# Patient Record
Sex: FEMALE | Race: WHITE | Employment: UNEMPLOYED | URBAN - METROPOLITAN AREA
[De-identification: names, ages, dates, MRNs, and addresses within clinical notes are randomized per-mention and may not be internally consistent; named-entity substitution may affect disease eponyms.]

---

## 2017-01-29 PROBLEM — I25.10 CAD (CORONARY ARTERY DISEASE): Status: ACTIVE | Noted: 2017-01-29

## 2017-01-29 PROBLEM — I48.91 NEW ONSET A-FIB (HCC): Status: ACTIVE | Noted: 2017-01-29

## 2017-01-29 PROBLEM — I10 HTN (HYPERTENSION): Status: ACTIVE | Noted: 2017-01-29

## 2017-01-29 PROBLEM — E78.5 HYPERLIPIDEMIA: Status: ACTIVE | Noted: 2017-01-29

## 2017-01-30 PROBLEM — I25.10 CAD (CORONARY ARTERY DISEASE): Status: RESOLVED | Noted: 2017-01-29 | Resolved: 2017-01-30

## 2019-12-22 ENCOUNTER — APPOINTMENT (OUTPATIENT)
Dept: GENERAL RADIOLOGY | Age: 69
End: 2019-12-22
Payer: MEDICARE

## 2019-12-22 ENCOUNTER — HOSPITAL ENCOUNTER (EMERGENCY)
Age: 69
Discharge: HOME | End: 2019-12-22
Payer: MEDICARE

## 2019-12-22 VITALS
TEMPERATURE: 97.6 F | BODY MASS INDEX: 44.56 KG/M2 | DIASTOLIC BLOOD PRESSURE: 109 MMHG | OXYGEN SATURATION: 100 % | HEIGHT: 64 IN | RESPIRATION RATE: 18 BRPM | WEIGHT: 261.02 LBS | HEART RATE: 55 BPM | SYSTOLIC BLOOD PRESSURE: 134 MMHG

## 2019-12-22 DIAGNOSIS — J20.9 ACUTE BRONCHITIS, UNSPECIFIED ORGANISM: Primary | ICD-10-CM

## 2019-12-22 LAB
ANION GAP SERPL CALCULATED.3IONS-SCNC: 13 MMOL/L (ref 3–16)
BACTERIA: ABNORMAL /HPF
BASOPHILS ABSOLUTE: 0 K/UL (ref 0–0.2)
BASOPHILS RELATIVE PERCENT: 0.5 %
BILIRUBIN URINE: ABNORMAL
BLOOD, URINE: NEGATIVE
BUN BLDV-MCNC: 16 MG/DL (ref 7–20)
CALCIUM SERPL-MCNC: 9.5 MG/DL (ref 8.3–10.6)
CHLORIDE BLD-SCNC: 104 MMOL/L (ref 99–110)
CLARITY: ABNORMAL
CO2: 24 MMOL/L (ref 21–32)
COLOR: ABNORMAL
CREAT SERPL-MCNC: 0.5 MG/DL (ref 0.6–1.2)
CRYSTALS, UA: ABNORMAL /HPF
EOSINOPHILS ABSOLUTE: 0.2 K/UL (ref 0–0.6)
EOSINOPHILS RELATIVE PERCENT: 2.1 %
EPITHELIAL CELLS, UA: 1 /HPF (ref 0–5)
GFR AFRICAN AMERICAN: >60
GFR NON-AFRICAN AMERICAN: >60
GLUCOSE BLD-MCNC: 89 MG/DL (ref 70–99)
GLUCOSE URINE: NEGATIVE MG/DL
HCT VFR BLD CALC: 42.5 % (ref 36–48)
HEMOGLOBIN: 13.9 G/DL (ref 12–16)
HYALINE CASTS: 1 /LPF (ref 0–8)
KETONES, URINE: NEGATIVE MG/DL
LEUKOCYTE ESTERASE, URINE: NEGATIVE
LYMPHOCYTES ABSOLUTE: 2.4 K/UL (ref 1–5.1)
LYMPHOCYTES RELATIVE PERCENT: 31.5 %
MCH RBC QN AUTO: 29.1 PG (ref 26–34)
MCHC RBC AUTO-ENTMCNC: 32.7 G/DL (ref 31–36)
MCV RBC AUTO: 89 FL (ref 80–100)
MICROSCOPIC EXAMINATION: YES
MONOCYTES ABSOLUTE: 0.9 K/UL (ref 0–1.3)
MONOCYTES RELATIVE PERCENT: 11.8 %
MUCUS: ABNORMAL /LPF
NEUTROPHILS ABSOLUTE: 4.2 K/UL (ref 1.7–7.7)
NEUTROPHILS RELATIVE PERCENT: 54.1 %
NITRITE, URINE: NEGATIVE
PDW BLD-RTO: 14.7 % (ref 12.4–15.4)
PH UA: 6 (ref 5–8)
PLATELET # BLD: 277 K/UL (ref 135–450)
PMV BLD AUTO: 8.1 FL (ref 5–10.5)
POTASSIUM SERPL-SCNC: 4.3 MMOL/L (ref 3.5–5.1)
PRO-BNP: 795 PG/ML (ref 0–124)
PROTEIN UA: NEGATIVE MG/DL
RBC # BLD: 4.78 M/UL (ref 4–5.2)
REASON FOR REJECTION: NORMAL
REJECTED TEST: NORMAL
SODIUM BLD-SCNC: 141 MMOL/L (ref 136–145)
SPECIFIC GRAVITY UA: 1.03 (ref 1–1.03)
TROPONIN: 0.01 NG/ML
TROPONIN: 0.01 NG/ML
URINE REFLEX TO CULTURE: ABNORMAL
URINE TYPE: ABNORMAL
UROBILINOGEN, URINE: 1 E.U./DL
WBC # BLD: 7.7 K/UL (ref 4–11)
WBC UA: 1 /HPF (ref 0–5)

## 2019-12-22 PROCEDURE — 81001 URINALYSIS AUTO W/SCOPE: CPT

## 2019-12-22 PROCEDURE — 94761 N-INVAS EAR/PLS OXIMETRY MLT: CPT

## 2019-12-22 PROCEDURE — 80048 BASIC METABOLIC PNL TOTAL CA: CPT

## 2019-12-22 PROCEDURE — 99284 EMERGENCY DEPT VISIT MOD MDM: CPT

## 2019-12-22 PROCEDURE — 83880 ASSAY OF NATRIURETIC PEPTIDE: CPT

## 2019-12-22 PROCEDURE — 6370000000 HC RX 637 (ALT 250 FOR IP): Performed by: PHYSICIAN ASSISTANT

## 2019-12-22 PROCEDURE — 87186 SC STD MICRODIL/AGAR DIL: CPT

## 2019-12-22 PROCEDURE — 85025 COMPLETE CBC W/AUTO DIFF WBC: CPT

## 2019-12-22 PROCEDURE — 94640 AIRWAY INHALATION TREATMENT: CPT

## 2019-12-22 PROCEDURE — 87077 CULTURE AEROBIC IDENTIFY: CPT

## 2019-12-22 PROCEDURE — 71046 X-RAY EXAM CHEST 2 VIEWS: CPT

## 2019-12-22 PROCEDURE — 87086 URINE CULTURE/COLONY COUNT: CPT

## 2019-12-22 PROCEDURE — 93005 ELECTROCARDIOGRAM TRACING: CPT | Performed by: PHYSICIAN ASSISTANT

## 2019-12-22 PROCEDURE — 84484 ASSAY OF TROPONIN QUANT: CPT

## 2019-12-22 RX ORDER — ONDANSETRON 4 MG/1
4 TABLET, ORALLY DISINTEGRATING ORAL EVERY 8 HOURS PRN
Qty: 10 TABLET | Refills: 0 | Status: SHIPPED | OUTPATIENT
Start: 2019-12-22 | End: 2019-12-22 | Stop reason: CLARIF

## 2019-12-22 RX ORDER — METHYLPREDNISOLONE 4 MG/1
4 TABLET ORAL SEE ADMIN INSTRUCTIONS
Qty: 1 KIT | Refills: 0 | Status: SHIPPED | OUTPATIENT
Start: 2019-12-22 | End: 2019-12-28

## 2019-12-22 RX ORDER — IPRATROPIUM BROMIDE AND ALBUTEROL SULFATE 2.5; .5 MG/3ML; MG/3ML
1 SOLUTION RESPIRATORY (INHALATION) ONCE
Status: COMPLETED | OUTPATIENT
Start: 2019-12-22 | End: 2019-12-22

## 2019-12-22 RX ORDER — OXYCODONE HYDROCHLORIDE AND ACETAMINOPHEN 5; 325 MG/1; MG/1
1 TABLET ORAL EVERY 6 HOURS PRN
Qty: 12 TABLET | Refills: 0 | Status: SHIPPED | OUTPATIENT
Start: 2019-12-22 | End: 2019-12-22 | Stop reason: CLARIF

## 2019-12-22 RX ORDER — OXYCODONE HYDROCHLORIDE 5 MG/1
5 TABLET ORAL EVERY 6 HOURS PRN
Qty: 12 TABLET | Refills: 0 | Status: SHIPPED | OUTPATIENT
Start: 2019-12-22 | End: 2019-12-22 | Stop reason: CLARIF

## 2019-12-22 RX ORDER — ALBUTEROL SULFATE 90 UG/1
2 AEROSOL, METERED RESPIRATORY (INHALATION) 4 TIMES DAILY
Qty: 1 INHALER | Refills: 0 | Status: ON HOLD | OUTPATIENT
Start: 2019-12-22 | End: 2022-07-23

## 2019-12-22 RX ADMIN — IPRATROPIUM BROMIDE AND ALBUTEROL SULFATE 1 AMPULE: .5; 3 SOLUTION RESPIRATORY (INHALATION) at 09:41

## 2019-12-22 ASSESSMENT — ENCOUNTER SYMPTOMS
SORE THROAT: 0
ABDOMINAL PAIN: 0
BACK PAIN: 0
COUGH: 1
SHORTNESS OF BREATH: 1
NAUSEA: 0
VOMITING: 0

## 2019-12-23 LAB
EKG ATRIAL RATE: 147 BPM
EKG DIAGNOSIS: NORMAL
EKG Q-T INTERVAL: 352 MS
EKG QRS DURATION: 126 MS
EKG QTC CALCULATION (BAZETT): 469 MS
EKG R AXIS: -31 DEGREES
EKG T AXIS: 72 DEGREES
EKG VENTRICULAR RATE: 107 BPM

## 2019-12-23 PROCEDURE — 93010 ELECTROCARDIOGRAM REPORT: CPT | Performed by: INTERNAL MEDICINE

## 2019-12-24 LAB
ORGANISM: ABNORMAL
URINE CULTURE, ROUTINE: ABNORMAL

## 2022-06-15 ENCOUNTER — APPOINTMENT (OUTPATIENT)
Dept: CT IMAGING | Age: 72
End: 2022-06-15
Payer: MEDICARE

## 2022-06-15 ENCOUNTER — HOSPITAL ENCOUNTER (EMERGENCY)
Age: 72
Discharge: HOME | End: 2022-06-15
Attending: EMERGENCY MEDICINE
Payer: MEDICARE

## 2022-06-15 VITALS
OXYGEN SATURATION: 96 % | HEART RATE: 80 BPM | TEMPERATURE: 98.9 F | WEIGHT: 292.99 LBS | HEIGHT: 67 IN | RESPIRATION RATE: 20 BRPM | BODY MASS INDEX: 45.99 KG/M2 | SYSTOLIC BLOOD PRESSURE: 133 MMHG | DIASTOLIC BLOOD PRESSURE: 91 MMHG

## 2022-06-15 DIAGNOSIS — U07.1 ACUTE COVID-19: Primary | ICD-10-CM

## 2022-06-15 DIAGNOSIS — R11.2 NAUSEA VOMITING AND DIARRHEA: ICD-10-CM

## 2022-06-15 DIAGNOSIS — R19.7 NAUSEA VOMITING AND DIARRHEA: ICD-10-CM

## 2022-06-15 LAB
A/G RATIO: 1.1 (ref 1.1–2.2)
ALBUMIN SERPL-MCNC: 3.8 G/DL (ref 3.4–5)
ALP BLD-CCNC: 115 U/L (ref 40–129)
ALT SERPL-CCNC: 15 U/L (ref 10–40)
ANION GAP SERPL CALCULATED.3IONS-SCNC: 13 MMOL/L (ref 3–16)
AST SERPL-CCNC: 28 U/L (ref 15–37)
ATYPICAL LYMPHOCYTE RELATIVE PERCENT: 4 % (ref 0–6)
BACTERIA: ABNORMAL /HPF
BASOPHILS ABSOLUTE: 0 K/UL (ref 0–0.2)
BASOPHILS RELATIVE PERCENT: 0 %
BILIRUB SERPL-MCNC: 1.6 MG/DL (ref 0–1)
BILIRUBIN URINE: NEGATIVE
BLOOD, URINE: ABNORMAL
BUN BLDV-MCNC: 14 MG/DL (ref 7–20)
CALCIUM SERPL-MCNC: 9.1 MG/DL (ref 8.3–10.6)
CHLORIDE BLD-SCNC: 98 MMOL/L (ref 99–110)
CLARITY: ABNORMAL
CO2: 22 MMOL/L (ref 21–32)
COLOR: YELLOW
CREAT SERPL-MCNC: 0.7 MG/DL (ref 0.6–1.2)
EOSINOPHILS ABSOLUTE: 0 K/UL (ref 0–0.6)
EOSINOPHILS RELATIVE PERCENT: 0 %
EPITHELIAL CELLS, UA: ABNORMAL /HPF (ref 0–5)
GFR AFRICAN AMERICAN: >60
GFR NON-AFRICAN AMERICAN: >60
GLUCOSE BLD-MCNC: 105 MG/DL (ref 70–99)
GLUCOSE URINE: NEGATIVE MG/DL
HCT VFR BLD CALC: 38.2 % (ref 36–48)
HEMOGLOBIN: 13 G/DL (ref 12–16)
KETONES, URINE: NEGATIVE MG/DL
LEUKOCYTE ESTERASE, URINE: ABNORMAL
LIPASE: 8 U/L (ref 13–60)
LYMPHOCYTES ABSOLUTE: 1.3 K/UL (ref 1–5.1)
LYMPHOCYTES RELATIVE PERCENT: 15 %
MCH RBC QN AUTO: 29.2 PG (ref 26–34)
MCHC RBC AUTO-ENTMCNC: 33.9 G/DL (ref 31–36)
MCV RBC AUTO: 86.1 FL (ref 80–100)
MICROSCOPIC EXAMINATION: YES
MONOCYTES ABSOLUTE: 0.7 K/UL (ref 0–1.3)
MONOCYTES RELATIVE PERCENT: 10 %
NEUTROPHILS ABSOLUTE: 4.7 K/UL (ref 1.7–7.7)
NEUTROPHILS RELATIVE PERCENT: 71 %
NITRITE, URINE: NEGATIVE
PDW BLD-RTO: 15.4 % (ref 12.4–15.4)
PH UA: 7 (ref 5–8)
PLATELET # BLD: 174 K/UL (ref 135–450)
PLATELET SLIDE REVIEW: ADEQUATE
PMV BLD AUTO: 8.7 FL (ref 5–10.5)
POTASSIUM REFLEX MAGNESIUM: 4.4 MMOL/L (ref 3.5–5.1)
PROTEIN UA: NEGATIVE MG/DL
RAPID INFLUENZA  B AGN: NEGATIVE
RAPID INFLUENZA A AGN: NEGATIVE
RBC # BLD: 4.44 M/UL (ref 4–5.2)
RBC UA: ABNORMAL /HPF (ref 0–4)
SARS-COV-2, NAAT: DETECTED
SLIDE REVIEW: NORMAL
SODIUM BLD-SCNC: 133 MMOL/L (ref 136–145)
SPECIFIC GRAVITY UA: 1.01 (ref 1–1.03)
TOTAL PROTEIN: 7.2 G/DL (ref 6.4–8.2)
URINE REFLEX TO CULTURE: ABNORMAL
URINE TYPE: ABNORMAL
UROBILINOGEN, URINE: 0.2 E.U./DL
WBC # BLD: 6.6 K/UL (ref 4–11)
WBC UA: ABNORMAL /HPF (ref 0–5)

## 2022-06-15 PROCEDURE — 87804 INFLUENZA ASSAY W/OPTIC: CPT

## 2022-06-15 PROCEDURE — 87635 SARS-COV-2 COVID-19 AMP PRB: CPT

## 2022-06-15 PROCEDURE — 80053 COMPREHEN METABOLIC PANEL: CPT

## 2022-06-15 PROCEDURE — 81001 URINALYSIS AUTO W/SCOPE: CPT

## 2022-06-15 PROCEDURE — 85025 COMPLETE CBC W/AUTO DIFF WBC: CPT

## 2022-06-15 PROCEDURE — 36415 COLL VENOUS BLD VENIPUNCTURE: CPT

## 2022-06-15 PROCEDURE — 2580000003 HC RX 258: Performed by: EMERGENCY MEDICINE

## 2022-06-15 PROCEDURE — 6360000002 HC RX W HCPCS: Performed by: EMERGENCY MEDICINE

## 2022-06-15 PROCEDURE — 83690 ASSAY OF LIPASE: CPT

## 2022-06-15 PROCEDURE — 74177 CT ABD & PELVIS W/CONTRAST: CPT

## 2022-06-15 PROCEDURE — 6360000004 HC RX CONTRAST MEDICATION: Performed by: EMERGENCY MEDICINE

## 2022-06-15 PROCEDURE — 99285 EMERGENCY DEPT VISIT HI MDM: CPT

## 2022-06-15 PROCEDURE — 96374 THER/PROPH/DIAG INJ IV PUSH: CPT

## 2022-06-15 RX ORDER — ONDANSETRON 2 MG/ML
4 INJECTION INTRAMUSCULAR; INTRAVENOUS ONCE
Status: COMPLETED | OUTPATIENT
Start: 2022-06-15 | End: 2022-06-15

## 2022-06-15 RX ORDER — 0.9 % SODIUM CHLORIDE 0.9 %
1000 INTRAVENOUS SOLUTION INTRAVENOUS ONCE
Status: COMPLETED | OUTPATIENT
Start: 2022-06-15 | End: 2022-06-15

## 2022-06-15 RX ORDER — ONDANSETRON 4 MG/1
4 TABLET, ORALLY DISINTEGRATING ORAL EVERY 8 HOURS PRN
Qty: 20 TABLET | Refills: 0 | Status: SHIPPED | OUTPATIENT
Start: 2022-06-15

## 2022-06-15 RX ADMIN — ONDANSETRON 4 MG: 2 INJECTION INTRAMUSCULAR; INTRAVENOUS at 13:58

## 2022-06-15 RX ADMIN — IOPAMIDOL 100 ML: 755 INJECTION, SOLUTION INTRAVENOUS at 14:52

## 2022-06-15 RX ADMIN — SODIUM CHLORIDE 1000 ML: 9 INJECTION, SOLUTION INTRAVENOUS at 13:58

## 2022-06-15 ASSESSMENT — PAIN DESCRIPTION - DESCRIPTORS: DESCRIPTORS: SHARP

## 2022-06-15 ASSESSMENT — PAIN - FUNCTIONAL ASSESSMENT
PAIN_FUNCTIONAL_ASSESSMENT: 0-10
PAIN_FUNCTIONAL_ASSESSMENT: NONE - DENIES PAIN

## 2022-06-15 ASSESSMENT — PAIN DESCRIPTION - LOCATION: LOCATION: ABDOMEN

## 2022-06-15 ASSESSMENT — PAIN SCALES - GENERAL: PAINLEVEL_OUTOF10: 10

## 2022-06-15 NOTE — ED NOTES
D/C: Order noted for d/c. Pt confirmed d/c paperwork and one prescription has correct name. Discharge and education instructions reviewed with patient. Teach-back successful. Pt verbalized understanding and signed d/c papers. Pt denied questions at this time. No acute distress noted. Patient instructed to follow-up as noted - return to emergency department if symptoms worsen. Patient verbalized understanding. Discharged per EDMD with discharge instructions. Pt discharged to private vehicle. Patient stable upon departure. Thanked patient for choosing [facility] for care. Provider aware of patient pain at time of discharge.        Everitt Angelucci, RN  06/15/22 5377

## 2022-06-15 NOTE — ED PROVIDER NOTES
[id number] [facility]    CHIEF COMPLAINT  Emesis (since friday, states that water has been staying down), Diarrhea, Other (states that she had blood on her pad the other day, but unsure if it was from her urethra or vagina), and Hypertension (did not take her BP meds for the last three days)       HISTORY OF PRESENT ILLNESS  Carole Heck is a 73 y.o. female who presents to the ED with complaint of abdominal pain, nausea, vomiting, diarrhea. Symptoms started late last week. Subjective fever but hasn't measured a temperature. Denies chest pain but complains of SOB. Saw some blood on the toilet paper, isn't sure if it's from her urine or otherwise. Denies dysuria, vaginal discharge. Has felt so unwell that she hasn't been able to take her medications, including her medications for several days. These include antihypertensives, as well as Xarelto for history of a-fib. Denies new visual disturbance or headache. Diffuse mild abdominal pain, poorly characterized. No other complaints, modifying factors or associated symptoms.      I have reviewed the following from the nursing documentation:    Past Medical History:   Diagnosis Date    Arthritis     Atrial fibrillation ([facility] [address]     Depression     Hyperlipidemia     Hypertension      Past Surgical History:   Procedure Laterality Date    CARDIAC CATHETERIZATION       SECTION      HERNIA REPAIR       Family History   Problem Relation Age of Onset    Diabetes Mother     High Cholesterol Father     High Blood Pressure Father     Diabetes Maternal Uncle     Diabetes Maternal Grandmother      Social History     Socioeconomic History    Marital status: Single     Spouse name: Not on file    Number of children: Not on file    Years of education: Not on file    Highest education level: Not on file   Occupational History    Not on file   Tobacco Use    Smoking status: Never Smoker    Smokeless tobacco: Never Used   Substance and Sexual Activity    Alcohol use: No    Drug use: No    Sexual activity: Not on file   Other Topics Concern    Not on file   Social History Narrative    Not on file     Social Determinants of Health     Financial Resource Strain:     Difficulty of Paying Living Expenses: Not on file   Food Insecurity:     Worried About Running Out of Food in the Last Year: Not on file    Stephen of Food in the Last Year: Not on file   Transportation Needs:     Lack of Transportation (Medical): Not on file    Lack of Transportation (Non-Medical): Not on file   Physical Activity:     Days of Exercise per Week: Not on file    Minutes of Exercise per Session: Not on file   Stress:     Feeling of Stress : Not on file   Social Connections:     Frequency of Communication with Friends and Family: Not on file    Frequency of Social Gatherings with Friends and Family: Not on file    Attends Jainism Services: Not on file    Active Member of [address] [place] [address] or Organizations: Not on file    Attends Club or Organization Meetings: Not on file    Marital Status: Not on file   Intimate Partner Violence:     Fear of Current or Ex-Partner: Not on file    Emotionally Abused: Not on file    Physically Abused: Not on file    Sexually Abused: Not on file   Housing Stability:     Unable to Pay for Housing in the Last Year: Not on file    Number of [place] in the Last Year: Not on file    Unstable Housing in the Last Year: Not on file     No current facility-administered medications for this encounter.      Current Outpatient Medications   Medication Sig Dispense Refill    ondansetron (ZOFRAN ODT) 4 MG disintegrating tablet Take 1 tablet by mouth every 8 hours as needed for Nausea 20 tablet 0    albuterol sulfate HFA (VENTOLIN HFA) 108 (90 Base) MCG/ACT inhaler Inhale 2 puffs into the lungs 4 times daily for 5 days 1 Inhaler 0    rivaroxaban (XARELTO) 20 MG TABS tablet Take 1 tablet by mouth daily 30 tablet 3    metoprolol succinate (TOPROL XL) 50 MG extended release tablet Take 1 tablet by mouth daily 30 tablet 3    furosemide (LASIX) 20 MG tablet Take 1 tablet by mouth daily 60 tablet 3    HYDROcodone-acetaminophen (NORCO) 7.5-325 MG per tablet One pill every six hours as needed      simvastatin (ZOCOR) 20 MG tablet ONE PILL AT BEDTIME      lisinopril (PRINIVIL;ZESTRIL) 5 MG tablet One pill every day      tiZANidine (ZANAFLEX) 4 MG tablet One pill every eight hours as needed       Allergies   Allergen Reactions    Pcn [Penicillins]        REVIEW OF SYSTEMS  10 systems reviewed, pertinent positives and negatives per HPI, otherwise noted to be negative. PHYSICAL EXAM  ED Triage Vitals [06/15/22 1301]   BP Temp Temp Source Heart Rate Resp SpO2 Height Weight   (!) 219/198 98.9 °F (37.2 °C) Oral (!) 120 20 95 % 5' 7\" (1.702 m) 292 lb 15.9 oz (132.9 kg)     General appearance: Awake and alert. Cooperative. No acute distress. HENT: Normocephalic. Atraumatic. Mucous membranes are moist.  Neck: Supple. Eyes: PERRL. EOMI. Heart/Chest: RRR. No murmurs. Lungs: Respirations unlabored. CTAB. Good air exchange. Speaking comfortably in full sentences. Abdomen: Soft. Diffuse mild abdominal pain. Non-distended. No rebound or guarding. Musculoskeletal: No extremity edema. No deformity. No tenderness in the extremities. All extremities neurovascularly intact. Skin: Warm and dry. No acute rashes. Neurological: Alert and oriented. CN II-XII intact. Strength 5/5 bilateral upper and lower extremities. Sensation intact to light touch. Gait normal.  Psychiatric: Mood/affect: normal      LABS  I have reviewed all labs for this visit.    Results for orders placed or performed during the hospital encounter of 06/15/22   COVID-19, Rapid    Specimen: Nasopharyngeal Swab   Result Value Ref Range    SARS-CoV-2, NAAT DETECTED (AA) Not Detected   Rapid influenza A/B antigens    Specimen: Nasopharyngeal   Result Value Ref Range    Rapid Influenza A Ag Negative Negative    Rapid Influenza B Ag Negative Negative   CBC with Auto Differential   Result Value Ref Range    WBC 6.6 4.0 - 11.0 K/uL    RBC 4.44 4.00 - 5.20 M/uL    Hemoglobin 13.0 12.0 - 16.0 g/dL    Hematocrit 38.2 36.0 - 48.0 %    MCV 86.1 80.0 - 100.0 fL    MCH 29.2 26.0 - 34.0 pg    MCHC 33.9 31.0 - 36.0 g/dL    RDW 15.4 12.4 - 15.4 %    Platelets 265 150 - 918 K/uL    MPV 8.7 5.0 - 10.5 fL    PLATELET SLIDE REVIEW Adequate     SLIDE REVIEW see below     Neutrophils % 71.0 %    Lymphocytes % 15.0 %    Monocytes % 10.0 %    Eosinophils % 0.0 %    Basophils % 0.0 %    Neutrophils Absolute 4.7 1.7 - 7.7 K/uL    Lymphocytes Absolute 1.3 1.0 - 5.1 K/uL    Monocytes Absolute 0.7 0.0 - 1.3 K/uL    Eosinophils Absolute 0.0 0.0 - 0.6 K/uL    Basophils Absolute 0.0 0.0 - 0.2 K/uL    Atypical Lymphocytes Relative 4 0 - 6 %   Comprehensive Metabolic Panel w/ Reflex to MG   Result Value Ref Range    Sodium 133 (L) 136 - 145 mmol/L    Potassium reflex Magnesium 4.4 3.5 - 5.1 mmol/L    Chloride 98 (L) 99 - 110 mmol/L    CO2 22 21 - 32 mmol/L    Anion Gap 13 3 - 16    Glucose 105 (H) 70 - 99 mg/dL    BUN 14 7 - 20 mg/dL    CREATININE 0.7 0.6 - 1.2 mg/dL    GFR Non-African American >60 >60    GFR African American >60 >60    Calcium 9.1 8.3 - 10.6 mg/dL    Total Protein 7.2 6.4 - 8.2 g/dL    Albumin 3.8 3.4 - 5.0 g/dL    Albumin/Globulin Ratio 1.1 1.1 - 2.2    Total Bilirubin 1.6 (H) 0.0 - 1.0 mg/dL    Alkaline Phosphatase 115 40 - 129 U/L    ALT 15 10 - 40 U/L    AST 28 15 - 37 U/L   Urinalysis with Reflex to Culture    Specimen: Urine, clean catch   Result Value Ref Range    Color, UA Yellow Straw/Yellow    Clarity, UA CLOUDY (A) Clear    Glucose, Ur Negative Negative mg/dL    Bilirubin Urine Negative Negative    Ketones, Urine Negative Negative mg/dL    Specific Gravity, UA 1.015 1.005 - 1.030    Blood, Urine LARGE (A) Negative    pH, UA 7.0 5.0 - 8.0    Protein, UA Negative Negative mg/dL    Urobilinogen, Urine 0.2 <2.0 E.U./dL    Nitrite, Urine Negative Negative    Leukocyte Esterase, Urine TRACE (A) Negative    Microscopic Examination YES     Urine Type Voided     Urine Reflex to Culture Not Indicated    Lipase   Result Value Ref Range    Lipase 8.0 (L) 13.0 - 60.0 U/L   Microscopic Urinalysis   Result Value Ref Range    WBC, UA 3-5 0 - 5 /HPF    RBC, UA None seen 0 - 4 /HPF    Epithelial Cells, UA 11-20 (A) 0 - 5 /HPF    Bacteria, UA 1+ (A) None Seen /HPF       RADIOLOGY  I have reviewed all radiographic studies for this visit. CT ABDOMEN PELVIS W IV CONTRAST Additional Contrast? None   Preliminary Result   1. No acute findings within the abdomen or pelvis. No acute bowel pathology. 2. Cholelithiasis. 3. Mild hepatic steatosis. 4. Stable 1.6 cm right adrenal nodule, likely an adenoma. ED COURSE/MDM  Patient seen and evaluated. Old records reviewed. Labs and imaging reviewed and results discussed with patient/family to extent possible. This is a 72-year-old female who presents with complaint of nausea, vomiting, diarrhea and abdominal pain. On arrival the patient is hypertensive in the setting of not being able to tolerate her antihypertensives and given her discomfort. Abdomen is with diffuse mild tenderness without rebound or guarding. CBC no leukocytosis or anemia. Renal panel mild hyponatremia and hypochloremia, preserved renal function. LFTs are generally unremarkable. Lipase not elevated. CT abdomen and pelvis nothing acute. Cholelithiasis but no cholecystitis. Stable 1.6 cm right adrenal nodule favored adenoma. Urinalysis generally unremarkable. Rapid COVID positive. This is likely the etiology of her symptoms. Patient was administered 1 L IV normal saline bolus and 4 mg of IV Zofran with improvement in her symptoms. Serial abdominal exams are reassuring. She reports feeling much better. Her blood pressure has improved without the administration of antihypertensives.   She is tolerating p.o. We will discharged home with instructions to quarantine per CDC guidelines. Will prescribe Zofran. Usual strict return precautions for new or worsening symptoms communicated    Is this patient to be included in the SEP-1 Core Measure? No   Exclusion criteria - the patient is NOT to be included for SEP-1 Core Measure due to:  Viral etiology found or highly suspected (including COVID-19) without concomitant bacterial infection    IVivek MD, am the primary clinician of record. During the patient's ED course, the patient was given:  Medications   0.9 % sodium chloride bolus (0 mLs IntraVENous Stopped 6/15/22 1547)   ondansetron (ZOFRAN) injection 4 mg (4 mg IntraVENous Given 6/15/22 1358)   iopamidol (ISOVUE-370) 76 % injection 100 mL (100 mLs IntraVENous Given 6/15/22 1452)        All questions were answered and the patient/family expressed understanding and agreement with the plan. PROCEDURES  None    CRITICAL CARE  N/A    CLINICAL IMPRESSION  1. Acute COVID-19    2. Nausea vomiting and diarrhea        DISPOSITION   discharge     Vivek Howell MD    Note: This chart was created using voice recognition dictation software. Efforts were made by me to ensure accuracy, however some errors may be present due to limitations of this technology and occasionally words are not transcribed correctly.         Vivek Howell MD  06/15/22 3173

## 2022-06-15 NOTE — ED TRIAGE NOTES
Patient presents to ED via private car with complaints of abdominal pain, vomiting, and diarrhea. Patient states that this has been going on since Friday. She has been able to drink some water. She also states that she noticed some blood on her pad in her underwear the other day, but she is unsure if it came from her vagina or urethra. Denies blood in her vomit or stool. History of afib, HLD, and HTN. BP is elevated today. Patient states that she has not taken her medication in the last three days. Her other VS are WNL. Patient is alert and oriented x4.

## 2022-06-15 NOTE — ED NOTES
Home health nurse at bedside, states she has been working with pt 3 years now. MELVINA Chacko notified of Pt's blood pressure as there is no Primary RN Assigned to pt , 219/198 on left upper arm, switched to left lower arm to double check, still read 219/183.  Pt states she did NOT take any of her daily medications one of them being blood pressure medicine     [facility]  06/15/22 1304

## 2022-07-22 ENCOUNTER — APPOINTMENT (OUTPATIENT)
Dept: GENERAL RADIOLOGY | Age: 72
DRG: 067 | End: 2022-07-22
Payer: MEDICARE

## 2022-07-22 ENCOUNTER — APPOINTMENT (OUTPATIENT)
Dept: CT IMAGING | Age: 72
DRG: 067 | End: 2022-07-22
Payer: MEDICARE

## 2022-07-22 ENCOUNTER — HOSPITAL ENCOUNTER (INPATIENT)
Age: 72
LOS: 7 days | Discharge: HOME HEALTH | DRG: 067 | End: 2022-07-29
Attending: EMERGENCY MEDICINE | Admitting: INTERNAL MEDICINE
Payer: MEDICARE

## 2022-07-22 DIAGNOSIS — R42 DIZZINESS: Primary | ICD-10-CM

## 2022-07-22 DIAGNOSIS — R26.2 UNABLE TO AMBULATE: ICD-10-CM

## 2022-07-22 PROBLEM — G45.9 TIA (TRANSIENT ISCHEMIC ATTACK): Status: ACTIVE | Noted: 2022-07-22

## 2022-07-22 LAB
A/G RATIO: 1.2 (ref 1.1–2.2)
ALBUMIN SERPL-MCNC: 3.7 G/DL (ref 3.4–5)
ALP BLD-CCNC: 123 U/L (ref 40–129)
ALT SERPL-CCNC: <5 U/L (ref 10–40)
ANION GAP SERPL CALCULATED.3IONS-SCNC: 14 MMOL/L (ref 3–16)
AST SERPL-CCNC: 14 U/L (ref 15–37)
BACTERIA: ABNORMAL /HPF
BASOPHILS ABSOLUTE: 0 K/UL (ref 0–0.2)
BASOPHILS RELATIVE PERCENT: 0.6 %
BILIRUB SERPL-MCNC: 1.6 MG/DL (ref 0–1)
BILIRUBIN URINE: NEGATIVE
BLOOD, URINE: ABNORMAL
BUN BLDV-MCNC: 16 MG/DL (ref 7–20)
CALCIUM SERPL-MCNC: 8.8 MG/DL (ref 8.3–10.6)
CHLORIDE BLD-SCNC: 102 MMOL/L (ref 99–110)
CLARITY: ABNORMAL
CO2: 20 MMOL/L (ref 21–32)
COLOR: YELLOW
CREAT SERPL-MCNC: 0.6 MG/DL (ref 0.6–1.2)
EOSINOPHILS ABSOLUTE: 0.2 K/UL (ref 0–0.6)
EOSINOPHILS RELATIVE PERCENT: 2.9 %
EPITHELIAL CELLS, UA: ABNORMAL /HPF (ref 0–5)
GFR AFRICAN AMERICAN: >60
GFR NON-AFRICAN AMERICAN: >60
GLUCOSE BLD-MCNC: 90 MG/DL (ref 70–99)
GLUCOSE BLD-MCNC: 99 MG/DL (ref 70–99)
GLUCOSE URINE: NEGATIVE MG/DL
HCT VFR BLD CALC: 40.4 % (ref 36–48)
HEMOGLOBIN: 13.3 G/DL (ref 12–16)
KETONES, URINE: NEGATIVE MG/DL
LEUKOCYTE ESTERASE, URINE: ABNORMAL
LYMPHOCYTES ABSOLUTE: 1.8 K/UL (ref 1–5.1)
LYMPHOCYTES RELATIVE PERCENT: 24.3 %
MCH RBC QN AUTO: 29.3 PG (ref 26–34)
MCHC RBC AUTO-ENTMCNC: 33 G/DL (ref 31–36)
MCV RBC AUTO: 88.9 FL (ref 80–100)
MICROSCOPIC EXAMINATION: YES
MONOCYTES ABSOLUTE: 0.6 K/UL (ref 0–1.3)
MONOCYTES RELATIVE PERCENT: 7.6 %
NEUTROPHILS ABSOLUTE: 4.7 K/UL (ref 1.7–7.7)
NEUTROPHILS RELATIVE PERCENT: 64.6 %
NITRITE, URINE: NEGATIVE
PDW BLD-RTO: 16.4 % (ref 12.4–15.4)
PERFORMED ON: NORMAL
PH UA: 7.5 (ref 5–8)
PLATELET # BLD: 207 K/UL (ref 135–450)
PMV BLD AUTO: 7.7 FL (ref 5–10.5)
POTASSIUM SERPL-SCNC: 4.5 MMOL/L (ref 3.5–5.1)
PROTEIN UA: NEGATIVE MG/DL
RBC # BLD: 4.55 M/UL (ref 4–5.2)
RBC UA: ABNORMAL /HPF (ref 0–4)
SARS-COV-2, NAAT: NOT DETECTED
SODIUM BLD-SCNC: 136 MMOL/L (ref 136–145)
SPECIFIC GRAVITY UA: 1.01 (ref 1–1.03)
TOTAL PROTEIN: 6.9 G/DL (ref 6.4–8.2)
TRICHOMONAS: ABNORMAL /HPF
TROPONIN: <0.01 NG/ML
URINE REFLEX TO CULTURE: ABNORMAL
URINE TYPE: ABNORMAL
UROBILINOGEN, URINE: 0.2 E.U./DL
WBC # BLD: 7.3 K/UL (ref 4–11)
WBC UA: ABNORMAL /HPF (ref 0–5)
YEAST: PRESENT /HPF

## 2022-07-22 PROCEDURE — 70450 CT HEAD/BRAIN W/O DYE: CPT

## 2022-07-22 PROCEDURE — 1200000000 HC SEMI PRIVATE

## 2022-07-22 PROCEDURE — 6370000000 HC RX 637 (ALT 250 FOR IP): Performed by: EMERGENCY MEDICINE

## 2022-07-22 PROCEDURE — 36415 COLL VENOUS BLD VENIPUNCTURE: CPT

## 2022-07-22 PROCEDURE — 71045 X-RAY EXAM CHEST 1 VIEW: CPT

## 2022-07-22 PROCEDURE — 85025 COMPLETE CBC W/AUTO DIFF WBC: CPT

## 2022-07-22 PROCEDURE — 93005 ELECTROCARDIOGRAM TRACING: CPT | Performed by: EMERGENCY MEDICINE

## 2022-07-22 PROCEDURE — 99285 EMERGENCY DEPT VISIT HI MDM: CPT

## 2022-07-22 PROCEDURE — 6370000000 HC RX 637 (ALT 250 FOR IP): Performed by: INTERNAL MEDICINE

## 2022-07-22 PROCEDURE — 81001 URINALYSIS AUTO W/SCOPE: CPT

## 2022-07-22 PROCEDURE — 70496 CT ANGIOGRAPHY HEAD: CPT

## 2022-07-22 PROCEDURE — 87635 SARS-COV-2 COVID-19 AMP PRB: CPT

## 2022-07-22 PROCEDURE — 80053 COMPREHEN METABOLIC PANEL: CPT

## 2022-07-22 PROCEDURE — 6360000004 HC RX CONTRAST MEDICATION: Performed by: EMERGENCY MEDICINE

## 2022-07-22 PROCEDURE — G0378 HOSPITAL OBSERVATION PER HR: HCPCS

## 2022-07-22 PROCEDURE — 84484 ASSAY OF TROPONIN QUANT: CPT

## 2022-07-22 RX ORDER — ONDANSETRON 2 MG/ML
4 INJECTION INTRAMUSCULAR; INTRAVENOUS EVERY 6 HOURS PRN
Status: DISCONTINUED | OUTPATIENT
Start: 2022-07-22 | End: 2022-07-29 | Stop reason: HOSPADM

## 2022-07-22 RX ORDER — FLUCONAZOLE 100 MG/1
150 TABLET ORAL ONCE
Status: COMPLETED | OUTPATIENT
Start: 2022-07-22 | End: 2022-07-22

## 2022-07-22 RX ORDER — ATORVASTATIN CALCIUM 80 MG/1
80 TABLET, FILM COATED ORAL NIGHTLY
Status: DISCONTINUED | OUTPATIENT
Start: 2022-07-22 | End: 2022-07-29 | Stop reason: HOSPADM

## 2022-07-22 RX ORDER — ASPIRIN 300 MG/1
300 SUPPOSITORY RECTAL DAILY
Status: DISCONTINUED | OUTPATIENT
Start: 2022-07-23 | End: 2022-07-29 | Stop reason: HOSPADM

## 2022-07-22 RX ORDER — ASPIRIN 81 MG/1
324 TABLET, CHEWABLE ORAL ONCE
Status: COMPLETED | OUTPATIENT
Start: 2022-07-22 | End: 2022-07-22

## 2022-07-22 RX ORDER — ALBUTEROL SULFATE 90 UG/1
2 AEROSOL, METERED RESPIRATORY (INHALATION) 4 TIMES DAILY
Status: DISCONTINUED | OUTPATIENT
Start: 2022-07-22 | End: 2022-07-23

## 2022-07-22 RX ORDER — MECLIZINE HCL 12.5 MG/1
25 TABLET ORAL ONCE
Status: COMPLETED | OUTPATIENT
Start: 2022-07-22 | End: 2022-07-22

## 2022-07-22 RX ORDER — ASPIRIN 81 MG/1
81 TABLET ORAL DAILY
Status: DISCONTINUED | OUTPATIENT
Start: 2022-07-23 | End: 2022-07-29 | Stop reason: HOSPADM

## 2022-07-22 RX ORDER — ONDANSETRON 4 MG/1
4 TABLET, ORALLY DISINTEGRATING ORAL EVERY 8 HOURS PRN
Status: DISCONTINUED | OUTPATIENT
Start: 2022-07-22 | End: 2022-07-29 | Stop reason: HOSPADM

## 2022-07-22 RX ORDER — POLYETHYLENE GLYCOL 3350 17 G/17G
17 POWDER, FOR SOLUTION ORAL DAILY PRN
Status: DISCONTINUED | OUTPATIENT
Start: 2022-07-22 | End: 2022-07-29 | Stop reason: HOSPADM

## 2022-07-22 RX ORDER — LABETALOL HYDROCHLORIDE 5 MG/ML
10 INJECTION, SOLUTION INTRAVENOUS EVERY 10 MIN PRN
Status: DISCONTINUED | OUTPATIENT
Start: 2022-07-22 | End: 2022-07-29 | Stop reason: HOSPADM

## 2022-07-22 RX ORDER — METOPROLOL SUCCINATE 50 MG/1
50 TABLET, EXTENDED RELEASE ORAL DAILY
Status: DISCONTINUED | OUTPATIENT
Start: 2022-07-23 | End: 2022-07-27

## 2022-07-22 RX ADMIN — IOPAMIDOL 100 ML: 755 INJECTION, SOLUTION INTRAVENOUS at 15:39

## 2022-07-22 RX ADMIN — ASPIRIN 324 MG: 81 TABLET, CHEWABLE ORAL at 16:45

## 2022-07-22 RX ADMIN — ATORVASTATIN CALCIUM 80 MG: 80 TABLET, FILM COATED ORAL at 23:29

## 2022-07-22 RX ADMIN — RIVAROXABAN 20 MG: 20 TABLET, FILM COATED ORAL at 23:29

## 2022-07-22 RX ADMIN — FLUCONAZOLE 150 MG: 100 TABLET ORAL at 18:02

## 2022-07-22 RX ADMIN — MECLIZINE 25 MG: 12.5 TABLET ORAL at 16:19

## 2022-07-22 ASSESSMENT — ENCOUNTER SYMPTOMS
FACIAL SWELLING: 0
NAUSEA: 0
TROUBLE SWALLOWING: 0
VOMITING: 0
STRIDOR: 0
SHORTNESS OF BREATH: 0
WHEEZING: 0
VOICE CHANGE: 0
COLOR CHANGE: 0
ABDOMINAL PAIN: 0

## 2022-07-22 ASSESSMENT — PAIN - FUNCTIONAL ASSESSMENT
PAIN_FUNCTIONAL_ASSESSMENT: NONE - DENIES PAIN
PAIN_FUNCTIONAL_ASSESSMENT: NONE - DENIES PAIN

## 2022-07-22 NOTE — ED NOTES
EMD to bedside. Pt up walking in room using her walker. Pt states she is feeling very dizzy while walking . EMD told pt that she needs to be admitted to hospital-pt agreeable and calling her family.      Kevin Hutson RN  07/22/22 9870

## 2022-07-22 NOTE — ED NOTES
Report given to Jeffery Darling RN @ [address] [place] [address],[address].        Governor ALYSSA Brothers  07/22/22 9284

## 2022-07-22 NOTE — ED PROVIDER NOTES
[address] [facility]  eMERGENCY dEPARTMENT eNCOUnter      Pt Name: Joel Kearney  MRN: [id number]  Michellegfcriselda 1950  Date of evaluation: 7/22/2022  Provider: Minnie Ledesma MD    CHIEF COMPLAINT     Dizziness    HISTORY OF PRESENT ILLNESS   (Location/Symptom, Timing/Onset, Context/Setting, Quality, Duration, Modifying Factors, Severity)  Note limiting factors. Joel Kearney is a 73 y.o. female with hx of atrial fibrillation anticoagulated with Xarelto who presents due to dizziness described as head spinning sensation and inability to ambulate without her walker for the past 2 days. Patient reports that normally she only needs to use her walker when she goes to the grocery store and other large outdoor spaces however normally she is able to walk around her house without the use of a walker without any problems. She reports for the past 2 days she has been unable to ambulate even endorsing small spaces without the use of her walker and frequently feels the need to grab onto things to stop from falling. She reports she feels like her head is spinning especially when she moves. She denies any ear pain. She denies any facial droop, numbness or weakness of her arms or legs, or focal neurologic deficit other than vertigo. Reports that her symptoms are moderate constant and wax and wane. Of note the patient did have COVID 1 month ago but has recovered. HPI    Nursing Notes were reviewed. REVIEW OFSYSTEMS    (2-9 systems for level 4, 10 or more for level 5)     Review of Systems   Constitutional:  Positive for fatigue. Negative for appetite change, fever and unexpected weight change. HENT:  Negative for facial swelling, trouble swallowing and voice change. Eyes:  Negative for visual disturbance. Respiratory:  Negative for shortness of breath, wheezing and stridor. Cardiovascular:  Negative for chest pain and palpitations.    Gastrointestinal:  Negative for abdominal pain, nausea and vomiting. Genitourinary:  Negative for dysuria and vaginal bleeding. Musculoskeletal:  Positive for gait problem. Negative for neck pain and neck stiffness. Skin:  Negative for color change and wound. Neurological:  Positive for dizziness. Negative for seizures and syncope. Psychiatric/Behavioral:  Negative for self-injury and suicidal ideas. Except as noted above the remainder of the review of systems was reviewed and negative.        PAST MEDICAL HISTORY     Past Medical History:   Diagnosis Date    Arthritis     Atrial fibrillation ([address]     Depression     Hyperlipidemia     Hypertension          SURGICAL HISTORY       Past Surgical History:   Procedure Laterality Date    CARDIAC CATHETERIZATION       SECTION      HERNIA REPAIR         CURRENT MEDICATIONS       Previous Medications    ALBUTEROL SULFATE HFA (VENTOLIN HFA) 108 (90 BASE) MCG/ACT INHALER    Inhale 2 puffs into the lungs 4 times daily for 5 days    DICLOFENAC SODIUM (VOLTAREN) 1 % GEL    Apply topically 4 times daily as needed for Pain    FUROSEMIDE (LASIX) 20 MG TABLET    Take 1 tablet by mouth daily    HYDROCODONE-ACETAMINOPHEN (NORCO) 7.5-325 MG PER TABLET    One pill every six hours as needed    LISINOPRIL (PRINIVIL;ZESTRIL) 5 MG TABLET    One pill every day    METOPROLOL SUCCINATE (TOPROL XL) 50 MG EXTENDED RELEASE TABLET    Take 1 tablet by mouth daily    ONDANSETRON (ZOFRAN ODT) 4 MG DISINTEGRATING TABLET    Take 1 tablet by mouth every 8 hours as needed for Nausea    RIVAROXABAN (XARELTO) 20 MG TABS TABLET    Take 1 tablet by mouth daily    SIMVASTATIN (ZOCOR) 20 MG TABLET    ONE PILL AT BEDTIME    TIZANIDINE (ZANAFLEX) 4 MG TABLET    One pill every eight hours as needed       ALLERGIES     Pcn [penicillins]    FAMILY HISTORY       Family History   Problem Relation Age of Onset    Diabetes Mother     High Cholesterol Father     High Blood Pressure Father     Diabetes Maternal Uncle     Diabetes Maternal Grandmother           SOCIAL HISTORY       Social History     Socioeconomic History    Marital status: Single     Spouse name: None    Number of children: None    Years of education: None    Highest education level: None   Tobacco Use    Smoking status: Never    Smokeless tobacco: Never   Substance and Sexual Activity    Alcohol use: No    Drug use: No         PHYSICAL EXAM    (up to 7 for level 4, 8 or more for level 5)     Vitals:    07/22/22 1405 07/22/22 1604   BP: (!) 153/109 (!) 156/79   Pulse: 82 89   Resp: 20 24   Temp: 98.5 °F (36.9 °C)    TempSrc: Oral    SpO2: 97% 96%       Physical Exam  Vitals and nursing note reviewed. Constitutional:       Appearance: She is well-developed. She is not diaphoretic. HENT:      Head: Normocephalic and atraumatic. Right Ear: External ear normal.      Left Ear: External ear normal.   Eyes:      Conjunctiva/sclera: Conjunctivae normal.   Neck:      Vascular: No JVD. Trachea: No tracheal deviation. Cardiovascular:      Rate and Rhythm: Normal rate. Pulmonary:      Effort: Pulmonary effort is normal. No respiratory distress. Breath sounds: Normal breath sounds. No wheezing. Abdominal:      General: There is no distension. Palpations: Abdomen is soft. Tenderness: There is no abdominal tenderness. There is no guarding or rebound. Musculoskeletal:         General: No tenderness or deformity. Normal range of motion. Cervical back: Neck supple. Skin:     General: Skin is warm and dry. Neurological:      Mental Status: She is alert. Comments: Patient awake and alert. No facial droop. Sensation intact in all CNV distributions of the face bilaterally. Sensation intact in all 4 extremities equal bilaterally. Patient with 4+ out of 5 strength equal in all 4 extremities.   Normal finger-to-nose and no truncal ataxia when sitting up however when patient stands up and begins to ambulate she immediately needs extra support and is unable to ambulate on her own power due to vertigo. DIAGNOSTIC RESULTS     EKG:All EKG's are interpreted by the Emergency Department Physician who either signs or Co-signs this chart in the absence of a cardiologist.    The Ekg interpreted by me shows  atrial fibrillation with a rate of 83  Axis is   Left axis deviation  QTc is   481ms  Intervals and Durations are unremarkable. ST Segments: no acute change  No significant change from prior EKG dated 12/22/19      RADIOLOGY:     Interpretation per the Radiologist below, if available at the time of this note:    CTA HEAD NECK W CONTRAST   Final Result   No CT evidence of an acute infarct. No flow limiting stenosis or large vessel occlusion detected within the head   or neck. CT HEAD WO CONTRAST   Final Result   No CT evidence of an acute infarct. No flow limiting stenosis or large vessel occlusion detected within the head   or neck. XR CHEST PORTABLE   Final Result   No acute process.       Stable cardiomegaly             LABS:  Labs Reviewed   CBC WITH AUTO DIFFERENTIAL - Abnormal; Notable for the following components:       Result Value    RDW 16.4 (*)     All other components within normal limits   COMPREHENSIVE METABOLIC PANEL - Abnormal; Notable for the following components:    CO2 20 (*)     Total Bilirubin 1.6 (*)     ALT <5 (*)     AST 14 (*)     All other components within normal limits   URINALYSIS WITH REFLEX TO CULTURE - Abnormal; Notable for the following components:    Clarity, UA SL CLOUDY (*)     Blood, Urine SMALL (*)     Leukocyte Esterase, Urine SMALL (*)     All other components within normal limits   MICROSCOPIC URINALYSIS - Abnormal; Notable for the following components:    Epithelial Cells, UA 6-10 (*)     Bacteria, UA 2+ (*)     Yeast, UA Present (*)     All other components within normal limits   COVID-19, RAPID   TROPONIN   POCT GLUCOSE       All otherlabs were within normal range or not returned as of this dictation. EMERGENCY DEPARTMENT COURSE and DIFFERENTIAL DIAGNOSIS/MDM:   Vitals:    Vitals:    07/22/22 1405 07/22/22 1604   BP: (!) 153/109 (!) 156/79   Pulse: 82 89   Resp: 20 24   Temp: 98.5 °F (36.9 °C)    TempSrc: Oral    SpO2: 97% 96%         Is this patient to be included in the SEP-1 Core Measure due to severe sepsis or septic shock? No   Exclusion criteria - the patient is NOT to be included for SEP-1 Core Measure due to:  2+ SIRS criteria are not met      MDM  Broad differential diagnosis at this time including cerebellar CVA, peripheral vertigo, dysrhythmia, electrolyte imbalance, pathology. Laboratory evaluation does not show any emergent pathology. Patient is not a code stroke as symptoms have been ongoing for 2 days however stroke imaging is obtained but does not show any acute emergent pathology. Patient is given Antivert and upon reevaluation she is still unable to ambulate due to dizziness. Feel patient is appropriate for aspirin and admission for neurology evaluation and possible MRI. Patient expresses understanding and agreement with this plan and the patient was admitted for further care. Given concern for possible CVA and central nervous system pathology and need for advanced imaging and frequent reevaluation the patient does require critical care time.      Critical Care    Date/Time: 7/22/2022 4:46 PM  Performed by: Laron Chen MD  Authorized by: Laron Chen MD     Critical care provider statement:     Critical care time (minutes):  31    Critical care time was exclusive of:  Separately billable procedures and treating other patients and teaching time    Critical care was necessary to treat or prevent imminent or life-threatening deterioration of the following conditions:  CNS failure or compromise    Critical care was time spent personally by me on the following activities:  Ordering and performing treatments and interventions, development of treatment plan with patient or surrogate, ordering and review of laboratory studies, discussions with consultants, ordering and review of radiographic studies, evaluation of patient's response to treatment, re-evaluation of patient's condition, examination of patient and obtaining history from patient or surrogate    FINAL IMPRESSION      1. Dizziness    2. Unable to ambulate          DISPOSITION/PLAN   DISPOSITION Decision To Admit 07/22/2022 04:42:21 PM    (Please note that portions of this note were completed with a voice recognition program.  Efforts were made to edit the dictations but occasionally words aremis-transcribed. )    Devante Morocho MD (electronically signed)  Attending Emergency Physician           Devante Morocho MD  07/22/22 8573

## 2022-07-23 ENCOUNTER — APPOINTMENT (OUTPATIENT)
Dept: MRI IMAGING | Age: 72
DRG: 067 | End: 2022-07-23
Payer: MEDICARE

## 2022-07-23 LAB
CHOLESTEROL, TOTAL: 141 MG/DL (ref 0–199)
EKG ATRIAL RATE: 55 BPM
EKG DIAGNOSIS: NORMAL
EKG Q-T INTERVAL: 410 MS
EKG QRS DURATION: 140 MS
EKG QTC CALCULATION (BAZETT): 481 MS
EKG R AXIS: -24 DEGREES
EKG T AXIS: 10 DEGREES
EKG VENTRICULAR RATE: 83 BPM
ESTIMATED AVERAGE GLUCOSE: 116.9 MG/DL
HBA1C MFR BLD: 5.7 %
HCT VFR BLD CALC: 38.3 % (ref 36–48)
HDLC SERPL-MCNC: 55 MG/DL (ref 40–60)
HEMOGLOBIN: 12.7 G/DL (ref 12–16)
LDL CHOLESTEROL CALCULATED: 67 MG/DL
LV EF: 38 %
LVEF MODALITY: NORMAL
MCH RBC QN AUTO: 29.2 PG (ref 26–34)
MCHC RBC AUTO-ENTMCNC: 33.2 G/DL (ref 31–36)
MCV RBC AUTO: 88 FL (ref 80–100)
PDW BLD-RTO: 15.7 % (ref 12.4–15.4)
PLATELET # BLD: 206 K/UL (ref 135–450)
PMV BLD AUTO: 7.5 FL (ref 5–10.5)
RBC # BLD: 4.35 M/UL (ref 4–5.2)
TRIGL SERPL-MCNC: 96 MG/DL (ref 0–150)
VLDLC SERPL CALC-MCNC: 19 MG/DL
WBC # BLD: 6.1 K/UL (ref 4–11)

## 2022-07-23 PROCEDURE — 97530 THERAPEUTIC ACTIVITIES: CPT

## 2022-07-23 PROCEDURE — 97116 GAIT TRAINING THERAPY: CPT

## 2022-07-23 PROCEDURE — 6370000000 HC RX 637 (ALT 250 FOR IP): Performed by: INTERNAL MEDICINE

## 2022-07-23 PROCEDURE — 94760 N-INVAS EAR/PLS OXIMETRY 1: CPT

## 2022-07-23 PROCEDURE — 97161 PT EVAL LOW COMPLEX 20 MIN: CPT

## 2022-07-23 PROCEDURE — 93010 ELECTROCARDIOGRAM REPORT: CPT | Performed by: INTERNAL MEDICINE

## 2022-07-23 PROCEDURE — 97165 OT EVAL LOW COMPLEX 30 MIN: CPT

## 2022-07-23 PROCEDURE — 6360000004 HC RX CONTRAST MEDICATION: Performed by: INTERNAL MEDICINE

## 2022-07-23 PROCEDURE — 83036 HEMOGLOBIN GLYCOSYLATED A1C: CPT

## 2022-07-23 PROCEDURE — 70551 MRI BRAIN STEM W/O DYE: CPT

## 2022-07-23 PROCEDURE — 2060000000 HC ICU INTERMEDIATE R&B

## 2022-07-23 PROCEDURE — 94640 AIRWAY INHALATION TREATMENT: CPT

## 2022-07-23 PROCEDURE — 85027 COMPLETE CBC AUTOMATED: CPT

## 2022-07-23 PROCEDURE — 97535 SELF CARE MNGMENT TRAINING: CPT

## 2022-07-23 PROCEDURE — 92610 EVALUATE SWALLOWING FUNCTION: CPT

## 2022-07-23 PROCEDURE — 80061 LIPID PANEL: CPT

## 2022-07-23 PROCEDURE — C8929 TTE W OR WO FOL WCON,DOPPLER: HCPCS

## 2022-07-23 PROCEDURE — 36415 COLL VENOUS BLD VENIPUNCTURE: CPT

## 2022-07-23 RX ORDER — MECLIZINE HCL 12.5 MG/1
12.5 TABLET ORAL 3 TIMES DAILY PRN
Status: DISCONTINUED | OUTPATIENT
Start: 2022-07-23 | End: 2022-07-29 | Stop reason: HOSPADM

## 2022-07-23 RX ORDER — OXYBUTYNIN CHLORIDE 5 MG/1
5 TABLET ORAL 2 TIMES DAILY
Status: DISCONTINUED | OUTPATIENT
Start: 2022-07-23 | End: 2022-07-25

## 2022-07-23 RX ORDER — PHENAZOPYRIDINE HYDROCHLORIDE 200 MG/1
200 TABLET, FILM COATED ORAL
Status: DISCONTINUED | OUTPATIENT
Start: 2022-07-23 | End: 2022-07-24

## 2022-07-23 RX ORDER — ALBUTEROL SULFATE 90 UG/1
2 AEROSOL, METERED RESPIRATORY (INHALATION) 2 TIMES DAILY
Status: DISCONTINUED | OUTPATIENT
Start: 2022-07-23 | End: 2022-07-29 | Stop reason: HOSPADM

## 2022-07-23 RX ADMIN — OXYBUTYNIN CHLORIDE 5 MG: 5 TABLET ORAL at 12:55

## 2022-07-23 RX ADMIN — ALBUTEROL SULFATE 2 PUFF: 90 AEROSOL, METERED RESPIRATORY (INHALATION) at 15:36

## 2022-07-23 RX ADMIN — ASPIRIN 81 MG: 81 TABLET, COATED ORAL at 08:54

## 2022-07-23 RX ADMIN — PERFLUTREN 1.65 MG: 6.52 INJECTION, SUSPENSION INTRAVENOUS at 11:11

## 2022-07-23 RX ADMIN — RIVAROXABAN 20 MG: 20 TABLET, FILM COATED ORAL at 16:40

## 2022-07-23 RX ADMIN — ATORVASTATIN CALCIUM 80 MG: 80 TABLET, FILM COATED ORAL at 22:54

## 2022-07-23 RX ADMIN — OXYBUTYNIN CHLORIDE 5 MG: 5 TABLET ORAL at 22:54

## 2022-07-23 RX ADMIN — ALBUTEROL SULFATE 2 PUFF: 90 AEROSOL, METERED RESPIRATORY (INHALATION) at 19:46

## 2022-07-23 RX ADMIN — PHENAZOPYRIDINE HYDROCHLORIDE 200 MG: 200 TABLET ORAL at 16:40

## 2022-07-23 RX ADMIN — ALBUTEROL SULFATE 2 PUFF: 90 AEROSOL, METERED RESPIRATORY (INHALATION) at 07:45

## 2022-07-23 RX ADMIN — PHENAZOPYRIDINE HYDROCHLORIDE 200 MG: 200 TABLET ORAL at 12:55

## 2022-07-23 RX ADMIN — METOPROLOL SUCCINATE 50 MG: 50 TABLET, EXTENDED RELEASE ORAL at 08:54

## 2022-07-23 NOTE — PROGRESS NOTES
Physical Therapy  Facility/Department: [address] PROGRESSIVE CARE  Physical Therapy Initial Assessment  If patient discharges prior to next session this note will serve as a discharge summary. Please see below for the latest assessment towards goals. Name: Janelle Le  : 1950  MRN: [id number]  Date of Service: 2022    Discharge Recommendations:  Home with assist PRN, Home with Home health PT, Patient would benefit from continued therapy after discharge   Janelle Le scored a 18/24 on the AM-PAC short mobility form. Current research shows that an AM-PAC score of 18 or greater is typically associated with a discharge to the patient's home setting. Based on the patient's AM-PAC score and their current functional mobility deficits, it is recommended that the patient have 2-3 sessions per week of Physical Therapy at d/c to increase the patient's independence. At this time, this patient demonstrates the endurance and safety to discharge home with prn assist and home PT (home vs OP services) and a follow up treatment frequency of 2-3x/wk. Please see assessment section for further patient specific details. PT Equipment Recommendations  Equipment Needed: No      Patient Diagnosis(es): The primary encounter diagnosis was Dizziness. A diagnosis of Unable to ambulate was also pertinent to this visit. Past Medical History:  has a past medical history of Arthritis, Atrial fibrillation ([address] Depression, Hyperlipidemia, and Hypertension. Past Surgical History:  has a past surgical history that includes  section; Cardiac catheterization; and hernia repair. Assessment   Assessment: The pt is a 72 yo female who presented to the ED with 2 day h/o of dizziness and is being worked up for a TIA. Imaging negative for changes. The pt reports she lives alone in a 1st floor apaprtment. She reports she did not need to use s device for ambulation prior to this week but has 3 rollators at home.  The pt has a HHA for IADL's but reports being indep in self-care. PMHx: arth, a-fib, depression, HTN    Today, the pt demonstrated that she is most likely functioning close to her baseline but is needing the rollator for safe ambulation at the present time. Anticipate that the pt will be safe for home with prn assist and home PT at d/c. Will con't to follow while on the acute care floor. Therapy Prognosis: Good  Decision Making: Low Complexity  Requires PT Follow-Up: Yes  Activity Tolerance  Activity Tolerance: Patient limited by fatigue;Patient limited by endurance  Activity Tolerance Comments: HR increased to 120's with grooming activity and the pt became SOB     Plan   Plan  Plan: 3-5 times per week  Current Treatment Recommendations: Strengthening, ROM, Balance training, Functional mobility training, Transfer training, Gait training, Stair training, Safety education & training, Patient/Caregiver education & training, Equipment evaluation, education, & procurement, Therapeutic activities  Safety Devices  Type of Devices: Call light within reach, Chair alarm in place, Gait belt, Patient at risk for falls, Left in chair     Restrictions  Restrictions/Precautions  Restrictions/Precautions: Fall Risk     Subjective   General  Chart Reviewed: Yes  Patient assessed for rehabilitation services?: Yes  Additional Pertinent Hx: KEELEY Woods CNP    H&P on 7-: The pt is a 72 yo female who presented to the ED with 2 day h/o of dizziness and is being worked up for a TIA. Imaging negative for changes.      PMHx: arth, a-fib, depression, HTN  Response To Previous Treatment: Not applicable  Family / Caregiver Present: No  Referring Practitioner: Tee Alcantar MD  Referral Date : 07/22/22  Diagnosis: Possible TIA/CVA; dizziness/positional vertigo  Follows Commands: Within Functional Limits  Subjective  Subjective: the pt was found to be up in the chair already; she is a poor historian but denied pain; only reported \"dizziness\" at the end of session after walking         Social/Functional History  Social/Functional History  Lives With: Alone  Type of Home: Apartment  Home Layout: One level, Laundry in basement  Home Access: Stairs to enter with rails  Entrance Stairs - Number of Steps: 3 CHANDNI  Bathroom Shower/Tub: Tub/Shower unit  Bathroom Toilet:  (BSC)  Bathroom Equipment: Grab bars in shower, Shower chair  Home Equipment: [address], 4 wheeled  Has the patient had two or more falls in the past year or any fall with injury in the past year?:  (a lot of \"close calls\" recently d/t dizziness; 2 falls in the past year)  Receives Help From: Home health (HHA 2d/wk for 2-3hrs each day- assist with household tasks)  ADL Assistance: Independent  Homemaking Assistance:  (BF does laundry and does grocery shopping; HHA cleans; Gets MOW and heats up in microwave)  Ambulation Assistance: Independent (no AD at home, 4WW the past few days d/t dizziness)  Transfer Assistance: Independent  Active : No  Vision/Hearing  Vision  Vision: Impaired (\"supposed to wear glasses but I don't\")  Hearing  Hearing: Exceptions to [facility]  Hearing Exceptions: Hard of hearing/hearing concerns; No hearing aid    Cognition   Orientation  Overall Orientation Status: Within Functional Limits  Cognition  Overall Cognitive Status: Exceptions  Safety Judgement: Decreased awareness of need for safety;Decreased awareness of need for assistance  Cognition Comment: poor historian     Objective     Gross Assessment  AROM: Within functional limits  Strength: Generally decreased, functional  Tone: Normal  Sensation: Intact     Transfers  Sit to Stand: Stand by assistance  Stand to sit: Stand by assistance  Ambulation  Surface: level tile  Device: Rollator  Assistance: Stand by assistance  Quality of Gait: slightly flexed posturel moderate pace, able to manage the rollator well without LOB  Distance: 25 feet x 1, 30 feet x 1  Comments: the pt completed grooming at the sink in standing during session  More Ambulation?: No  Stairs/Curb  Stairs?: No     Balance  Sitting - Static: Good  Sitting - Dynamic: Good  Standing - Static: Good (at the sink)  Standing - Dynamic: Good (with rollator)  Comments: the pt statically stood at the sink x 2-3 minutes with SBA for brushing teeth             AM-PAC Score  AM-PAC Inpatient Mobility Raw Score : 18 (07/23/22 0749)  AM-PAC Inpatient T-Scale Score : 43.63 (07/23/22 0749)  Mobility Inpatient CMS 0-100% Score: 46.58 (07/23/22 0749)  Mobility Inpatient CMS G-Code Modifier : CK (07/23/22 0749)            Goals  Short Term Goals  Time Frame for Short term goals: upon d/c  Short term goal 1: Bed mobility with mod I. Short term goal 2: Transfers sit <> stand with mod I. Short term goal 3: Ambulate with rollator 100 feet with Sup/mod I. Short term goal 4: Progress to ambulation w/o device in the room with SBA. Short term goal 5: Negotiate steps with rails with CGA.   Patient Goals   Patient goals : to go home       Education  Patient Education  Education Given To: Patient  Education Provided: Role of Therapy;Plan of Care  Education Method: Demonstration;Verbal  Barriers to Learning: Cognition  Education Outcome: Verbalized understanding;Continued education needed      Therapy Time   Individual Concurrent Group Co-treatment   Time In 0708         Time Out 0746         Minutes 38         Timed Code Treatment Minutes: 23 Minutes     Electronically signed by Tracy Katz, PT [id number] on 7/23/2022 at 7:49 AM

## 2022-07-23 NOTE — ED NOTES
[address] [facility] [address] transport here picking up pt     Torie Disla, ALYSSA  07/22/22 1192

## 2022-07-23 NOTE — PLAN OF CARE
Problem: Discharge Planning  Goal: Discharge to home or other facility with appropriate resources  7/23/2022 0323 by Rae Noonan, RN  Outcome: Progressing  7/23/2022 0323 by Rae Noonan, RN  Outcome: Progressing

## 2022-07-23 NOTE — PROGRESS NOTES
Hospitalist Progress Note      PCP: Ronalee Curling, MD    Date of Admission: 7/22/2022    Subjective: still with some dizziness    Medications:  Reviewed    Infusion Medications   Scheduled Medications    phenazopyridine  200 mg Oral TID WC    oxybutynin  5 mg Oral BID    albuterol sulfate HFA  2 puff Inhalation 4x daily    metoprolol succinate  50 mg Oral Daily    rivaroxaban  20 mg Oral Dinner    aspirin  81 mg Oral Daily    Or    aspirin  300 mg Rectal Daily    atorvastatin  80 mg Oral Nightly     PRN Meds: ondansetron **OR** ondansetron, polyethylene glycol, labetalol      Intake/Output Summary (Last 24 hours) at 7/23/2022 1534  Last data filed at 7/23/2022 1318  Gross per 24 hour   Intake 680 ml   Output --   Net 680 ml       Physical Exam Performed:    BP (!) 140/75   Pulse 79   Temp 97.6 °F (36.4 °C) (Oral)   Resp 19   Wt 276 lb 0.3 oz (125.2 kg)   SpO2 96%   BMI 43.23 kg/m²        General appearance:  Well developed, well nourished, older  female lying on hospital bed in no apparent distress, appears stated age and cooperative. HEENT:  Normal cephalic, atraumatic without obvious deformity. Pupils equal, round, and reactive to light. Conjunctivae/corneas clear. Neck: Supple, with full range of motion. No jugular venous distention. Trachea midline. Respiratory:  Normal respiratory effort. Clear to auscultation, bilaterally without accessory muscle use. Cardiovascular:  Regular rate and rhythm without murmurs no lower extremity edema. Abdomen: Soft, obese abdomen, non-tender, non-distended, without rebound or guarding. Normal bowel sounds. Musculoskeletal:  Moves all extremities equally. Full range of motion without deformity. Skin: Skin warm, dry and intact. No rashes or lesions. Neurologic:  Neurovascularly intact without any focal sensory/motor deficits.  Cranial nerves: II-XII intact, grossly non-focal.  Psychiatric:  Alert and oriented, thought content appropriate, normal insight  Capillary Refill: Brisk,< 3 seconds  Peripheral Pulses: +2 palpable, equal bilaterally       Labs:   Recent Labs     07/22/22  1454 07/23/22  0336   WBC 7.3 6.1   HGB 13.3 12.7   HCT 40.4 38.3    206     Recent Labs     07/22/22  1454      K 4.5      CO2 20*   BUN 16   CREATININE 0.6   CALCIUM 8.8     Recent Labs     07/22/22  1454   AST 14*   ALT <5*   BILITOT 1.6*   ALKPHOS 123     No results for input(s): INR in the last 72 hours. Recent Labs     07/22/22  1454   TROPONINI <0.01       Urinalysis:      Lab Results   Component Value Date/Time    NITRU Negative 07/22/2022 04:15 PM    WBCUA 3-5 07/22/2022 04:15 PM    BACTERIA 2+ 07/22/2022 04:15 PM    RBCUA 3-4 07/22/2022 04:15 PM    BLOODU SMALL 07/22/2022 04:15 PM    SPECGRAV 1.010 07/22/2022 04:15 PM    GLUCOSEU Negative 07/22/2022 04:15 PM       Radiology:  MRI brain without contrast   Final Result   1. No acute intracranial abnormality. No acute infarct. 2. Mild-to-moderate global parenchymal volume loss with chronic microvascular   ischemic changes. CTA HEAD NECK W CONTRAST   Final Result   No CT evidence of an acute infarct. No flow limiting stenosis or large vessel occlusion detected within the head   or neck. CT HEAD WO CONTRAST   Final Result   No CT evidence of an acute infarct. No flow limiting stenosis or large vessel occlusion detected within the head   or neck. XR CHEST PORTABLE   Final Result   No acute process.       Stable cardiomegaly                 Assessment/Plan:    Active Hospital Problems    Diagnosis     TIA (transient ischemic attack) [G45.9]      Priority: Medium         Possible TIA/CVA  - with symptoms: dizziness/positional vertigo  - admit to OBS to RO TIA/CVA  - out of the tPA window  - head CT neg for acute pathology  - CTA head/neck: No flow limiting stenosis or large vessel occlusion  - MRI neg for acute infarct  - await echo  - started on ASA, statin  - check lipids, HBA1c  - try meclizine     Atrial Fibrillation  - currently rate controlled  - continue Xarelto and metoprolol      Essential (primary) hypertension  - monitor blood pressure  - continue home meds     Hyperlipidemia  - continue statin       DVT Prophylaxis: Xarelto  Diet: ADULT DIET;  Regular  Code Status: Full Code    PT/OT Eval Status: ordered    Dispo - cont care, poss dc in am    Adrián Cantor MD

## 2022-07-23 NOTE — H&P
Hospital Medicine History & Physical      PCP: Zeinab Olivier MD    Date of Admission: 2022    Date of Service: Pt seen/examined on 2022 and Placed in Observation. Chief Complaint:  dizziness since tuesday      History Of Present Illness:      73 y.o. female with PMHx of A-fib, HTN and HLD presented to [facility] in transfer from [address] [place] [address] emergency department for evaluation of TIA. Patient reports 2-day history of dizziness. Patient reports dizziness with standing. Describes this as a room spinning. No difficulty with speech or swallowing. No unilateral weakness. Denies headache or neck pain. Past Medical History:          Diagnosis Date    Arthritis     Atrial fibrillation ([address]     Depression     Hyperlipidemia     Hypertension        Past Surgical History:          Procedure Laterality Date    CARDIAC CATHETERIZATION       SECTION      HERNIA REPAIR         Medications Prior to Admission:      Prior to Admission medications    Medication Sig Start Date End Date Taking?  Authorizing Provider   diclofenac sodium (VOLTAREN) 1 % GEL Apply topically 4 times daily as needed for Pain   Yes Historical Provider, MD   ondansetron (ZOFRAN ODT) 4 MG disintegrating tablet Take 1 tablet by mouth every 8 hours as needed for Nausea 6/15/22   Sarah Quintero MD   albuterol sulfate HFA (VENTOLIN HFA) 108 (90 Base) MCG/ACT inhaler Inhale 2 puffs into the lungs 4 times daily for 5 days 19  Devendra Mccullough PA-C   rivaroxaban (XARELTO) 20 MG TABS tablet Take 1 tablet by mouth daily 17   Prasanna Berry MD   metoprolol succinate (TOPROL XL) 50 MG extended release tablet Take 1 tablet by mouth daily 17   Prasanna Berry MD   furosemide (LASIX) 20 MG tablet Take 1 tablet by mouth daily 17   Prasanna Berry MD   HYDROcodone-acetaminophen (Recinos Jolly) 7.5-325 MG per tablet One pill every six hours as needed 17   Historical Provider, MD   simvastatin (ZOCOR) 20 MG tablet ONE PILL AT BEDTIME 3/14/16   Historical Provider, MD   lisinopril (PRINIVIL;ZESTRIL) 5 MG tablet One pill every day 11/11/16   Historical Provider, MD   tiZANidine (ZANAFLEX) 4 MG tablet One pill every eight hours as needed 11/11/16   Historical Provider, MD       Allergies:  Pcn [penicillins]    Social History:        TOBACCO:   reports that she has never smoked. She has never used smokeless tobacco.  ETOH:   reports no history of alcohol use. Family History:      Reviewed in detail positive as follows:        Problem Relation Age of Onset    Diabetes Mother     High Cholesterol Father     High Blood Pressure Father     Diabetes Maternal Uncle     Diabetes Maternal Grandmother        REVIEW OF SYSTEMS:   Pertinent positives as noted in the HPI. All other systems reviewed and negative. PHYSICAL EXAM PERFORMED:    /70   Pulse 85   Temp 97.6 °F (36.4 °C) (Oral)   Resp 21   Wt 261 lb (118.4 kg)   SpO2 98%   BMI 40.88 kg/m²     General appearance:  Well developed, well nourished, older  female lying on hospital bed in no apparent distress, appears stated age and cooperative. HEENT:  Normal cephalic, atraumatic without obvious deformity. Pupils equal, round, and reactive to light. Conjunctivae/corneas clear. Neck: Supple, with full range of motion. No jugular venous distention. Trachea midline. Respiratory:  Normal respiratory effort. Clear to auscultation, bilaterally without accessory muscle use. Cardiovascular:  Regular rate and rhythm without murmurs no lower extremity edema. Abdomen: Soft, obese abdomen, non-tender, non-distended, without rebound or guarding. Normal bowel sounds. Musculoskeletal:  Moves all extremities equally. Full range of motion without deformity. Skin: Skin warm, dry and intact. No rashes or lesions. Neurologic:  Neurovascularly intact without any focal sensory/motor deficits.  Cranial nerves: II-XII intact, grossly non-focal.  Psychiatric:  Alert and oriented, thought content appropriate, normal insight  Capillary Refill: Brisk,< 3 seconds   Peripheral Pulses: +2 palpable, equal bilaterally       Labs:     Recent Labs     07/22/22  1454   WBC 7.3   HGB 13.3   HCT 40.4        Recent Labs     07/22/22  1454      K 4.5      CO2 20*   BUN 16   CREATININE 0.6   CALCIUM 8.8     Recent Labs     07/22/22  1454   AST 14*   ALT <5*   BILITOT 1.6*   ALKPHOS 123     No results for input(s): INR in the last 72 hours. Recent Labs     07/22/22  1454   TROPONINI <0.01       Urinalysis:      Lab Results   Component Value Date/Time    NITRU Negative 07/22/2022 04:15 PM    WBCUA 3-5 07/22/2022 04:15 PM    BACTERIA 2+ 07/22/2022 04:15 PM    RBCUA 3-4 07/22/2022 04:15 PM    BLOODU SMALL 07/22/2022 04:15 PM    SPECGRAV 1.010 07/22/2022 04:15 PM    GLUCOSEU Negative 07/22/2022 04:15 PM       Radiology:       CTA HEAD NECK W CONTRAST   Final Result   No CT evidence of an acute infarct. No flow limiting stenosis or large vessel occlusion detected within the head   or neck. CT HEAD WO CONTRAST   Final Result   No CT evidence of an acute infarct. No flow limiting stenosis or large vessel occlusion detected within the head   or neck. XR CHEST PORTABLE   Final Result   No acute process.       Stable cardiomegaly         MRI brain without contrast    (Results Pending)       ASSESSMENT:    Active Hospital Problems    Diagnosis Date Noted    TIA (transient ischemic attack) [G45.9] 07/22/2022     Priority: Medium         PLAN:    Possible TIA/CVA  - with symptoms: dizziness/positional vertigo  - admit to OBS to RO TIA/CVA  - out of the tPA window  - head CT neg for acute pathology  - CTA head/neck: No flow limiting stenosis or large vessel occlusion  - MRI   - ASA, statin  - consult neurology   - check lipids, HBA1c  - allow permissive HTN, SBP < 220, DBP < 110    Atrial Fibrillation   - currently rate controlled  - continue Xarelto and metoprolol     Essential (primary) hypertension   - monitor blood pressure  - continue home meds     Hyperlipidemia   - continue statin    DVT Prophylaxis: Xarelto  Diet: ADULT DIET; Regular  Code Status: Full Code    PT/OT Eval Status: pending    [address] [facility], APRN - CNP    Thank you Marisol Taveras MD for the opportunity to be involved in this patient's care. If you have any questions or concerns please feel free to contact me at [phone].

## 2022-07-23 NOTE — PROGRESS NOTES
Facility/Department: [facility] 4U PROGRESSIVE CARE  Initial Assessment  DYSPHAGIA BEDSIDE SWALLOW EVALUATION     Patient: Laci Massey   : 1950   MRN: [id number]      Evaluation Date: 2022   Admitting Diagnosis: TIA (transient ischemic attack) [G45.9]  Dizziness [R42]  Unable to ambulate [R26.2]  Pain: Denies                                                         Chart Review:   H&P: 73 y.o. female with PMHx of A-fib, HTN and HLD presented to [facility] in transfer from [address] [place] [address] emergency department for evaluation of TIA. Patient reports 2-day history of dizziness. Patient reports dizziness with standing. Describes this as a room spinning. No difficulty with speech or swallowing. No unilateral weakness. Denies headache or neck pain. Current Diet Level (prior to evaluation): Regular texture diet  Thin liquids    Respiratory Status:   [x]Room Air   []O2 via nasal cannula   []Other:    Imaging:  MRI brain 22  Impression   1. No acute intracranial abnormality. No acute infarct. 2. Mild-to-moderate global parenchymal volume loss with chronic microvascular   ischemic changes. CT head 22  Impression   No CT evidence of an acute infarct. No flow limiting stenosis or large vessel occlusion detected within the head   or neck. Modified Barium Swallow Study: any recent history in chart review: none documented    Reason for referral: SLP evaluation orders received due to CVA protocol     History/Prior Level of Function:   Living Status: Lives alone  Baseline diet: Regular texture, thin liquids  Prior Dysphagia History: None documented    Dysphagia Impressions/Diagnosis: Oropharyngeal Dysphagia   OROPHARYNGEAL DYSPHAGIA DIAGNOSIS: Appears WFL  Accepted and tolerated evaluation   Oriented x4, able to follow complex dx and verbally responsive  Pt reports occasionally having trouble with swallowing when she takes too large of a bite.  Patient noted to take large bites of chicken during lunch resulting in prolonged but effective mastication of bolus with adequate clearance. Timely swallow initiation, adequate laryngeal elevation. No overt s/s of aspiration or penetration  Recommend cont reg/thin   ST to follow up 1x due to pt report of occasionally having trouble swallowing    2. MEDICAL OR COGNITIVE/BEHAVIORAL FACTORS WHICH CAN EXACERBATE: TBD    Recommended Diet and Intervention 7/23/2022:  Diet Solids Recommendation:  Regular texture diet  Liquid Consistency Recommendation: Thin liquids  Recommended form of Meds: Whole with water       Compensatory Swallowing Strategies: Alternate solids/liquids , Upright as possible with all PO intake , Small bites/sips , Eat/feed slowly    SHORT TERM DYSPHAGIA GOALS/PLAN OF CARE: Speech therapy for dysphagia tx 1-2 times to ensure diet tolerance. Pt will functionally tolerate recommended diet with no overt clinical s/s of aspiration   Pt will demonstrate understanding of aspiration risk and precautions via education/demonstration with occasional prompting       Dysphagia Therapeutic Intervention:  Diet Tolerance Monitoring , Patient/Family Education     Dysphagia Prognosis: [x] good []fair  []guarded []poor     Impulsivity: large bites, rapid rate of intake       Discharge Recommendations: Do not anticipate need for further speech/dysphagia therapy upon discharge from hospital       TEST DATA  Vision: adequate for assessment needs  Hearing: adequate for assessment needs    Consistencies Presented:    Thin liquid;   Soft/bite size food  Regular solid food    Cognitive/behavioral:   []orientation [x] to self [x] place [x] date [x] reason for admit [] purpose of evaluation  []distractible  []verbally responsive  []follows one step commands  []agitated  []impulsive  [] other:       Patient Positioning: Upright in chair    Dentition:  []Adequate  []Dentures   []Missing Many Teeth  []Edentulous  []Other: poor dental health    Baseline Vocal Quality:  [x]Normal  []Dysphonic   []Aphonic   []Hoarse  []Wet  []Weak  []Other:    Volitional Cough:  [x]Strong  []Weak  []Wet  []Absent  []Congested  []Other:    Volitional Swallow:   []Absent   []Delayed     []Adequate     []Required use of drink     Oral Mechanism Exam:  [x]WFL []Mild   [] Moderate  []Severe  []To be assessed  Impaired:   []Left side      []Right side    []Labial ROM/Coordination    []Labial Symmetry   []Lingual ROM/Coordination   []Lingual Symmetry  []Gag  []Other:     Oral Phase: []WFL [x]Mild   [] Moderate  []Severe  []To be assessed   [x]Impaired/Prolonged Mastication:   []Spillage Left:   []Spillage Right:  []Pocketing Left:   []Pocketing Right:   []Decreased Anterior to Posterior Transit:   []Suspected Premature Bolus Loss:   []Lingual/Palatal Residue:   []Other:     Pharyngeal Phase: [x]WFL []Mild   [] Moderate  []Severe  []To be assessed   []Delayed Swallow:   []Suspected Pharyngeal Pooling:   []Decreased Laryngeal Elevation:   []Absent Swallow:  []Wet Vocal Quality:   []Throat Clearing-Immediate:   []Throat Clearing-Delayed:   []Cough-Immediate:   []Cough-Delayed:  []Change in Vital Signs:  []Suspected Delayed Pharyngeal Clearing:  []Other:       Eating Assistance:  [x]Independent  []Setup or clean-up assistance   [] Supervision or touching assistance   [] Partial or moderate assistance   [] Substantial or maximal assistance  [] Dependent       EDUCATION:   Provided education regarding role of SLP, results of assessment, recommendations and general speech pathology plan of care. [x] Pt verbalized understanding and agreement   [] Pt requires ongoing learning   [] No evidence of comprehension     If patient discharges prior to next visit, this note will serve as discharge.      Timed Code Minutes: 0  Total Treatment Minutes: 20    Electronically signed by:      Greta Phillips, [address] [place] [address]  Speech-Language Pathologist  On 07/23/22 at 12:39 PM

## 2022-07-23 NOTE — PLAN OF CARE
Problem: Discharge Planning  Goal: Discharge to home or other facility with appropriate resources  7/23/2022 1007 by Yara Eugene RN  Outcome: Progressing  7/23/2022 0323 by Gavino Means RN  Outcome: Progressing  7/23/2022 0323 by Gavino Means RN  Outcome: Progressing

## 2022-07-23 NOTE — PROGRESS NOTES
Occupational Therapy  Facility/Department: [address] PROGRESSIVE CARE  Occupational Therapy Initial Assessment    Name: Viraj Baig  : 1950  MRN: [id number]  Date of Service: 2022    Discharge Recommendations:  Home with assist PRN  OT Equipment Recommendations  Equipment Needed: No     Viraj Baig scored a 20/24 on the [address] form. At this time, no further OT is recommended upon discharge. Recommend patient returns to prior setting with prior services. Patient Diagnosis(es): The primary encounter diagnosis was Dizziness. A diagnosis of Unable to ambulate was also pertinent to this visit. Past Medical History:  has a past medical history of Arthritis, Atrial fibrillation ([facility] [address] Depression, Hyperlipidemia, and Hypertension. Past Surgical History:  has a past surgical history that includes  section; Cardiac catheterization; and hernia repair. Assessment   Performance deficits / Impairments: Decreased functional mobility ; Decreased safe awareness;Decreased ADL status; Decreased high-level IADLs;Decreased endurance  Assessment: 74 y/o female admitted 2022 with dizziness. PTA pt lives at home alone and was independent with ADLs and functional mobility with 4WW recently. Today, pt required SBA for transfers and functional mobility around room/bathroom with 4WW. Pt stood at sink with carla forearm support for balance during grooming tasks. Pt did become slightly SOB with activity but recovered with rest- pt reports baseline. Pt is functioning close to baseline and will benefit from skilled therapy in hospital to prevent further decline. Anticipate pt will be safe to return home at discharge. Pt reports boyfriend can assist as needed.   Prognosis: Good  Decision Making: Low Complexity  REQUIRES OT FOLLOW-UP: Yes  Activity Tolerance  Activity Tolerance: Patient Tolerated treatment well        Plan   Plan  Times per Week: 3-5  Current Treatment Recommendations: Strengthening, Balance training, Functional mobility training, Endurance training, Self-Care / ADL, Patient/Caregiver education & training     Restrictions  Restrictions/Precautions  Restrictions/Precautions: Fall Risk    Subjective   General  Chart Reviewed: Yes  Patient assessed for rehabilitation services?: Yes  Additional Pertinent Hx: Per H&P: \"80 y.o. female with PMHx of A-fib, HTN and HLD presented to [facility] in transfer from [address] [place] [address] emergency department for evaluation of TIA. Patient reports 2-day history of dizziness. \" CT head negative. MRI pending  Family / Caregiver Present: No  Referring Practitioner: Dr. Steffany Key  Diagnosis: Dizziness  Subjective  Subjective: Pt seen bedside and agreeable to therapy. Pt reports not feeling back to baseline but unable to elaborate. Social/Functional History  Social/Functional History  Lives With: Alone  Type of Home: Apartment  Home Layout: One level, Laundry in basement  Home Access: Stairs to enter with rails  Entrance Stairs - Number of Steps: 3 CHANDNI  Bathroom Shower/Tub: Tub/Shower unit  Bathroom Toilet:  (BSC)  Bathroom Equipment: Grab bars in shower, Shower chair  Home Equipment: Bill Janus, 4 wheeled  Has the patient had two or more falls in the past year or any fall with injury in the past year?:  (a lot of \"close calls\" recently d/t dizziness; 2 falls in the past year)  Receives Help From: Home health (HHA 2d/wk for 2-3hrs each day- assist with household tasks)  ADL Assistance: Independent  Homemaking Assistance:  (BF does laundry and does grocery shopping; HHA cleans; Gets MOW and heats up in microwave)  Ambulation Assistance: Independent (no AD at home, 4WW the past few days d/t dizziness)  Transfer Assistance: Independent  Active : No       Objective     Safety Devices  Type of Devices: Call light within reach; Chair alarm in place;Gait belt;Patient at risk for falls; Left in chair        AROM: Within functional limits  Strength:  Within functional limits  Coordination: Within functional limits    ADL  Grooming: Stand by assistance (standing at sink with carla forearm support to brush teeth)  LE Dressing:  (unable to reach feet to wilbur socks, reports \"I don't wear them at home\")  Additional Comments: Anticipate pt will require CGA/SBA for standing components of ADLs based on balance and endurance observed     Activity Tolerance  Activity Tolerance: Patient limited by fatigue;Patient limited by endurance  Activity Tolerance Comments: HR increased to 120's with grooming activity and the pt became SOB    Bed mobility  Bed Mobility Comments: Pt in chair at start/end of session    Pt stood at sink ~ 1 min to brush teeth with SBA. Transfers  Sit to stand: Stand by assistance  Stand to sit: Stand by assistance  Transfer Comments: Pt stood from chair, ambulated to bathroom and around room with 4WW and SBA. Vision  Vision: Impaired (\"supposed to wear glasses but I don't\")  Hearing  Hearing: Exceptions to [facility]  Hearing Exceptions: Hard of hearing/hearing concerns; No hearing aid    Cognition  Overall Cognitive Status: Exceptions  Safety Judgement: Decreased awareness of need for safety;Decreased awareness of need for assistance  Cognition Comment: poor historian  Orientation  Overall Orientation Status: Within Functional Limits                  Education Given To: Patient  Education Provided: Role of Therapy;Plan of Care;Transfer Training  Education Method: Demonstration;Verbal  Barriers to Learning: None  Education Outcome: Verbalized understanding    LUE AROM (degrees)  LUE AROM : WFL  Left Hand AROM (degrees)  Left Hand AROM: WFL  RUE AROM (degrees)  RUE AROM : WFL  Right Hand AROM (degrees)  Right Hand AROM: [facility]          AM-PAC Score    AM-PAC Inpatient Daily Activity Raw Score: 20 (07/23/22 0744)  AM-PAC Inpatient ADL T-Scale Score : 42.03 (07/23/22 0744)  ADL Inpatient CMS 0-100% Score: 38.32 (07/23/22 0744)  ADL Inpatient CMS G-Code Modifier : Chao Avila (07/23/22 12)    Goals  Short Term Goals  Time Frame for Short term goals: Prior to DC: Short Term Goal 1: Pt will complete ADL transfer/mobility with mod I  Short Term Goal 2: Pt will tolerate standing > 3 min for functional task with supervision  Short Term Goal 3: Pt will complete toileting with supervision  Short Term Goal 4: Pt will complete LB Dressing with supervision  Patient Goals   Patient goals : to return home       Therapy Time   Individual Concurrent Group Co-treatment   Time In 0708         Time Out 0746         Minutes 38         Timed Code Treatment Minutes: 23 Minutes     This note to serve as OT d/c summary if pt is d/c-ed prior to next therapy session.     Liana Woodruff, OTR/L 160.02

## 2022-07-23 NOTE — PROGRESS NOTES
The proper method of use for this mdi were demonstrated to the pt. The patient verbalizes understanding and returns proper demonstration and technique. Explanation of the medication, and possible side effects were discussed with the pt and they were given the explanation of respiratory medications information sheet. (Follow up education may be required). If the patient is unable to return proper demonstration, the physician will be informed by Respiratory Therapy.   Total time spent educating was 10minutes    Electronically signed by Rich Garrido RCP on 7/23/2022 at 3:44 PM  .

## 2022-07-23 NOTE — PROGRESS NOTES
NAME:  Luciana Sampson OF BIRTH:  1950  MEDICAL RECORD NUMBER:  [id number]  TODAYS DATE:  7/23/2022    Discussed personal risk factors for Stroke /TIA with patient/family, and ways to reduce the risk for a recurrent stroke. Patient's personal risk factors which were identified are:     [] Alcohol Abuse: check with your physician before any alcohol consumption. [x] Atrial fibrillation: may cause blood clots. [] Drug Abuse: Seek help, talk with your doctor  [] Clotting Disorder  [x] Diabetes  [] Family history of stroke or heart disease  [x] High Blood Pressure/Hypertension: work with your physician. [x] High cholesterol: monitor cholesterol levels with your physician. [x] Overweight/Obesity: work with your physician for your ideal body weight. [x] Physical Inactivity: get regular exercise as directed by your physician. [] Personal history of previous TIA or stroke  [x] Poor Diet; decrease salt (sodium) in your diet, follow diet directed by physician. [] Smoking: Cigarette/Cigar: stop smoking. Advised pt. that you can reduce your risk for stroke/TIA by modifying/controlling the risk factors that you have. Pt.advised to take the medications as prescribed, which will be detailed in the discharge instructions, and to not stop taking them without consulting their physician. In addition, pt. advised to maintain a healthy diet, exercise regularly and to not smoke. [facility]'s Stroke treatment and prevention, Managing your recovery  notebook  provided and/or reviewed  with patient/family. The notebook includes, but not limited to, sections addressing warning signs & symptoms of a stroke, which are: sudden numbness or weakness especially on one side of the body, sudden confusion, difficulty speaking or understanding, sudden changes in vision, sudden dizziness or loss of balance/ coordination, or sudden severe headache.   The need to call EMS (911) immediately if signs & symptoms occur is emphasized . The notebook also provides education on Stroke community resources and stroke advocacy. The need for follow-up after discharge was highlighted with patient/family with them being able to repeat understanding of the importance of this.       Electronically signed by Maximilian Hirsch RN on 7/23/2022 at 10:09 AM

## 2022-07-24 LAB — TSH REFLEX FT4: 2.65 UIU/ML (ref 0.27–4.2)

## 2022-07-24 PROCEDURE — 6370000000 HC RX 637 (ALT 250 FOR IP): Performed by: INTERNAL MEDICINE

## 2022-07-24 PROCEDURE — 36415 COLL VENOUS BLD VENIPUNCTURE: CPT

## 2022-07-24 PROCEDURE — 2060000000 HC ICU INTERMEDIATE R&B

## 2022-07-24 PROCEDURE — 94640 AIRWAY INHALATION TREATMENT: CPT

## 2022-07-24 PROCEDURE — 94760 N-INVAS EAR/PLS OXIMETRY 1: CPT

## 2022-07-24 PROCEDURE — 99223 1ST HOSP IP/OBS HIGH 75: CPT | Performed by: INTERNAL MEDICINE

## 2022-07-24 PROCEDURE — 84443 ASSAY THYROID STIM HORMONE: CPT

## 2022-07-24 PROCEDURE — 82607 VITAMIN B-12: CPT

## 2022-07-24 PROCEDURE — 82746 ASSAY OF FOLIC ACID SERUM: CPT

## 2022-07-24 RX ORDER — FLUCONAZOLE 100 MG/1
200 TABLET ORAL DAILY
Status: DISCONTINUED | OUTPATIENT
Start: 2022-07-24 | End: 2022-07-25

## 2022-07-24 RX ORDER — VALSARTAN 80 MG/1
40 TABLET ORAL DAILY
Status: DISCONTINUED | OUTPATIENT
Start: 2022-07-24 | End: 2022-07-29 | Stop reason: HOSPADM

## 2022-07-24 RX ADMIN — FLUCONAZOLE 200 MG: 100 TABLET ORAL at 15:58

## 2022-07-24 RX ADMIN — ALBUTEROL SULFATE 2 PUFF: 90 AEROSOL, METERED RESPIRATORY (INHALATION) at 19:35

## 2022-07-24 RX ADMIN — RIVAROXABAN 20 MG: 20 TABLET, FILM COATED ORAL at 15:58

## 2022-07-24 RX ADMIN — ATORVASTATIN CALCIUM 80 MG: 80 TABLET, FILM COATED ORAL at 20:29

## 2022-07-24 RX ADMIN — OXYBUTYNIN CHLORIDE 5 MG: 5 TABLET ORAL at 20:29

## 2022-07-24 RX ADMIN — OXYBUTYNIN CHLORIDE 5 MG: 5 TABLET ORAL at 08:54

## 2022-07-24 RX ADMIN — VALSARTAN 40 MG: 80 TABLET, FILM COATED ORAL at 12:12

## 2022-07-24 RX ADMIN — PHENAZOPYRIDINE HYDROCHLORIDE 200 MG: 200 TABLET ORAL at 12:12

## 2022-07-24 RX ADMIN — PHENAZOPYRIDINE HYDROCHLORIDE 200 MG: 200 TABLET ORAL at 08:53

## 2022-07-24 RX ADMIN — ASPIRIN 81 MG: 81 TABLET, COATED ORAL at 08:53

## 2022-07-24 RX ADMIN — METOPROLOL SUCCINATE 50 MG: 50 TABLET, EXTENDED RELEASE ORAL at 08:54

## 2022-07-24 RX ADMIN — ALBUTEROL SULFATE 2 PUFF: 90 AEROSOL, METERED RESPIRATORY (INHALATION) at 07:34

## 2022-07-24 NOTE — CONSULTS
Cardiology Consultation   Date: 2022  Admit Date:  2022  Reason for Consultation: echo with low EF  Consult Requesting Physician: Isabella Perez MD     Chief Complaint   Patient presents with    Dizziness     Started yesterday morning, usually walks w/ a walker but now has trouble, pt feels like she is going to fall over. HPI: Pat August is a 73 y.o. with PMHx of paroxysmal atrial fibrillation, HYPERTENSION, HLD who presented to [facility] as a transfer from [address] [place] [address] emergency department for evaluation of TIA. Workup has not shown any stroke (CT/CTA and MRI). EKGs shows chronically persistent atrial fibrillation with the last ECG showing normal sinus rhythm in 2015. All ECGs from 2017 show atrial fibrillation. She's chronically anticoagulated with rivaroxaban. She's also had LBBB from . She has low LVEF for around 30% going back to an echocardiogram 2017. Past Medical History:   Diagnosis Date    Arthritis     Atrial fibrillation ([address]     Depression     Hyperlipidemia     Hypertension         Past Surgical History:   Procedure Laterality Date    CARDIAC CATHETERIZATION       SECTION      HERNIA REPAIR         Allergies   Allergen Reactions    Pcn [Penicillins]        Social History:  Reviewed. reports that she has never smoked. She has never used smokeless tobacco. She reports that she does not drink alcohol and does not use drugs. Family History:  Reviewed. family history includes Diabetes in her maternal grandmother, maternal uncle, and mother; High Blood Pressure in her father; High Cholesterol in her father. No premature CAD. Review of System:  Pertinent positive and negatives are in the HPI, the rest are negative.        Physical Examination:  Vitals:    22 0846   BP: 125/66   Pulse: 85   Resp: 16   Temp: 97.3 °F (36.3 °C)   SpO2: 94%        Intake/Output Summary (Last 24 hours) at 2022 1030  Last data filed at 2022 2233  Gross per 24 hour   Intake 680 ml   Output 1 ml   Net 679 ml     In: 680 [P.O.:680]  Out: 1    Wt Readings from Last 3 Encounters:   22 274 lb 11.1 oz (124.6 kg)   06/15/22 292 lb 15.9 oz (132.9 kg)   19 261 lb 0.4 oz (118.4 kg)     Temp  Av.6 °F (36.4 °C)  Min: 97.3 °F (36.3 °C)  Max: 97.8 °F (36.6 °C)  Pulse  Av.3  Min: 54  Max: 85  BP  Min: 111/77  Max: 140/75  SpO2  Av.9 %  Min: 93 %  Max: 97 %    Telemetry:atrial fibrillation  Constitutional: Alert. Oriented to person, place, and time. No distress. Head: Normocephalic and atraumatic. Mouth/Throat: Lips appear moist. Oropharynx is clear and moist.  Eyes: Conjunctivae normal. EOM are normal.   Neck: Neck supple. No rigidity. no JVD present. Cardiovascular: Irregularly irregular     Pulmonary/Chest: Bilateral respiratory sounds present. No respiratory accessory muscle use. No wheezes, No rhonchi. no rales. Abdominal: Soft. Normal bowel sounds present. No tenderness. Musculoskeletal: No tenderness. trace edema    Lymphadenopathy: Has no obvious cervical adenopathy. Neurological: Alert and oriented. Grossly intact with no obvious focal deficit. Skin: Skin is warm and dry. Psychiatric: Normal mood and affect. Labs:  Reviewed. Recent Labs     22  1454      K 4.5      CO2 20*   BUN 16   CREATININE 0.6     Recent Labs     22  1454 22  0336   WBC 7.3 6.1   HGB 13.3 12.7   HCT 40.4 38.3   MCV 88.9 88.0    206     Lab Results   Component Value Date/Time    TROPONINI <0.01 2022 02:54 PM     No results found for: BNP  No results found for: PROTIME, INR  Lab Results   Component Value Date/Time    CHOL 141 2022 03:36 AM    HDL 55 2022 03:36 AM    HDL 57 2011 11:50 AM    TRIG 96 2022 03:36 AM       Diagnostic and imaging results reviewed.      EC2022: Atrial fibrillation, controlled rates, LBBB  Echo: 2022  Summary   Very TDS with poor visualization of endocardium   Unable to make proper measurements due to poor acoustical window even with   definity administered. Ejection fraction is visually estimated to be 35-40%. A bubble study was performed and fails to show evidence of shunting. Cath: none in the past    I independently reviewed the ECG, telemetry, serology, echocardiographic results. Scheduled Meds:   phenazopyridine  200 mg Oral TID WC    oxybutynin  5 mg Oral BID    albuterol sulfate HFA  2 puff Inhalation BID    metoprolol succinate  50 mg Oral Daily    rivaroxaban  20 mg Oral Dinner    aspirin  81 mg Oral Daily    Or    aspirin  300 mg Rectal Daily    atorvastatin  80 mg Oral Nightly     Continuous Infusions:  PRN Meds:.meclizine, ondansetron **OR** ondansetron, polyethylene glycol, labetalol     Problem List:   Patient Active Problem List    Diagnosis Date Noted    Cardiomyopathy ([facility] [address]     Morbid obesity with BMI of 40.0-44.9, adult ([facility] [address]     Snorings     TIA (transient ischemic attack) 07/22/2022    Atrial fibrillation, rapid ([facility] [address] 01/29/2017    Essential hypertension 01/29/2017    Hyperlipidemia 01/29/2017      Active Hospital Problems    Diagnosis Date Noted    TIA (transient ischemic attack) [G45.9] 07/22/2022     Priority: Medium         Assessment and Recommendation(s):  ? TIA - per primary team.   Long standing persistent atrial fibrillation. LBBB going back to 2017. Cardiomyopathy with moderate to severely reduced LV systolic function - Not new. Goes back to echocardiogram in 8/2017. 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 Troponin negative and does not seem to be in heart failure. She's supposed to have followed up with cardiology for evaluation of cardiomyopathy. Likely non-ischemic with chronic atrial fibrillation and LBBB being a possible etiology but also needs to be on maximally tolerated GDMT.     - continue metoprolol succinate.    - add valsartan 40 mg with eventual plan to be switched to ARNi   - low dose lasix 20 mg daily.    - SGLTi as outpatient. - Ischemic evaluation as outpatient. Has poor acoustic window for good assessment of wall motion and will likely have significant artifact for NM evalution for body habitus. Cor angiogram is the best option for her. 5. Morbid obesity/snorin. BMI 43. Sleep apnea likely and JAMISON is associated with atrial fibrillation.    - outpatient sleep study. Thank you for allowing me to participate in the care of Jinny Liu . If you have any questions/comments, please do not hesitate to contact us.       Jax Christiansen MD  Cardiac Electrophysiology  [facility]

## 2022-07-25 PROBLEM — I48.21 PERMANENT ATRIAL FIBRILLATION (HCC): Status: ACTIVE | Noted: 2022-07-25

## 2022-07-25 LAB
AMPHETAMINE SCREEN, URINE: NORMAL
ANION GAP SERPL CALCULATED.3IONS-SCNC: 13 MMOL/L (ref 3–16)
BACTERIA: ABNORMAL /HPF
BARBITURATE SCREEN URINE: NORMAL
BASOPHILS ABSOLUTE: 0.1 K/UL (ref 0–0.2)
BASOPHILS RELATIVE PERCENT: 0.7 %
BENZODIAZEPINE SCREEN, URINE: NORMAL
BILIRUBIN URINE: NEGATIVE
BLOOD, URINE: NEGATIVE
BUN BLDV-MCNC: 16 MG/DL (ref 7–20)
CALCIUM SERPL-MCNC: 9.5 MG/DL (ref 8.3–10.6)
CANNABINOID SCREEN URINE: NORMAL
CHLORIDE BLD-SCNC: 102 MMOL/L (ref 99–110)
CLARITY: CLEAR
CO2: 20 MMOL/L (ref 21–32)
COCAINE METABOLITE SCREEN URINE: NORMAL
COLOR: ABNORMAL
CREAT SERPL-MCNC: 0.6 MG/DL (ref 0.6–1.2)
EOSINOPHILS ABSOLUTE: 0.2 K/UL (ref 0–0.6)
EOSINOPHILS RELATIVE PERCENT: 3.3 %
EPITHELIAL CELLS, UA: 2 /HPF (ref 0–5)
EPITHELIALS (RENAL), 013149: PRESENT
GFR AFRICAN AMERICAN: >60
GFR NON-AFRICAN AMERICAN: >60
GLUCOSE BLD-MCNC: 87 MG/DL (ref 70–99)
GLUCOSE URINE: NEGATIVE MG/DL
HCT VFR BLD CALC: 40.6 % (ref 36–48)
HEMOGLOBIN: 13.5 G/DL (ref 12–16)
HYALINE CASTS: 0 /LPF (ref 0–8)
KETONES, URINE: NEGATIVE MG/DL
LEUKOCYTE ESTERASE, URINE: ABNORMAL
LYMPHOCYTES ABSOLUTE: 1.9 K/UL (ref 1–5.1)
LYMPHOCYTES RELATIVE PERCENT: 24.5 %
Lab: NORMAL
MCH RBC QN AUTO: 29.1 PG (ref 26–34)
MCHC RBC AUTO-ENTMCNC: 33.3 G/DL (ref 31–36)
MCV RBC AUTO: 87.4 FL (ref 80–100)
METHADONE SCREEN, URINE: NORMAL
MICROSCOPIC EXAMINATION: YES
MONOCYTES ABSOLUTE: 0.8 K/UL (ref 0–1.3)
MONOCYTES RELATIVE PERCENT: 10.2 %
MUCUS: PRESENT
NEUTROPHILS ABSOLUTE: 4.6 K/UL (ref 1.7–7.7)
NEUTROPHILS RELATIVE PERCENT: 61.3 %
NITRITE, URINE: NEGATIVE
OPIATE SCREEN URINE: NORMAL
OXYCODONE URINE: NORMAL
PDW BLD-RTO: 15.3 % (ref 12.4–15.4)
PH UA: 5.5 (ref 5–8)
PH UA: 6
PHENCYCLIDINE SCREEN URINE: NORMAL
PLATELET # BLD: 235 K/UL (ref 135–450)
PMV BLD AUTO: 8 FL (ref 5–10.5)
POTASSIUM SERPL-SCNC: 4.6 MMOL/L (ref 3.5–5.1)
PROPOXYPHENE SCREEN: NORMAL
PROTEIN UA: NEGATIVE MG/DL
RBC # BLD: 4.64 M/UL (ref 4–5.2)
RBC UA: 0 /HPF (ref 0–4)
SODIUM BLD-SCNC: 135 MMOL/L (ref 136–145)
SPECIFIC GRAVITY UA: 1 (ref 1–1.03)
URINE TYPE: ABNORMAL
UROBILINOGEN, URINE: 1 E.U./DL
WBC # BLD: 7.6 K/UL (ref 4–11)
WBC UA: 4 /HPF (ref 0–5)

## 2022-07-25 PROCEDURE — 94640 AIRWAY INHALATION TREATMENT: CPT

## 2022-07-25 PROCEDURE — 80307 DRUG TEST PRSMV CHEM ANLYZR: CPT

## 2022-07-25 PROCEDURE — 6370000000 HC RX 637 (ALT 250 FOR IP): Performed by: INTERNAL MEDICINE

## 2022-07-25 PROCEDURE — 2060000000 HC ICU INTERMEDIATE R&B

## 2022-07-25 PROCEDURE — 81001 URINALYSIS AUTO W/SCOPE: CPT

## 2022-07-25 PROCEDURE — 92526 ORAL FUNCTION THERAPY: CPT

## 2022-07-25 PROCEDURE — 36415 COLL VENOUS BLD VENIPUNCTURE: CPT

## 2022-07-25 PROCEDURE — 99232 SBSQ HOSP IP/OBS MODERATE 35: CPT | Performed by: NURSE PRACTITIONER

## 2022-07-25 PROCEDURE — 94760 N-INVAS EAR/PLS OXIMETRY 1: CPT

## 2022-07-25 PROCEDURE — 85025 COMPLETE CBC W/AUTO DIFF WBC: CPT

## 2022-07-25 PROCEDURE — 80048 BASIC METABOLIC PNL TOTAL CA: CPT

## 2022-07-25 RX ORDER — METOPROLOL SUCCINATE 50 MG/1
25 TABLET, EXTENDED RELEASE ORAL DAILY
Qty: 30 TABLET | Refills: 3 | Status: SHIPPED | OUTPATIENT
Start: 2022-07-25 | End: 2022-10-24 | Stop reason: SDUPTHER

## 2022-07-25 RX ORDER — FLUCONAZOLE 200 MG/1
200 TABLET ORAL DAILY
Qty: 1 TABLET | Refills: 0 | Status: SHIPPED | OUTPATIENT
Start: 2022-07-26 | End: 2022-07-29 | Stop reason: HOSPADM

## 2022-07-25 RX ORDER — CALCIUM CARBONATE 200(500)MG
500 TABLET,CHEWABLE ORAL 3 TIMES DAILY PRN
Status: DISCONTINUED | OUTPATIENT
Start: 2022-07-25 | End: 2022-07-29 | Stop reason: HOSPADM

## 2022-07-25 RX ORDER — HYDROCODONE BITARTRATE AND ACETAMINOPHEN 5; 325 MG/1; MG/1
1 TABLET ORAL EVERY 8 HOURS
Status: DISCONTINUED | OUTPATIENT
Start: 2022-07-25 | End: 2022-07-29 | Stop reason: HOSPADM

## 2022-07-25 RX ORDER — VALSARTAN 40 MG/1
40 TABLET ORAL DAILY
Qty: 30 TABLET | Refills: 3 | Status: SHIPPED | OUTPATIENT
Start: 2022-07-26 | End: 2022-08-15

## 2022-07-25 RX ADMIN — ALBUTEROL SULFATE 2 PUFF: 90 AEROSOL, METERED RESPIRATORY (INHALATION) at 19:48

## 2022-07-25 RX ADMIN — ATORVASTATIN CALCIUM 80 MG: 80 TABLET, FILM COATED ORAL at 21:32

## 2022-07-25 RX ADMIN — FLUCONAZOLE 200 MG: 100 TABLET ORAL at 09:51

## 2022-07-25 RX ADMIN — OXYBUTYNIN CHLORIDE 5 MG: 5 TABLET ORAL at 09:51

## 2022-07-25 RX ADMIN — ASPIRIN 81 MG: 81 TABLET, COATED ORAL at 09:51

## 2022-07-25 RX ADMIN — RIVAROXABAN 20 MG: 20 TABLET, FILM COATED ORAL at 17:53

## 2022-07-25 RX ADMIN — VALSARTAN 40 MG: 80 TABLET, FILM COATED ORAL at 09:51

## 2022-07-25 RX ADMIN — ALBUTEROL SULFATE 2 PUFF: 90 AEROSOL, METERED RESPIRATORY (INHALATION) at 07:49

## 2022-07-25 RX ADMIN — METOPROLOL SUCCINATE 50 MG: 50 TABLET, EXTENDED RELEASE ORAL at 11:38

## 2022-07-25 RX ADMIN — HYDROCODONE BITARTRATE AND ACETAMINOPHEN 1 TABLET: 5; 325 TABLET ORAL at 17:53

## 2022-07-25 ASSESSMENT — PAIN - FUNCTIONAL ASSESSMENT: PAIN_FUNCTIONAL_ASSESSMENT: ACTIVITIES ARE NOT PREVENTED

## 2022-07-25 ASSESSMENT — PAIN DESCRIPTION - ORIENTATION: ORIENTATION: LOWER

## 2022-07-25 ASSESSMENT — PAIN DESCRIPTION - LOCATION: LOCATION: ABDOMEN

## 2022-07-25 ASSESSMENT — PAIN DESCRIPTION - DESCRIPTORS: DESCRIPTORS: ACHING

## 2022-07-25 ASSESSMENT — PAIN SCALES - GENERAL
PAINLEVEL_OUTOF10: 0
PAINLEVEL_OUTOF10: 4

## 2022-07-25 NOTE — PLAN OF CARE
Problem: Discharge Planning  Goal: Discharge to home or other facility with appropriate resources  Outcome: Progressing       Problem: Safety - Adult  Goal: Free from fall injury  Outcome: Progressing     Problem: ABCDS Injury Assessment  Goal: Absence of physical injury  Outcome: Progressing

## 2022-07-25 NOTE — PROGRESS NOTES
Patient got back to chair from bathroom. Patient then asked nurse to \"put the little girl into bed because she's falling asleep\" while pointing to her rolling walker. Nurse tried to reorient patient, stating that there was no one sitting on her rolling walker. Patient became upset.

## 2022-07-25 NOTE — PROGRESS NOTES
Patient called RN into the room. She is very upset because she states the police did not search the correct area. RN went down to the parking lot where the patient indicated she saw the boy being attacked by feral animals (\"that cat just tore into him\")  RN waved to patient from window. There was no sign of a boy being attacked or having been attacked by feral animals. RN explained this to patient upon return to the unit. Patient rejected RNs findings and stated \"I'm not stupid. \"  RN explained to patient that no one thinks she's stupid but that she has had several hallucinations overnight. Patient stated \"No I haven't\"  RN reminded patient that she thought she was in a hotel room earlier and thought there were kids standing behind her. \"  Patient stated \"That was yesterday. \"  RN told patient it was just a few hours ago. Patient rejected reorientation. Will continue to monitor.

## 2022-07-25 NOTE — PROGRESS NOTES
Patient with complaints of dizziness and vertigo. She voiced her concern that the vertigo was caused by the diflucan she was given. Explained to patient that the vertigo isn't really a side effect of the diflucan but is the original complaint that she presented with to the ED. And that it did follow a positional change (patient stood and walked to bathroom. BP up returning to seat is 129/110 (sitting in chair). Waited a couple of minutes and got another BP and it was 99/70 (sitting in chair).

## 2022-07-25 NOTE — CARE COORDINATION
[facility]    Referral received from  to follow for home care services. [address] unable to staff timely, will require alternate agency, [facility] [place], [facility]. is second choice. Jean Long at [facility] [place] [facility]. accepted referral, will pull from [facility].         Lucho Aguilera RN, BSN CTN  [facility] [phone]

## 2022-07-25 NOTE — PROGRESS NOTES
Hospitalist Progress Note      PCP: Eliana Guzman MD    Date of Admission: 7/22/2022    Subjective: denies any complaints    Medications:  Reviewed    Infusion Medications   Scheduled Medications    valsartan  40 mg Oral Daily    fluconazole  200 mg Oral Daily    oxybutynin  5 mg Oral BID    albuterol sulfate HFA  2 puff Inhalation BID    metoprolol succinate  50 mg Oral Daily    rivaroxaban  20 mg Oral Dinner    aspirin  81 mg Oral Daily    Or    aspirin  300 mg Rectal Daily    atorvastatin  80 mg Oral Nightly     PRN Meds: meclizine, ondansetron **OR** ondansetron, polyethylene glycol, labetalol    No intake or output data in the 24 hours ending 07/25/22 0046    Physical Exam Performed:    /77   Pulse 88   Temp 97.5 °F (36.4 °C) (Oral)   Resp 20   Wt 274 lb 11.1 oz (124.6 kg)   SpO2 95%   BMI 43.02 kg/m²        General appearance:  Well developed, well nourished, older  female lying on hospital bed in no apparent distress, appears stated age and cooperative. HEENT:  Normal cephalic, atraumatic without obvious deformity. Pupils equal, round, and reactive to light. Conjunctivae/corneas clear. Neck: Supple, with full range of motion. No jugular venous distention. Trachea midline. Respiratory:  Normal respiratory effort. Clear to auscultation, bilaterally without accessory muscle use. Cardiovascular:  Regular rate and rhythm without murmurs no lower extremity edema. Abdomen: Soft, obese abdomen, non-tender, non-distended, without rebound or guarding. Normal bowel sounds. Musculoskeletal:  Moves all extremities equally. Full range of motion without deformity. Skin: Skin warm, dry and intact. No rashes or lesions. Neurologic:  Neurovascularly intact without any focal sensory/motor deficits.  Cranial nerves: II-XII intact, grossly non-focal.  Psychiatric:  Alert and oriented, thought content appropriate, normal insight  Capillary Refill: Brisk,< 3 seconds  Peripheral Pulses: +2 palpable, equal bilaterally       Labs:   Recent Labs     07/22/22  1454 07/23/22  0336   WBC 7.3 6.1   HGB 13.3 12.7   HCT 40.4 38.3    206     Recent Labs     07/22/22  1454      K 4.5      CO2 20*   BUN 16   CREATININE 0.6   CALCIUM 8.8     Recent Labs     07/22/22  1454   AST 14*   ALT <5*   BILITOT 1.6*   ALKPHOS 123     No results for input(s): INR in the last 72 hours. Recent Labs     07/22/22  1454   TROPONINI <0.01       Urinalysis:      Lab Results   Component Value Date/Time    NITRU Negative 07/22/2022 04:15 PM    WBCUA 3-5 07/22/2022 04:15 PM    BACTERIA 2+ 07/22/2022 04:15 PM    RBCUA 3-4 07/22/2022 04:15 PM    BLOODU SMALL 07/22/2022 04:15 PM    SPECGRAV 1.010 07/22/2022 04:15 PM    GLUCOSEU Negative 07/22/2022 04:15 PM       Radiology:  MRI brain without contrast   Final Result   1. No acute intracranial abnormality. No acute infarct. 2. Mild-to-moderate global parenchymal volume loss with chronic microvascular   ischemic changes. CTA HEAD NECK W CONTRAST   Final Result   No CT evidence of an acute infarct. No flow limiting stenosis or large vessel occlusion detected within the head   or neck. CT HEAD WO CONTRAST   Final Result   No CT evidence of an acute infarct. No flow limiting stenosis or large vessel occlusion detected within the head   or neck. XR CHEST PORTABLE   Final Result   No acute process.       Stable cardiomegaly                 Assessment/Plan:    Active Hospital Problems    Diagnosis     TIA (transient ischemic attack) [G45.9]      Priority: Medium              Possible TIA/CVA  - with symptoms: dizziness/positional vertigo  - admit to OBS to RO TIA/CVA  - out of the tPA window  - head CT neg for acute pathology  - CTA head/neck: No flow limiting stenosis or large vessel occlusion  - MRI neg for acute infarct  - await echo  - started on ASA, statin  - check lipids, HBA1c  - try meclizine    Cardiomyopathy - EF 35-40% on echo and cardiology consulted for recs. Started on valsartan/lasix     Atrial Fibrillation  - currently rate controlled  - continue Xarelto and metoprolol      Essential (primary) hypertension  - monitor blood pressure  - continue home meds     Hyperlipidemia  - continue statin        DVT Prophylaxis: Xarelto  Diet: ADULT DIET;  Regular  Code Status: Full Code    PT/OT Eval Status: ordered    Valdez Lind MD

## 2022-07-25 NOTE — CONSULTS
Neurology Consult Note  Reason for Consult: hallucinations    Chief complaint: seeing things not there    Dr Trenna Hammans, MD asked me to see Lefty Louis in consultation for evaluation of hallucinations    History of Present Illness:  Lefty Louis is a 73 y.o. female who presented w/ dizziness. I obtained my information via interview w/ the patient, supplemented by chart review. The patient first arrived to the ED on  for her head spinning. She says that started last Tuesday and was occurring off and on, worse w/ movement. She felt sick to her stomach sometimes. This apparently went on for a couple of days. I think she is getting her days mixed up. Regardless, neurologic workup has been unrevealing. She started having visual hallucinations last night. She says she thought her sister was laying on the couch at one point. She thinks she is at home. She says her sister brought in her [place] limon but she thought it was a raccoon instead. She thought her niece was in her room. Earlier this morning she called 911 because she says an animal was eating a person across the street. She says the person was able to get up and when he did was attacked again from the back by another animal.  It is not clear that she is aware that she is hallucinating until after being told so. Currently she is resting in bed w/out any specific complaints. She says she has never hallucinated before. No psychiatric history.       Medical History:  Past Medical History:   Diagnosis Date    Arthritis     Atrial fibrillation ([address]     Depression     Hyperlipidemia     Hypertension      Past Surgical History:   Procedure Laterality Date    CARDIAC CATHETERIZATION       SECTION      HERNIA REPAIR       Scheduled Meds:   valsartan  40 mg Oral Daily    albuterol sulfate HFA  2 puff Inhalation BID    metoprolol succinate  50 mg Oral Daily    rivaroxaban  20 mg Oral Dinner    aspirin  81 mg Oral Daily    Or    aspirin  300 mg Rectal Daily    atorvastatin  80 mg Oral Nightly     Medications Prior to Admission:   diclofenac sodium (VOLTAREN) 1 % GEL, Apply topically 4 times daily as needed for Pain  ondansetron (ZOFRAN ODT) 4 MG disintegrating tablet, Take 1 tablet by mouth every 8 hours as needed for Nausea  albuterol sulfate HFA (VENTOLIN HFA) 108 (90 Base) MCG/ACT inhaler, Inhale 2 puffs into the lungs 4 times daily for 5 days  rivaroxaban (XARELTO) 20 MG TABS tablet, Take 1 tablet by mouth daily  metoprolol succinate (TOPROL XL) 50 MG extended release tablet, Take 1 tablet by mouth daily  furosemide (LASIX) 20 MG tablet, Take 1 tablet by mouth daily  simvastatin (ZOCOR) 20 MG tablet, ONE PILL AT BEDTIME  tiZANidine (ZANAFLEX) 4 MG tablet, One pill every eight hours as needed  HYDROcodone-acetaminophen (NORCO) 7.5-325 MG per tablet, One pill every six hours PRN  lisinopril (PRINIVIL;ZESTRIL) 5 MG tablet, One pill every day    Allergies   Allergen Reactions    Pcn [Penicillins]      Family History   Problem Relation Age of Onset    Diabetes Mother     High Cholesterol Father     High Blood Pressure Father     Diabetes Maternal Uncle     Diabetes Maternal Grandmother      Social History     Tobacco Use   Smoking Status Never   Smokeless Tobacco Never     Social History     Substance and Sexual Activity   Drug Use No     Social History     Substance and Sexual Activity   Alcohol Use No     ROS  Constitutional- No weight loss or fevers  Eyes- No diplopia. No photophobia. Ears/nose/throat- No dysphagia. No Dysarthria  Cardiovascular- No palpitations. No chest pain  Respiratory- No dyspnea. No Cough  Gastrointestinal- No Abdominal pain. No Vomiting. Genitourinary- No incontinence. No urinary retention  Musculoskeletal- No myalgia. No arthralgia  Skin- No rash. No easy bruising. Psychiatric- No depression. No anxiety  Endocrine- No diabetes. No thyroid issues. Hematologic- No bleeding difficulty.  No fatigue  Neurologic- No weakness. No Headache. Exam  Blood pressure (!) 122/96, pulse 89, temperature 97.6 °F (36.4 °C), temperature source Oral, resp. rate 18, height 5' 7\" (1.702 m), weight 274 lb 14.6 oz (124.7 kg), SpO2 94 %. Constitutional    Vital signs: BP, HR, and RR reviewed   General alert, no distress, well-nourished  Eyes: unable to visualize the fundi  Cardiovascular: no peripheral edema. Psychiatric: cooperative with examination, no psychotic behavior noted. Neurologic  Mental status:   orientation to person, says she is a home, time, situation. General fund of knowledge grossly intact   Memory grossly intact   Attention intact as able to attend well to the exam     Language fluent in conversation   Comprehension intact; follows simple commands  Cranial nerves:   CN2: visual fields full   CN 3,4,6: extraocular muscles intact. Pupils are equal, round reactive bilaterally. CN5: facial sensation symmetric   CN7: face symmetric without dysarthria  CN8: hearing grossly intact  CN9: palate elevated symmetrically  CN11: trap full strength on shoulder shrug  CN12: tongue midline with protrusion  Strength: 5/5 BUE, 4/5 BLE. ROM in LEs is limited somewhat to knee pain. Sensory: light touch intact in all 4 extremities. Cerebellar/coordination: finger nose finger normal without ataxia  Tone: normal in all 4 extremities  Gait: deferred at this time for safety. Labs  Glucose 87  Na 135  K 4.6  BUN 16  Cr 0.6    TSH Reflex FT4 - 2.65    WBC 7.6K  Hg 13.5  Platelets 945    LDL 67  HgA1c 5.7    COVID negative (+ 6/15/22)  UA small LE, 2+ bacteria, + yeast    Studies  MRI brain w/o 7/23/22, independently reviewed  Impression   1. No acute intracranial abnormality. No acute infarct. 2. Mild-to-moderate global parenchymal volume loss with chronic microvascular   ischemic changes.      CTA head/neck 7/22/22, independently reviewed  Impression   No flow limiting stenosis or large vessel occlusion detected within the head   or neck. Impression  Visual hallucinations. Could be part of delirium. No acute findings on brain MRI though does have atrophy, perhaps more noticeably in the frontal lobes. Vertigo. Neurologic workup negative. Atrial fibrillation. On anticoagulation. Hypertension. Recommendations  Hallucinations are unlikely epileptic though will check EEG. Add ammonia, B12, drug screen. Could consider Psychiatry evaluation.       Selwyn Lezama NP  [address] [place] [address] [address] Neurology    A copy of this note was provided for Dr Geneva Merida MD

## 2022-07-25 NOTE — PROGRESS NOTES
Cardiology - PROGRESS NOTE    Admit Date: 7/22/2022     Reason for follow up: SHANICE     73 y.o. with PMHx of paroxysmal atrial fibrillation, HYPERTENSION, HLD who presented to [address] [place] [address],[address] as a transfer from [address] [place] [address] emergency department for evaluation of TIA. Workup has not shown any stroke (CT/CTA and MRI). EKGs shows chronically persistent atrial fibrillation with the last ECG showing normal sinus rhythm in 8/30/2015. All ECGs from 1/2017 show atrial fibrillation. She's chronically anticoagulated with rivaroxaban. She's also had LBBB from 2017. She has low LVEF for around 30% going back to an echocardiogram 2/2017. Social History:   reports that she has never smoked. She has never used smokeless tobacco. She reports that she does not drink alcohol and does not use drugs. Family History: family history includes Diabetes in her maternal grandmother, maternal uncle, and mother; High Blood Pressure in her father; High Cholesterol in her father. Interval History:   Patient seen and examined and notes reviewed   Sitting in chair  Patient reports there is a child that needs to be put back to bed   No CV complaints   No chest pain, lightheadedness or dizziness     All other systems reviewed and negative except as above. Diet: ADULT DIET;  Regular  Pain is:None  Nausea:None    In: 300 [P.O.:300]  Out: -    Wt Readings from Last 3 Encounters:   07/25/22 274 lb 14.6 oz (124.7 kg)   06/15/22 292 lb 15.9 oz (132.9 kg)   12/22/19 261 lb 0.4 oz (118.4 kg)           Data:   Scheduled Meds:   Scheduled Meds:   valsartan  40 mg Oral Daily    fluconazole  200 mg Oral Daily    albuterol sulfate HFA  2 puff Inhalation BID    metoprolol succinate  50 mg Oral Daily    rivaroxaban  20 mg Oral Dinner    aspirin  81 mg Oral Daily    Or    aspirin  300 mg Rectal Daily    atorvastatin  80 mg Oral Nightly     Continuous Infusions:  PRN Meds:.meclizine, ondansetron **OR** ondansetron, polyethylene glycol, labetalol  Continuous Infusions:    Intake/Output Summary (Last 24 hours) at 2022 1144  Last data filed at 2022 0957  Gross per 24 hour   Intake 300 ml   Output --   Net 300 ml       CBC:   Recent Labs     22  0336   WBC 6.1   HGB 12.7        BMP:  Recent Labs     22  1454      K 4.5      CO2 20*   BUN 16   CREATININE 0.6   GLUCOSE 99     ABGs: No results found for: PHART, PO2ART, XWA1LJT  INR: No results for input(s): INR in the last 72 hours. CARDIAC LABS     ENZYMES:  Recent Labs     22  1454   TROPONINI <0.01     FASTING LIPID PANEL:  Lab Results   Component Value Date/Time    HDL 55 2022 03:36 AM    HDL 57 2011 11:50 AM    LDLCALC 67 2022 03:36 AM    TRIG 96 2022 03:36 AM     LIVER PROFILE:  Recent Labs     22  1454   AST 14*   ALT <5*       -----------------------------------------------------------------  Telemetry: personally reviewed     EC2022: Atrial fibrillation, controlled rates, LBBB  Echo: 2022  Summary   Very TDS with poor visualization of endocardium   Unable to make proper measurements due to poor acoustical window even with   definity administered. Ejection fraction is visually estimated to be 35-40%. A bubble study was performed and fails to show evidence of shunting.    2017  Summary   Patient in atrial fibrillation. Poor image quality. Definity contrast used. Overall left ventricular systolic function appears severely reduced. Ejection fraction is difficult to estimate secondary to poor image quality   and tachycardia but seems < 30% with diffuse hypokinesis. Normal left   ventricular wall thickness and cavity size. The right ventricle is not well visualized but appears normal in size. The left atrium appears moderately dilated. Moderate mitral annular calcification. Trivial mitral and tricuspid regurgitation. CTA:  7/22/22  No CT evidence of an acute infarct. No flow limiting stenosis or large vessel occlusion detected within the head   or neck. Cath: none in the past      Objective:   Vitals: BP (!) 122/96   Pulse 89   Temp 97.6 °F (36.4 °C) (Oral)   Resp 18   Ht 5' 7\" (1.702 m)   Wt 274 lb 14.6 oz (124.7 kg)   SpO2 94%   BMI 43.06 kg/m²   General appearance: awake, hallucinating,   Skin: Skin color, texture, turgor normal. No rashes or ecchymosis. HEENT: Head: Normal, normocephalic, atraumatic. Neck: no carotid bruit, no JVD, supple, symmetrical, trachea midline, and thyroid not enlarged, symmetric, no tenderness/mass/nodules  Lungs: clear to auscultation bilaterally, no accessory muscle use, no respiratory distress   Heart: irregularly irregular rhythm and S1, S2 normal  Abdomen: soft, non-tender; bowel sounds normal; no masses,  no organomegaly  Extremities: extremities normal, atraumatic, no cyanosis or edema, pulses: DP +2/+2, PT +2/+2  Neurologic: Mental status: Alert, oriented, thought content appropriate, no tremors, no gross sensory motor deficit,   Psychiatric: normal insight and affect      Assessment & Plan:    Patient Active Problem List:        Plan:  1. Possible TIA   Per primary/neuro   Pt with hx of AF     On anticoagulation therapy    No evidence of shunt on bubble study   2. Morbid obesity   Likely JAMISON   Recommend outpt sleep study  3. Cardiomyopathy   EF 35-40%   Unchanged from at least 2017   Optimize medical therapy     On beta-blocker, ARB, lasix     Consideration for SGLTi as outpt   4. Atrial fibrillation    Long standing persistent   Rate controlled on beta-blocker therapy    Continue Xarelto   5.  Hallucinations   Defer to primary              KEELEY Mitchell-CNP   [address]  Cardiology   7/25/2022  11:44 AM

## 2022-07-25 NOTE — CARE COORDINATION
INITIAL CASE MANAGEMENT ASSESSMENT    Met with patient to assess possible discharge needs. Explained Case Management role/services. 07/25/22 5261   Service Assessment   Patient Orientation Alert and Oriented;Person;Place  (confusion noted at times)   Cognition Alert  (hallucinations)   History Provided By Patient   Primary [address] Members   Patient's Healthcare Decision Maker is: Legal Next Bill Sung   PCP Verified by CM Yes   Last Visit to PCP Within last 6 months   Prior Functional Level Independent in ADLs/IADLs   Can patient return to prior living arrangement Yes   Ability to make needs known: Good   Family able to assist with home care needs: Yes   Would you like for me to discuss the discharge plan with any other family members/significant others, and if so, who? No   Social/Functional History   Lives With Alone   Type of Home Apartment   Home Layout One level   Home Access Stairs to enter with rails   Entrance Stairs - Number of Steps 3   Entrance Stairs - Rails Both   Bathroom Shower/Tub Tub/Shower unit   [employer] bars in shower; [address], 4 wheeled   [place] Help From Other (comment)  ([facility])   ADL Assistance Independent   Ambulation Assistance Needs assistance   Transfer Assistance Needs assistance   Active  No  (family transport/public transport)   Mode of Transportation Car;Bus   Occupation Retired   Discharge Planning   Type of [address] Prior To Admission Private Duty Homecare;Meals On Wheels   Potential Assistance Needed Outpatient PT/OT   DME Ordered? No   Potential Assistance Purchasing Medications No   Type of Home Care Services PT;Meals on Wheels;Housekeeping   Patient expects to be discharged to: Apartment     PT/OT Recs: PT at home 2-3 sessions per week    PLAN/COMMENTS: Patient plans to return home alone. Sister is able to help patient.   Patient currently with some confusion and hallucinations. Provided patient a list of home care agencies, patient did not have a choice and wanted CM to speak with her sister. Spoke with Tara Shahid regarding home PT, would like referrals sent to [facility] and [facility]. The Plan for Transition of Care is related to the following treatment goals: strengthening. The Patient and/or patient representative sister Marilu Hernandez was provided with a choice of provider and agrees with the discharge plan. [x] Yes [] No (refused needing a list, requested the above referrals). Freedom of choice list was provided with basic dialogue that supports the patient's individualized plan of care/goals, treatment preferences and shares the quality data associated with the providers. [x] Yes [] No    Patient with current  services with [employer] and Meals on Wheels through [facility] on Aging. Provided contact information for patient or family to call with any questions. Will follow and assist as needed.     #[phone]  Electronically signed by Leonor Puente RN on 7/25/2022 at 2:20 PM

## 2022-07-25 NOTE — ACP (ADVANCE CARE PLANNING)
Advance Care Planning     Advance Care Planning Activator (Inpatient)  Conversation Note      Date of ACP Conversation: 7/25/2022     Conversation Conducted with: Patient with Decision Making Capacity    ACP Activator: Lamar Blum RN    Health Care Decision Maker:     Current Designated Health Care Decision Maker:     Primary Decision Maker: Raymundo Lim - Brother/Sister - [phone]    Care Preferences    Ventilation: \"If you were in your present state of health and suddenly became very ill and were unable to breathe on your own, what would your preference be about the use of a ventilator (breathing machine) if it were available to you? \"      Would the patient desire the use of ventilator (breathing machine)?: no    \"If your health worsens and it becomes clear that your chance of recovery is unlikely, what would your preference be about the use of a ventilator (breathing machine) if it were available to you? \"     Would the patient desire the use of ventilator (breathing machine)?: No      Resuscitation  \"CPR works best to restart the heart when there is a sudden event, like a heart attack, in someone who is otherwise healthy. Unfortunately, CPR does not typically restart the heart for people who have serious health conditions or who are very sick. \"    \"In the event your heart stopped as a result of an underlying serious health condition, would you want attempts to be made to restart your heart (answer \"yes\" for attempt to resuscitate) or would you prefer a natural death (answer \"no\" for do not attempt to resuscitate)? \" yes       [] Yes   [] No   Educated Patient / Elfredia Latin regarding differences between Advance Directives and portable DNR orders.     Length of ACP Conversation in minutes:      Conversation Outcomes:  [x] ACP discussion completed  [] Existing advance directive reviewed with patient; no changes to patient's previously recorded wishes  [] New Advance Directive completed  [] Portable Do Not Rescitate prepared for Provider review and signature  [] POLST/POST/MOLST/MOST prepared for Provider review and signature      Follow-up plan:    [] Schedule follow-up conversation to continue planning  [] Referred individual to Provider for additional questions/concerns   [] Advised patient/agent/surrogate to review completed ACP document and update if needed with changes in condition, patient preferences or care setting  [] This note routed to one or more involved healthcare providers    #[phone]  Electronically signed by Surya Feliciano RN on 7/25/2022 at 2:12 PM

## 2022-07-25 NOTE — PROGRESS NOTES
Protective Services came to the floor to let staff know that patient had called the police to let them know that a child had been attacked by raccoons in the parking lot. Protective services checked on patient. Alberto Carter and protective services did a search of parking lot but did not find a little boy being attacked by feral animals. Patient has had hallucinations on several occasions overnight but she is becoming more difficult to reorient. Will continue to monitor.

## 2022-07-25 NOTE — PROGRESS NOTES
Patient starting to have hallucinations. Asked RN about \"the kids standing behind me\". RN was able to reorient patient fairly easily. Will continue to monitor.

## 2022-07-25 NOTE — PROGRESS NOTES
grossly non-focal.  Psychiatric:  Alert and oriented, but with visual hallunaction  Capillary Refill: Brisk,< 3 seconds  Peripheral Pulses: +2 palpable, equal bilaterally       Labs:   Recent Labs     07/23/22  0336 07/25/22  1203   WBC 6.1 7.6   HGB 12.7 13.5   HCT 38.3 40.6    235     Recent Labs     07/25/22  1203   *   K 4.6      CO2 20*   BUN 16   CREATININE 0.6   CALCIUM 9.5     No results for input(s): AST, ALT, BILIDIR, BILITOT, ALKPHOS in the last 72 hours. No results for input(s): INR in the last 72 hours. No results for input(s): Skylar Kras in the last 72 hours. Urinalysis:      Lab Results   Component Value Date/Time    NITRU Negative 07/25/2022 01:26 PM    WBCUA 4 07/25/2022 01:26 PM    BACTERIA None Seen 07/25/2022 01:26 PM    RBCUA 0 07/25/2022 01:26 PM    BLOODU Negative 07/25/2022 01:26 PM    SPECGRAV 1.005 07/25/2022 01:26 PM    GLUCOSEU Negative 07/25/2022 01:26 PM       Radiology:  MRI brain without contrast   Final Result   1. No acute intracranial abnormality. No acute infarct. 2. Mild-to-moderate global parenchymal volume loss with chronic microvascular   ischemic changes. CTA HEAD NECK W CONTRAST   Final Result   No CT evidence of an acute infarct. No flow limiting stenosis or large vessel occlusion detected within the head   or neck. CT HEAD WO CONTRAST   Final Result   No CT evidence of an acute infarct. No flow limiting stenosis or large vessel occlusion detected within the head   or neck. XR CHEST PORTABLE   Final Result   No acute process.       Stable cardiomegaly         CT ABDOMEN PELVIS W IV CONTRAST Additional Contrast? None    (Results Pending)           Assessment/Plan:    Active Hospital Problems    Diagnosis     Permanent atrial fibrillation (HCC) [I48.21]      Priority: Medium    TIA (transient ischemic attack) [G45.9]      Priority: Medium       Possible TIA/CVA  - with symptoms: dizziness/positional vertigo  - admit to OBS to RO TIA/CVA  - out of the tPA window  - head CT neg for acute pathology  - CTA head/neck: No flow limiting stenosis or large vessel occlusion  - MRI neg for acute infarct  - await echo  - started on ASA, statin  - check lipids, HBA1c  - try meclizine     Cardiomyopathy - EF 35-40% on echo and cardiology consulted for recs. Started on valsartan/lasix    Acute metabolic encephalopathy with visual hallucination - will re-consult neurology. Check urine drug screen. ?withdrawal from home meds. Restart norco 5/325 for now. Check TSH/vit B12, ? ETOH history     Atrial Fibrillation  - currently rate controlled  - continue Xarelto and metoprolol      Essential (primary) hypertension  - monitor blood pressure  - continue home meds     Hyperlipidemia  - continue statin        DVT Prophylaxis: Xarelto  Diet: ADULT DIET;  Regular  Code Status: Full Code    PT/OT Eval Status: ordered    Vibha Aviles MD

## 2022-07-26 ENCOUNTER — APPOINTMENT (OUTPATIENT)
Dept: CT IMAGING | Age: 72
DRG: 067 | End: 2022-07-26
Payer: MEDICARE

## 2022-07-26 LAB
FOLATE: 5.92 NG/ML (ref 4.78–24.2)
VITAMIN B-12: 278 PG/ML (ref 211–911)

## 2022-07-26 PROCEDURE — 6370000000 HC RX 637 (ALT 250 FOR IP): Performed by: INTERNAL MEDICINE

## 2022-07-26 PROCEDURE — 99231 SBSQ HOSP IP/OBS SF/LOW 25: CPT | Performed by: NURSE PRACTITIONER

## 2022-07-26 PROCEDURE — 74177 CT ABD & PELVIS W/CONTRAST: CPT

## 2022-07-26 PROCEDURE — 9990000010 HC NO CHARGE VISIT: Performed by: PHYSICAL THERAPIST

## 2022-07-26 PROCEDURE — 2060000000 HC ICU INTERMEDIATE R&B

## 2022-07-26 PROCEDURE — 95819 EEG AWAKE AND ASLEEP: CPT

## 2022-07-26 PROCEDURE — 94640 AIRWAY INHALATION TREATMENT: CPT

## 2022-07-26 PROCEDURE — 6370000000 HC RX 637 (ALT 250 FOR IP): Performed by: NURSE PRACTITIONER

## 2022-07-26 PROCEDURE — 94760 N-INVAS EAR/PLS OXIMETRY 1: CPT

## 2022-07-26 PROCEDURE — 6360000004 HC RX CONTRAST MEDICATION: Performed by: INTERNAL MEDICINE

## 2022-07-26 RX ORDER — LORAZEPAM 2 MG/ML
1 CONCENTRATE ORAL ONCE
Status: COMPLETED | OUTPATIENT
Start: 2022-07-26 | End: 2022-07-26

## 2022-07-26 RX ADMIN — HYDROCODONE BITARTRATE AND ACETAMINOPHEN 1 TABLET: 5; 325 TABLET ORAL at 18:10

## 2022-07-26 RX ADMIN — VALSARTAN 40 MG: 80 TABLET, FILM COATED ORAL at 09:19

## 2022-07-26 RX ADMIN — HYDROCODONE BITARTRATE AND ACETAMINOPHEN 1 TABLET: 5; 325 TABLET ORAL at 10:03

## 2022-07-26 RX ADMIN — IOPAMIDOL 75 ML: 755 INJECTION, SOLUTION INTRAVENOUS at 08:58

## 2022-07-26 RX ADMIN — ASPIRIN 81 MG: 81 TABLET, COATED ORAL at 09:19

## 2022-07-26 RX ADMIN — ALBUTEROL SULFATE 2 PUFF: 90 AEROSOL, METERED RESPIRATORY (INHALATION) at 20:22

## 2022-07-26 RX ADMIN — Medication 1 MG: at 00:16

## 2022-07-26 RX ADMIN — ATORVASTATIN CALCIUM 80 MG: 80 TABLET, FILM COATED ORAL at 20:48

## 2022-07-26 RX ADMIN — ALBUTEROL SULFATE 2 PUFF: 90 AEROSOL, METERED RESPIRATORY (INHALATION) at 08:48

## 2022-07-26 RX ADMIN — METOPROLOL SUCCINATE 50 MG: 50 TABLET, EXTENDED RELEASE ORAL at 09:19

## 2022-07-26 RX ADMIN — ANTACID TABLETS 500 MG: 500 TABLET, CHEWABLE ORAL at 18:13

## 2022-07-26 RX ADMIN — HYDROCODONE BITARTRATE AND ACETAMINOPHEN 1 TABLET: 5; 325 TABLET ORAL at 02:29

## 2022-07-26 ASSESSMENT — PAIN DESCRIPTION - LOCATION: LOCATION: BACK

## 2022-07-26 ASSESSMENT — PAIN SCALES - GENERAL
PAINLEVEL_OUTOF10: 6
PAINLEVEL_OUTOF10: 7
PAINLEVEL_OUTOF10: 10

## 2022-07-26 NOTE — PROGRESS NOTES
Patient having hallucinations and becoming increasingly agitated. She rejects reorientation and escalates with attempts at reorientation. She hasn't slept in two days. She's had several episodes of acute confusion and hallucinations throughout the day. And now believes that her boyfriend is in the next room engaging in sexual intercourse with the 's mother. To that end she has been banging on the adjoining wall and yelling. She wants to leave and go home. When RN asks patient to please stop yelling and banging on the wall because this is a hospital and there are other patients trying to sleep patient responded with \"I don't give a fuck. \"  When RN tells patient that her boyfriend isn't at the hospital but at home, patient responds with Narayan Malik, he lives next door. \"  Code violet called because patient intent on leaving room and going into adjoining room to confront her boyfriend and then going home. Contacted NP. See orders. Ativan administered per orders. See MAR.
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
## 2022-07-26 NOTE — CARE COORDINATION
Spoke with Bola Garcia with [facility], unable to provide SN. [facility] accepted.     #[phone]  Electronically signed by Libertad Pillai RN on 7/26/2022 at 1:42 PM

## 2022-07-26 NOTE — PROGRESS NOTES
input(s): AST, ALT, BILIDIR, BILITOT, ALKPHOS in the last 72 hours. No results for input(s): INR in the last 72 hours. No results for input(s): Aaron Raza in the last 72 hours. Urinalysis:      Lab Results   Component Value Date/Time    NITRU Negative 07/25/2022 01:26 PM    WBCUA 4 07/25/2022 01:26 PM    BACTERIA None Seen 07/25/2022 01:26 PM    RBCUA 0 07/25/2022 01:26 PM    BLOODU Negative 07/25/2022 01:26 PM    SPECGRAV 1.005 07/25/2022 01:26 PM    GLUCOSEU Negative 07/25/2022 01:26 PM       Radiology:  CT ABDOMEN PELVIS W IV CONTRAST Additional Contrast? None   Final Result   No CT evidence of acute intra-abdominal pathology. Cholelithiasis without evidence of cholecystitis. Unchanged 1.6 cm right adrenal nodule. Recommend further evaluation with   nonemergent adrenal protocol CT. MRI brain without contrast   Final Result   1. No acute intracranial abnormality. No acute infarct. 2. Mild-to-moderate global parenchymal volume loss with chronic microvascular   ischemic changes. CTA HEAD NECK W CONTRAST   Final Result   No CT evidence of an acute infarct. No flow limiting stenosis or large vessel occlusion detected within the head   or neck. CT HEAD WO CONTRAST   Final Result   No CT evidence of an acute infarct. No flow limiting stenosis or large vessel occlusion detected within the head   or neck. XR CHEST PORTABLE   Final Result   No acute process.       Stable cardiomegaly                 Assessment/Plan:    Active Hospital Problems    Diagnosis     Permanent atrial fibrillation (HCC) [I48.21]      Priority: Medium    TIA (transient ischemic attack) [G45.9]      Priority: Medium           Possible TIA/CVA  - with symptoms: dizziness/positional vertigo  - admit to OBS to RO TIA/CVA  - out of the tPA window  - head CT neg for acute pathology  - CTA head/neck: No flow limiting stenosis or large vessel occlusion  - MRI neg for acute infarct  - reviewed echo  - started on ASA, statin  - check lipids, HBA1c  - try meclizine     Cardiomyopathy - EF 35-40% on echo and cardiology consulted for recs. Started on valsartan/lasix     Acute metabolic encephalopathy with visual hallucination - re-consulted neurology. Check urine drug screen neg. ?withdrawal. Restarted norco 5/325 for now. Check TSH/vit B12, ? ETOH history. Will consult psych. CT abdomen reviewed with no acute finding. Getting EEG     Atrial Fibrillation  - currently rate controlled  - continue Xarelto and metoprolol      Essential (primary) hypertension  - monitor blood pressure  - continue home meds     Hyperlipidemia  - continue statin        DVT Prophylaxis: Xarelto  Diet: ADULT DIET;  Regular  Code Status: Full Code    PT/OT Eval Status: ordered    Dispo - cont care, await psych eval    Prasanna Berry MD

## 2022-07-26 NOTE — PLAN OF CARE
Problem: Discharge Planning  Goal: Discharge to home or other facility with appropriate resources  7/26/2022 0403 by Alex Montelongo RN  Outcome: Progressing Towards Goal  Case mgmt involved to assess appropriate level of care and resources. 7/25/2022 1841 by Angelita Okeefe RN  Outcome: Progressing Towards Goal       Problem: Safety - Adult  Goal: Free from fall injury  7/26/2022 0403 by Alex Montelongo RN  Outcome: Progressing Towards Goal  Fall risk assessment completed every shift. All precautions in place. Patient has call light with in reach at all times. Room free from clutter. Patient aware to call for assistance when getting up. Flowsheets (Taken 7/25/2022 1844 by Angelita kOeefe RN)  Free From Fall Injury: Instruct family/caregiver on patient safety  7/25/2022 1841 by Angelita Okeefe RN  Outcome: Progressing Towards Goal       Problem: ABCDS Injury Assessment  Goal: Absence of physical injury  7/26/2022 0403 by Alex Montelongo RN  Outcome: Progressing Towards Goal  Patient free of physical injury  Flowsheets (Taken 7/25/2022 1844 by Angelita Okeefe RN)  Absence of Physical Injury: Implement safety measures based on patient assessment  7/25/2022 1841 by Angelita Okeefe RN  Outcome: Progressing Towards Goal     Problem: Confusion  Goal: Confusion, delirium, dementia, or psychosis is improved or at baseline  Description: INTERVENTIONS:  1. Assess for possible contributors to thought disturbance, including medications, impaired vision or hearing, underlying metabolic abnormalities, dehydration, psychiatric diagnoses, and notify attending LIP  2. [place] high risk fall precautions, as indicated  3. Provide frequent short contacts to provide reality reorientation, refocusing and direction  4. Decrease environmental stimuli, including noise as appropriate  5. Monitor and intervene to maintain adequate nutrition, hydration, elimination, sleep and activity  6.  If unable to ensure safety without constant attention obtain sitter and review sitter guidelines with assigned personnel  7. Initiate Psychosocial CNS and Spiritual Care consult, as indicated  Outcome: Progressing Towards Goal   provided for patient safety. Psychiatry consulted.

## 2022-07-26 NOTE — PROGRESS NOTES
Physical Therapy    Thelmanaomi Kaitlin  [id number]  [id number]    Attempted to see for PT session however pt out of room for testing; will attempt later as schedule permits  Electronically signed by FITO CAMERON PT on 7/26/2022 at 1:39 PM

## 2022-07-26 NOTE — PLAN OF CARE
Problem: Discharge Planning  Goal: Discharge to home or other facility with appropriate resources  7/26/2022 1534 by Heather Arias RN  Outcome: Progressing Towards Goal  7/26/2022 0403 by Garry Mederos RN  Outcome: Progressing Towards Goal     Problem: Safety - Adult  Goal: Free from fall injury  7/26/2022 1534 by Heather Arias RN  Outcome: Progressing Towards Goal  7/26/2022 0403 by Garry Mederos RN  Outcome: Progressing Towards Goal  Flowsheets (Taken 7/25/2022 1844 by Sina Ortiz RN)  Free From Fall Injury: Instruct family/caregiver on patient safety     Problem: ABCDS Injury Assessment  Goal: Absence of physical injury  7/26/2022 1534 by Heather Arias RN  Outcome: Progressing Towards Goal  7/26/2022 0403 by Garry Mederos RN  Outcome: Progressing Towards Goal  Flowsheets (Taken 7/25/2022 1844 by Sina Ortiz RN)  Absence of Physical Injury: Implement safety measures based on patient assessment     Problem: Confusion  Goal: Confusion, delirium, dementia, or psychosis is improved or at baseline  Description: INTERVENTIONS:  1. Assess for possible contributors to thought disturbance, including medications, impaired vision or hearing, underlying metabolic abnormalities, dehydration, psychiatric diagnoses, and notify attending LIP  2. [place] high risk fall precautions, as indicated  3. Provide frequent short contacts to provide reality reorientation, refocusing and direction  4. Decrease environmental stimuli, including noise as appropriate  5. Monitor and intervene to maintain adequate nutrition, hydration, elimination, sleep and activity  6. If unable to ensure safety without constant attention obtain sitter and review sitter guidelines with assigned personnel  7.  Initiate Psychosocial CNS and Spiritual Care consult, as indicated  7/26/2022 1534 by Heather Arias RN  Outcome: Progressing Towards Goal  Flowsheets (Taken 7/26/2022 0800)  Effect of thought disturbance (confusion, delirium, dementia, or psychosis) are managed with adequate functional status:   Assess for contributors to thought disturbance, including medications, impaired vision or hearing, underlying metabolic abnormalities, dehydration, psychiatric diagnoses, notify LIP   Decrease environmental stimuli, including noise as appropriate  7/26/2022 0403 by Garry Mederos RN  Outcome: Progressing Towards Goal

## 2022-07-26 NOTE — PROGRESS NOTES
Photic Stim not available, EEG completed and available for interpretation on the [employer].

## 2022-07-26 NOTE — PROGRESS NOTES
Occupational Therapy    Attempted to see for OT session however pt out of room for testing; will attempt later as schedule permits.     Electronically signed by Radha Jones OT on 7/26/2022 at 2:40 PM

## 2022-07-26 NOTE — PROGRESS NOTES
Physician Progress Note      Beck Mckeon  [facility] #:                  [id number]  :                       1950  ADMIT DATE:       2022 2:01 PM  [address] [place] DATE:  RESPONDING  PROVIDER #:        Cindy Ha MD          QUERY TEXT:    Pt admitted with dizziness/positional vertigo. Per H&P and progress notes   \"Possible TIA/CVA. \"  MRI shows no acute infarct. Pt with known persistent   atrial fibrillation. If possible, please document in progress notes and   discharge summary the relationship, if any, between dizziness/positional   vertigo and possible TIA/CVA ***. The medical record reflects the following:  Risk Factors: persistent atrial fib  Clinical Indicators:  dizziness/positional vertigo; MRI negative for acute   infarct  Treatment:  CT, MRI, ECHO    Thank you,  Odilia Pelletier RN, BSN, [email or web address]  Options provided:  -- Embolic TIA due to cerebral embolism. CVA ruled out.  -- Other - I will add my own diagnosis  -- Disagree - Not applicable / Not valid  -- Disagree - Clinically unable to determine / Unknown  -- Refer to Clinical Documentation Reviewer    PROVIDER RESPONSE TEXT:    This patient had an embolic TIA due to cerebral embolism. CVA ruled out after   study    Query created by: Gisselle Warren on 2022 8:04 AM      QUERY TEXT:    Pt admitted with dizziness/positional vertigo. Noted to have persistent   atrial fibrillation and on Xarelto. If possible, please document in progress   notes and discharge summary if you are evaluating and/or treating any of the   following: The medical record reflects the following:  Risk Factors: atrial fib  Clinical Indicators: longstanding hx persistent atrial fibrillation  Treatment: Xarelto    Thank you,  Odilia Pelletier RN, BSN, [email or web address]  Options provided:  -- Secondary hypercoagulable state in a patient with atrial fibrillation  -- Other - I will add my own diagnosis  -- Disagree - Not applicable / Not valid  -- Disagree - Clinically unable to determine / Unknown  -- Refer to Clinical Documentation Reviewer    PROVIDER RESPONSE TEXT:    This patient has secondary hypercoagulable state related to atrial   fibrillation.     Query created by: Kenny Arteaga on 7/25/2022 8:05 AM      Electronically signed by:  Zac Vazquez MD 7/26/2022 12:36 AM

## 2022-07-26 NOTE — PROCEDURES
[address] [facility]                          ELECTROENCEPHALOGRAM REPORT    PATIENT NAME: Talat Fuentes                    :        1950  MED REC NO:   [id number]                          ROOM:       5106  ACCOUNT NO:   [de-identified]                           ADMIT DATE: 2022  PROVIDER:     Sahil Amezquita Mc, DO    DATE OF EE2022    REFERRING PROVIDER:  Erik Bonilla NP    REASON FOR STUDY:  Hallucinations. BRIEF HISTORY AND NEUROLOGIC FINDINGS:  The patient is a 72-year-old  female being evaluated for reported hallucinations. MEDICATIONS:  The patient's centrally acting medications listed include  Norco, Antivert and Geodon. EEG FINDINGS:  This is a 20-channel digital EEG performed utilizing  bipolar and referential montages. Wakefulness, drowsiness and sleep  were obtained during the recording. During maximum wakefulness, there  was a moderate voltage, symmetric, fairly well-regulated though somewhat  disorganized at times, 7 Hz posterior background rhythm. The anterior  background consists of low to moderate voltage mixed frequencies. Drowsiness is manifested by attenuation of the waking background  rhythms. Sleep was manifested by sleep spindles and vertex waves. Occasional left hemispheric sharp waves were noted during the recording. These appeared to have a maximum in the left posterior temporal region,  occasionally with after-going slow waves. Photic stimulation was not performed due to technical factors. Hyperventilation exercise was not performed due to the patient's age  and/or clinical history. A 1-channel EKG rhythm strip was reviewed and showed an irregular  cardiac rhythm. EEG DIAGNOSIS:  This EEG is abnormal due to the presence of left  hemispheric sharp waves, most notably in the posterior left temporal  region.   Mild background slowing and disorganization was also present. CLINICAL INTERPRETATION:  The focal sharp waves are suggestive of an  area of possible focal cortical irritability involving the posterior  left temporal region or other surrounding region. The background  slowing and disorganization is nonspecific and consistent with a mild  encephalopathy. This may be seen in multiple settings including toxic,  metabolic, or degenerative conditions as well as with medication effect. An incidental note is made of an abnormal and irregular cardiac rhythm  which appeared consistent with atrial fibrillation. If seizures remain  a clinical concern, then a repeat EEG may be of further benefit. Clinical correlation is advised.         Alyssa Mackay DO    D: 07/26/2022 16:15:32       T: 07/26/2022 16:18:00     [id number]  Job#: [id number]     Doc#: [id number]    CC:

## 2022-07-27 PROBLEM — R44.1 VISUAL HALLUCINATIONS: Status: ACTIVE | Noted: 2022-07-27

## 2022-07-27 LAB
INR BLD: 1.33 (ref 0.87–1.14)
PROTHROMBIN TIME: 16.4 SEC (ref 11.7–14.5)

## 2022-07-27 PROCEDURE — 36415 COLL VENOUS BLD VENIPUNCTURE: CPT

## 2022-07-27 PROCEDURE — 97116 GAIT TRAINING THERAPY: CPT | Performed by: PHYSICAL THERAPIST

## 2022-07-27 PROCEDURE — 94640 AIRWAY INHALATION TREATMENT: CPT

## 2022-07-27 PROCEDURE — 97530 THERAPEUTIC ACTIVITIES: CPT

## 2022-07-27 PROCEDURE — 6370000000 HC RX 637 (ALT 250 FOR IP): Performed by: INTERNAL MEDICINE

## 2022-07-27 PROCEDURE — 6370000000 HC RX 637 (ALT 250 FOR IP): Performed by: NURSE PRACTITIONER

## 2022-07-27 PROCEDURE — 97535 SELF CARE MNGMENT TRAINING: CPT

## 2022-07-27 PROCEDURE — 97530 THERAPEUTIC ACTIVITIES: CPT | Performed by: PHYSICAL THERAPIST

## 2022-07-27 PROCEDURE — 85610 PROTHROMBIN TIME: CPT

## 2022-07-27 PROCEDURE — 2060000000 HC ICU INTERMEDIATE R&B

## 2022-07-27 PROCEDURE — 99232 SBSQ HOSP IP/OBS MODERATE 35: CPT | Performed by: NURSE PRACTITIONER

## 2022-07-27 PROCEDURE — 94760 N-INVAS EAR/PLS OXIMETRY 1: CPT

## 2022-07-27 RX ORDER — QUETIAPINE FUMARATE 25 MG/1
12.5 TABLET, FILM COATED ORAL EVERY 8 HOURS PRN
Status: DISCONTINUED | OUTPATIENT
Start: 2022-07-27 | End: 2022-07-29 | Stop reason: HOSPADM

## 2022-07-27 RX ORDER — QUETIAPINE FUMARATE 25 MG/1
25 TABLET, FILM COATED ORAL NIGHTLY
Status: DISCONTINUED | OUTPATIENT
Start: 2022-07-27 | End: 2022-07-29 | Stop reason: HOSPADM

## 2022-07-27 RX ORDER — ZIPRASIDONE MESYLATE 20 MG/ML
10 INJECTION, POWDER, LYOPHILIZED, FOR SOLUTION INTRAMUSCULAR
Status: ACTIVE | OUTPATIENT
Start: 2022-07-27 | End: 2022-07-27

## 2022-07-27 RX ORDER — METOPROLOL SUCCINATE 25 MG/1
25 TABLET, EXTENDED RELEASE ORAL DAILY
Status: DISCONTINUED | OUTPATIENT
Start: 2022-07-28 | End: 2022-07-29 | Stop reason: HOSPADM

## 2022-07-27 RX ADMIN — HYDROCODONE BITARTRATE AND ACETAMINOPHEN 1 TABLET: 5; 325 TABLET ORAL at 17:21

## 2022-07-27 RX ADMIN — HYDROCODONE BITARTRATE AND ACETAMINOPHEN 1 TABLET: 5; 325 TABLET ORAL at 09:01

## 2022-07-27 RX ADMIN — HYDROCODONE BITARTRATE AND ACETAMINOPHEN 1 TABLET: 5; 325 TABLET ORAL at 01:59

## 2022-07-27 RX ADMIN — ATORVASTATIN CALCIUM 80 MG: 80 TABLET, FILM COATED ORAL at 22:20

## 2022-07-27 RX ADMIN — ALBUTEROL SULFATE 2 PUFF: 90 AEROSOL, METERED RESPIRATORY (INHALATION) at 20:27

## 2022-07-27 RX ADMIN — ALBUTEROL SULFATE 2 PUFF: 90 AEROSOL, METERED RESPIRATORY (INHALATION) at 07:35

## 2022-07-27 RX ADMIN — VALSARTAN 40 MG: 80 TABLET, FILM COATED ORAL at 09:01

## 2022-07-27 RX ADMIN — QUETIAPINE FUMARATE 12.5 MG: 25 TABLET ORAL at 17:21

## 2022-07-27 RX ADMIN — QUETIAPINE FUMARATE 25 MG: 25 TABLET ORAL at 22:20

## 2022-07-27 RX ADMIN — METOPROLOL SUCCINATE 50 MG: 50 TABLET, EXTENDED RELEASE ORAL at 09:01

## 2022-07-27 RX ADMIN — ASPIRIN 81 MG: 81 TABLET, COATED ORAL at 09:01

## 2022-07-27 ASSESSMENT — PAIN SCALES - GENERAL
PAINLEVEL_OUTOF10: 4
PAINLEVEL_OUTOF10: 2
PAINLEVEL_OUTOF10: 2
PAINLEVEL_OUTOF10: 0
PAINLEVEL_OUTOF10: 0
PAINLEVEL_OUTOF10: 10
PAINLEVEL_OUTOF10: 0
PAINLEVEL_OUTOF10: 0

## 2022-07-27 ASSESSMENT — PAIN - FUNCTIONAL ASSESSMENT
PAIN_FUNCTIONAL_ASSESSMENT: ACTIVITIES ARE NOT PREVENTED
PAIN_FUNCTIONAL_ASSESSMENT: ACTIVITIES ARE NOT PREVENTED

## 2022-07-27 ASSESSMENT — PAIN SCALES - WONG BAKER: WONGBAKER_NUMERICALRESPONSE: 0

## 2022-07-27 ASSESSMENT — PAIN DESCRIPTION - LOCATION: LOCATION: KNEE

## 2022-07-27 ASSESSMENT — PAIN DESCRIPTION - ORIENTATION: ORIENTATION: MID

## 2022-07-27 NOTE — PROGRESS NOTES
Physical Therapy  Facility/Department: [facility] PROGRESS[facility] CARE  Physical Therapy Initial Assessment    Name: Tana Ceja  : 1950  MRN: [id number]  Date of Service: 2022    Discharge Recommendations:  Home with assist PRN, Home with Home health PT   PT Equipment Recommendations  Equipment Needed: No    Tana Ceja scored a 19/24 on the AM-PAC short mobility form. Current research shows that an AM-PAC score of 18 or greater is typically associated with a discharge to the patient's home setting. Based on the patient's AM-PAC score and their current functional mobility deficits, it is recommended that the patient have 2-3 sessions per week of Physical Therapy at d/c to increase the patient's independence. At this time, this patient demonstrates the endurance and safety to discharge home with home PT and a follow up treatment frequency of 2-3x/wk. Please see assessment section for further patient specific details. If patient discharges prior to next session this note will serve as a discharge summary. Please see below for the latest assessment towards goals. Patient Diagnosis(es): The primary encounter diagnosis was Dizziness. A diagnosis of Unable to ambulate was also pertinent to this visit. Past Medical History:  has a past medical history of Arthritis, Atrial fibrillation ([address] Depression, Hyperlipidemia, and Hypertension. Past Surgical History:  has a past surgical history that includes  section; Cardiac catheterization; and hernia repair.     Assessment   Assessment: Pt no longer c/o dizziness but having intermittent hallucinations; pt scheduled for a lumbar punture later this date; anticipate pt will be safe to return home with PRN assist when medically stable; pt may benefit from home PT to ensure safe transition to home  Therapy Prognosis: Good  Requires PT Follow-Up: Yes  Activity Tolerance  Activity Tolerance: Patient tolerated treatment well     Plan   Plan  Plan: 3-5 times per week  Current Treatment Recommendations: Strengthening, ROM, Balance training, Functional mobility training, Transfer training, Gait training, Stair training, Safety education & training, Patient/Caregiver education & training, Equipment evaluation, education, & procurement, Therapeutic activities  Safety Devices  Type of Devices: Call light within reach, Chair alarm in place, Gait belt, Patient at risk for falls, Left in chair     Restrictions  Restrictions/Precautions  Restrictions/Precautions: Fall Risk     Subjective   General  Chart Reviewed: Yes  Additional Pertinent Hx: Per KEELEY Poole - CNP    H&P on 7-: The pt is a 74 yo female who presented to the ED with 2 day h/o of dizziness and is being worked up for a TIA. Imaging negative for changes. PMHx: arth, a-fib, depression, HTN  Response To Previous Treatment: Patient with no complaints from previous session.   Family / Caregiver Present: No  Referring Practitioner: Soto Sanchez MD  Referral Date : 07/22/22  Diagnosis: Possible TIA/CVA; dizziness/positional vertigo  Follows Commands: Within Functional Limits  Other (Comment): pt having intermittent hallucinations  Subjective  Subjective: pt up in chair at beginning and end of session; pt agreeable to getting up and using commode with therapy         Social/Functional History  Social/Functional History  Lives With: Alone  Type of Home: Apartment  Home Layout: One level  Home Access: Stairs to enter with rails  Entrance Stairs - Number of Steps: 3  Entrance Stairs - Rails: Both  Bathroom Shower/Tub: Tub/Shower unit  Bathroom Toilet:  (BSC)  Bathroom Equipment: Grab bars in shower, Shower chair  Home Equipment: [employer] [place], 4 wheeled  Has the patient had two or more falls in the past year or any fall with injury in the past year?:  (a lot of \"close calls\" recently d/t dizziness; 2 falls in the past year)  Receives Help From: Other (comment) ([facility])  ADL Assistance: Independent  Homemaking Assistance:  (BF does laundry and does grocery shopping; HHA cleans; Gets MOW and heats up in microwave)  Ambulation Assistance: Needs assistance  Transfer Assistance: Needs assistance  Active : No (family transport/public transport)  Mode of Transportation: Car, Bus  Occupation: Retired  Vision/Hearing  Vision  Vision: Impaired (\"supposed to wear glasses but I don't\")  Hearing  Hearing: Within functional limits    Cognition   Cognition  Cognition Comment: intermittent hallucination; aware some are hallucinations but relating other events that she thinks are real but were also hallucinations     Objective   Heart Rate: 62  Heart Rate Source: Monitor  BP: (!) 93/53  BP Location: Left Arm  BP Method: Automatic  MAP (Calculated): 66.33  Resp: 16  SpO2: 95 %  O2 Device: None (Room air)                          Bed Mobility Training  Bed Mobility Training: No  Transfer Training  Transfer Training: Yes  Overall Level of Assistance: Stand-by assistance (managing Heart monitor only)  Sit to Stand: Stand-by assistance (using [id number])  Stand to Sit: Stand-by assistance  Toilet Transfer: Stand-by assistance  Bed mobility  Bed Mobility Comments: Pt in chair at start/end of session  Transfers  Sit to Stand: Stand by assistance  Stand to sit: Stand by assistance  Ambulation  Surface: level tile  Device: Rollator  Assistance: Stand by assistance  Quality of Gait: slightly flexed posturel moderate pace, able to manage the rollator well without LOB  Distance: 25'x2 and 3'  Comments: used commode with SBA; washed hands at sink and completed grooming tasks SBA     Balance  Sitting - Static: Good  Sitting - Dynamic: Good  Standing - Static: Good (at the sink)  Standing - Dynamic: Good (with rollator)  Comments: the pt statically stood at the sink x 2-3 minutes with SBA for brushing teeth and washing her hands           OutComes Score                                                  AM-PAC Score  AM-PAC
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
## 2022-07-27 NOTE — PLAN OF CARE
Problem: Discharge Planning  Goal: Discharge to home or other facility with appropriate resources  Outcome: Progressing Towards Goal  Flowsheets (Taken 7/27/2022 0535)  Discharge to home or other facility with appropriate resources:   Identify barriers to discharge with patient and caregiver   Arrange for needed discharge resources and transportation as appropriate   Identify discharge learning needs (meds, wound care, etc)     Problem: Safety - Adult  Goal: Free from fall injury  Outcome: Progressing Towards Goal  Flowsheets (Taken 7/27/2022 0535)  Free From Fall Injury: Instruct family/caregiver on patient safety  Note: Bed alarm on, bed in lowest position, wheels locked. Fall risk assessment completed every shift. Nonskid socks on, fall sign posted. Pt educated on use of call light. Problem: ABCDS Injury Assessment  Goal: Absence of physical injury  Outcome: Progressing Towards Goal  Flowsheets (Taken 7/27/2022 0535)  Absence of Physical Injury: Implement safety measures based on patient assessment  Note: No physical injury this shift. Problem: Confusion  Goal: Confusion, delirium, dementia, or psychosis is improved or at baseline  Description: INTERVENTIONS:  1. Assess for possible contributors to thought disturbance, including medications, impaired vision or hearing, underlying metabolic abnormalities, dehydration, psychiatric diagnoses, and notify attending LIP  2. [place] high risk fall precautions, as indicated  3. Provide frequent short contacts to provide reality reorientation, refocusing and direction  4. Decrease environmental stimuli, including noise as appropriate  5. Monitor and intervene to maintain adequate nutrition, hydration, elimination, sleep and activity  6. If unable to ensure safety without constant attention obtain sitter and review sitter guidelines with assigned personnel  7.  Initiate Psychosocial CNS and Spiritual Care consult, as indicated  Outcome: Progressing Towards Goal  Flowsheets (Taken 7/27/2022 7241)  Effect of thought disturbance (confusion, delirium, dementia, or psychosis) are managed with adequate functional status:   Assess for contributors to thought disturbance, including medications, impaired vision or hearing, underlying metabolic abnormalities, dehydration, psychiatric diagnoses, notify [facility] high risk fall precautions, as indicated   Provide frequent short contacts to provide reality reorientation, refocusing and direction

## 2022-07-27 NOTE — PLAN OF CARE
including noise as appropriate  5. Monitor and intervene to maintain adequate nutrition, hydration, elimination, sleep and activity  6. If unable to ensure safety without constant attention obtain sitter and review sitter guidelines with assigned personnel  7.  Initiate Psychosocial CNS and Spiritual Care consult, as indicated  7/27/2022 0802 by Adama Oliver RN  Outcome: Progressing Towards Goal  7/27/2022 0535 by James Baron RN  Outcome: Progressing Towards Goal  Flowsheets (Taken 7/27/2022 0535)  Effect of thought disturbance (confusion, delirium, dementia, or psychosis) are managed with adequate functional status:   Assess for contributors to thought disturbance, including medications, impaired vision or hearing, underlying metabolic abnormalities, dehydration, psychiatric diagnoses, notify [facility] high risk fall precautions, as indicated   Provide frequent short contacts to provide reality reorientation, refocusing and direction

## 2022-07-27 NOTE — CONSULTS
Psychiatry Consultation/Initial Inpatient Eval  Ale Humphreybladimir Fearing  7/27/2022  2:10 PM      Referring Provider:  Cr Gabriel MD    Reason for Consult/CC: Hallucinations    Assessment: Resting in bed. Alert and oriented x 4. Pleasant. Some concerns about visual hallucinations but she is redirectable and aware that what she is seeing is not there. No psych hx. Neuro on board, LP recommended. Seems like that will be happening tomorrow per RN. Recommendations/Plan:    Hallucinations: Can use Seroquel nightly 25 mg. Use Seroquel 12.5 mg PRN for agitation. Plans for LP tomorrow. She has not needed PRN IM antipsychotic but I will add just in case her behavior worsens. 2.   Continue to redirect patient. Easily done today. 3.   Continue supportive care. Prevent infections, dehydration as this can cause or worsen delirium  4. Avoid benzos, anticholingeric drugs       1) Safety Risk: NO Imminent risk of danger to/self/others based on the factors considered below. Appropriate for outpatient level of care. Safety plan includes: 911, PES, hotlines, and interventions discussed today. 2) Dispo: OK to d/c home when medically stable    Thank you for allowing me to participate in this patients care. Please call the psych consult line [phone] with questions or concerns. Ale Eugene, LARON  Psychiatric Mental Health Nurse Practitioner     _________________________________________________________________________________    HPI: Patient is an 73 y.o. , female who presented to  after developing dizziness over a week ago, initially intermittent with associated nausea. On 7/24/22, she started to develop hallucinations which was new development for her. Neuro on board. Completed MRI, unrevealing. Etiology of hallucinations unclear at this point as neuro workup has been unrevealing. Recommended EEG, B12, tox screen, TSH, ammonia levels. No psych hx per chart review.      Assc Sx:  Visual hallucinations    Modifying Factors: Unsure etiology, unsure what is making them occur at this time    Duration:  Days- week  Severity:  Severe  Timing: Acute    Collateral Information: Zulay Arm. Examination  Review of Systems - General ROS: negative  Gastrointestinal ROS: no abdominal pain, change in bowel habits, or black or bloody stools  Genito-Urinary ROS: no dysuria, trouble voiding, or hematuria  Musculoskeletal ROS: negative  Neurological ROS: no TIA or stroke symptoms     Past Psychiatric History: I have reviewed recent documentation for this patient:  Em Bowser is a 73 y.o. female  who  has a past medical history of Arthritis, Atrial fibrillation ([facility] [address] Depression, Hyperlipidemia, and Hypertension.                 Hospitalizations: None   Inpatient Stays: None              Diagnoses: No previous psych dx              Med trials: None              Outpatient Treatment: None              NSSI: None              Suicide Attempts: None   Trauma Hx: Patient denies   Conduct Hx: Patient denies     Substance Use History:              Nicotine:  Denies              Alcohol: Denies              Illicits: Denies, tox screen clean      Past Medical History:     Past Medical History:   Diagnosis Date    Arthritis     Atrial fibrillation (HCC)     Depression     Hyperlipidemia     Hypertension        Allergies   Allergen Reactions    Pcn [Penicillins]        Social/Developmental History:               Relationship: Single              Children: One child              Supports: Family supports, sister              Housing: Lives alone              Occ/Inc: Unemployeed    Social History     Socioeconomic History    Marital status: Single     Spouse name: None    Number of children: None    Years of education: None    Highest education level: None   Tobacco Use    Smoking status: Never    Smokeless tobacco: Never   Substance and Sexual Activity    Alcohol use: No    Drug use: No       Family History:     Family History   Problem Relation Age of Onset    Diabetes Mother     High Cholesterol Father     High Blood Pressure Father     Diabetes Maternal Uncle     Diabetes Maternal Grandmother            MSE:    Appearance    In chair, resting  Speech    spontaneous, normal rate, and normal volume  Mood    \"Alright\"  Affect    normal affect  Thought Content    V hallucinations  Thought Process    coherent  Associations    logical connections  Insight    Fair  Judgment    Fair  No abnormal movements, tics or mannerisms. Orientation    oriented to person, place, time, and general circumstances  Memory    recent and remote memory intact  Attention/Concentration    intact  Language    0 - no aphasia, normal  Fund of Knowledge    intact  Suicide Assessment    no suicidal ideation      Diagnosis:    Axis I  Visual Hallucinations    Axis III       Diagnosis Date    Arthritis     Atrial fibrillation ([facility] [address]     Depression     Hyperlipidemia     Hypertension       Principal Problem:    TIA (transient ischemic attack)  Active Problems:    Permanent atrial fibrillation ([facility] [address]  Resolved Problems:    * No resolved hospital problems.  *       Axis IV  Other psychosocial and environmental problems    Objective:    /69   Pulse 66   Temp 98.6 °F (37 °C) (Oral)   Resp 18   Ht 5' 7\" (1.702 m)   Wt 277 lb 1.9 oz (125.7 kg)   SpO2 95%   BMI 43.40 kg/m²     [unfilled]     [START ON 7/28/2022] metoprolol succinate  25 mg Oral Daily    ziprasidone (GEODON) in sterile water injection  10 mg IntraMUSCular Once    HYDROcodone 5 mg - acetaminophen  1 tablet Oral Q8H    valsartan  40 mg Oral Daily    albuterol sulfate HFA  2 puff Inhalation BID    [Held by provider] rivaroxaban  20 mg Oral Dinner    aspirin  81 mg Oral Daily    Or    aspirin  300 mg Rectal Daily    atorvastatin  80 mg Oral Nightly     calcium carbonate, meclizine, ondansetron **OR** ondansetron, polyethylene glycol, labetalol     EKG: atrial fibrillatio      Recent Results (from the past 168 hour(s))   POCT Glucose    Collection Time: 07/22/22  2:12 PM   Result Value Ref Range    POC Glucose 90 70 - 99 mg/dl    Performed on ACCU-CHEK    EKG 12 Lead    Collection Time: 07/22/22  2:19 PM   Result Value Ref Range    Ventricular Rate 83 BPM    Atrial Rate 55 BPM    QRS Duration 140 ms    Q-T Interval 410 ms    QTc Calculation (Bazett) 481 ms    R Axis -24 degrees    T Axis 10 degrees    Diagnosis       Atrial fibrillationLeft bundle branch blockConfirmed by ERICA RAZA, Omayra Dash ([id number]) on 7/23/2022 7:00:34 PM   CBC with Auto Differential    Collection Time: 07/22/22  2:54 PM   Result Value Ref Range    WBC 7.3 4.0 - 11.0 K/uL    RBC 4.55 4.00 - 5.20 M/uL    Hemoglobin 13.3 12.0 - 16.0 g/dL    Hematocrit 40.4 36.0 - 48.0 %    MCV 88.9 80.0 - 100.0 fL    MCH 29.3 26.0 - 34.0 pg    MCHC 33.0 31.0 - 36.0 g/dL    RDW 16.4 (H) 12.4 - 15.4 %    Platelets 902 773 - 924 K/uL    MPV 7.7 5.0 - 10.5 fL    Neutrophils % 64.6 %    Lymphocytes % 24.3 %    Monocytes % 7.6 %    Eosinophils % 2.9 %    Basophils % 0.6 %    Neutrophils Absolute 4.7 1.7 - 7.7 K/uL    Lymphocytes Absolute 1.8 1.0 - 5.1 K/uL    Monocytes Absolute 0.6 0.0 - 1.3 K/uL    Eosinophils Absolute 0.2 0.0 - 0.6 K/uL    Basophils Absolute 0.0 0.0 - 0.2 K/uL   Comprehensive Metabolic Panel    Collection Time: 07/22/22  2:54 PM   Result Value Ref Range    Sodium 136 136 - 145 mmol/L    Potassium 4.5 3.5 - 5.1 mmol/L    Chloride 102 99 - 110 mmol/L    CO2 20 (L) 21 - 32 mmol/L    Anion Gap 14 3 - 16    Glucose 99 70 - 99 mg/dL    BUN 16 7 - 20 mg/dL    Creatinine 0.6 0.6 - 1.2 mg/dL    GFR Non-African American >60 >60    GFR African American >60 >60    Calcium 8.8 8.3 - 10.6 mg/dL    Total Protein 6.9 6.4 - 8.2 g/dL    Albumin 3.7 3.4 - 5.0 g/dL    Albumin/Globulin Ratio 1.2 1.1 - 2.2    Total Bilirubin 1.6 (H) 0.0 - 1.0 mg/dL    Alkaline Phosphatase 123 40 - 129 U/L    ALT <5 (L) 10 - 40 U/L    AST 14 (L) 15 - 37 U/L   Troponin    Collection Time: 07/22/22  2:54 PM   Result Value Ref Range    Troponin <0.01 <0.01 ng/mL   COVID-19, Rapid    Collection Time: 07/22/22  3:06 PM    Specimen: Nasopharyngeal Swab   Result Value Ref Range    SARS-CoV-2, NAAT Not Detected Not Detected   Urinalysis with Reflex to Culture    Collection Time: 07/22/22  4:15 PM    Specimen: Urine, clean catch   Result Value Ref Range    Color, UA Yellow Straw/Yellow    Clarity, UA SL CLOUDY (A) Clear    Glucose, Ur Negative Negative mg/dL    Bilirubin Urine Negative Negative    Ketones, Urine Negative Negative mg/dL    Specific Gravity, UA 1.010 1.005 - 1.030    Blood, Urine SMALL (A) Negative    pH, UA 7.5 5.0 - 8.0    Protein, UA Negative Negative mg/dL    Urobilinogen, Urine 0.2 <2.0 E.U./dL    Nitrite, Urine Negative Negative    Leukocyte Esterase, Urine SMALL (A) Negative    Microscopic Examination YES     Urine Type Cleancatch     Urine Reflex to Culture Not Indicated    Microscopic Urinalysis    Collection Time: 07/22/22  4:15 PM   Result Value Ref Range    WBC, UA 3-5 0 - 5 /HPF    RBC, UA 3-4 0 - 4 /HPF    Epithelial Cells, UA 6-10 (A) 0 - 5 /HPF    Bacteria, UA 2+ (A) None Seen /HPF    Yeast, UA Present (A) None Seen /HPF    Trichomonas, UA None Seen None Seen /HPF   CBC    Collection Time: 07/23/22  3:36 AM   Result Value Ref Range    WBC 6.1 4.0 - 11.0 K/uL    RBC 4.35 4.00 - 5.20 M/uL    Hemoglobin 12.7 12.0 - 16.0 g/dL    Hematocrit 38.3 36.0 - 48.0 %    MCV 88.0 80.0 - 100.0 fL    MCH 29.2 26.0 - 34.0 pg    MCHC 33.2 31.0 - 36.0 g/dL    RDW 15.7 (H) 12.4 - 15.4 %    Platelets 238 552 - 744 K/uL    MPV 7.5 5.0 - 10.5 fL   Hemoglobin A1c    Collection Time: 07/23/22  3:36 AM   Result Value Ref Range    Hemoglobin A1C 5.7 See comment %    eAG 116.9 mg/dL   Lipid panel - fasting    Collection Time: 07/23/22  3:36 AM   Result Value Ref Range    Cholesterol, Total 141 0 - 199 mg/dL    Triglycerides 96 0 - 150 mg/dL    HDL 55 40 - 60 mg/dL LDL Calculated 67 <100 mg/dL    VLDL Cholesterol Calculated 19 Not Established mg/dL   TSH with Reflex to FT4    Collection Time: 07/24/22  3:06 PM   Result Value Ref Range    TSH Reflex FT4 2.65 0.27 - 4.20 uIU/mL   Vitamin B12 & Folate    Collection Time: 07/24/22  3:06 PM   Result Value Ref Range    Vitamin B-12 278 211 - 911 pg/mL    Folate 5.92 4.78 - 24.20 ng/mL   CBC with Auto Differential    Collection Time: 07/25/22 12:03 PM   Result Value Ref Range    WBC 7.6 4.0 - 11.0 K/uL    RBC 4.64 4.00 - 5.20 M/uL    Hemoglobin 13.5 12.0 - 16.0 g/dL    Hematocrit 40.6 36.0 - 48.0 %    MCV 87.4 80.0 - 100.0 fL    MCH 29.1 26.0 - 34.0 pg    MCHC 33.3 31.0 - 36.0 g/dL    RDW 15.3 12.4 - 15.4 %    Platelets 515 511 - 011 K/uL    MPV 8.0 5.0 - 10.5 fL    Neutrophils % 61.3 %    Lymphocytes % 24.5 %    Monocytes % 10.2 %    Eosinophils % 3.3 %    Basophils % 0.7 %    Neutrophils Absolute 4.6 1.7 - 7.7 K/uL    Lymphocytes Absolute 1.9 1.0 - 5.1 K/uL    Monocytes Absolute 0.8 0.0 - 1.3 K/uL    Eosinophils Absolute 0.2 0.0 - 0.6 K/uL    Basophils Absolute 0.1 0.0 - 0.2 K/uL   Basic Metabolic Panel    Collection Time: 07/25/22 12:03 PM   Result Value Ref Range    Sodium 135 (L) 136 - 145 mmol/L    Potassium 4.6 3.5 - 5.1 mmol/L    Chloride 102 99 - 110 mmol/L    CO2 20 (L) 21 - 32 mmol/L    Anion Gap 13 3 - 16    Glucose 87 70 - 99 mg/dL    BUN 16 7 - 20 mg/dL    Creatinine 0.6 0.6 - 1.2 mg/dL    GFR Non-African American >60 >60    GFR African American >60 >60    Calcium 9.5 8.3 - 10.6 mg/dL   Urinalysis    Collection Time: 07/25/22  1:26 PM   Result Value Ref Range    Color, UA DARK YELLOW (A) Straw/Yellow    Clarity, UA Clear Clear    Glucose, Ur Negative Negative mg/dL    Bilirubin Urine Negative Negative    Ketones, Urine Negative Negative mg/dL    Specific Gravity, UA 1.005 1.005 - 1.030    Blood, Urine Negative Negative    pH, UA 5.5 5.0 - 8.0    Protein, UA Negative Negative mg/dL    Urobilinogen, Urine 1.0 <2.0 E.U./dL Nitrite, Urine Negative Negative    Leukocyte Esterase, Urine SMALL (A) Negative    Microscopic Examination YES     Urine Type NotGiven    Microscopic Urinalysis    Collection Time: 07/25/22  1:26 PM   Result Value Ref Range    Bacteria, UA None Seen None Seen /HPF    Hyaline Casts, UA 0 0 - 8 /LPF    WBC, UA 4 0 - 5 /HPF    RBC, UA 0 0 - 4 /HPF    Epithelial Cells, UA 2 0 - 5 /HPF    EPITHELIALS (RENAL), [id number] Present (A) None Seen    Mucus, UA Present (A) None Seen   Urine Drug Screen    Collection Time: 07/25/22  1:26 PM   Result Value Ref Range    Amphetamine Screen, Urine Neg Negative <1000ng/mL    Barbiturate Screen, Ur Neg Negative <200 ng/mL    Benzodiazepine Screen, Urine Neg Negative <200 ng/mL    Cannabinoid Scrn, Ur Neg Negative <50 ng/mL    Cocaine Metabolite Screen, Urine Neg Negative <300 ng/mL    Opiate Scrn, Ur Neg Negative <300 ng/mL    PCP Screen, Urine Neg Negative <25 ng/mL    Methadone Screen, Urine Neg Negative <300 ng/mL    Propoxyphene Scrn, Ur Neg Negative <300 ng/mL    Oxycodone Urine Neg Negative <100 ng/ml    pH, UA 6.0     Drug Screen Comment: see below

## 2022-07-27 NOTE — PROGRESS NOTES
Neurology Progress Note    Updates  Hallucinating overnight. Thought she was talking to a family member in bed. Also says she again saw a man being attacked by by two animals outside.       Medical History:  Past Medical History:   Diagnosis Date    Arthritis     Atrial fibrillation ([address]     Depression     Hyperlipidemia     Hypertension      Past Surgical History:   Procedure Laterality Date    CARDIAC CATHETERIZATION       SECTION      HERNIA REPAIR       Current Facility-Administered Medications:   ziprasidone (GEODON) 10 mg in sterile water 0.5 mL injection, 10 mg, IntraMUSCular, Once  HYDROcodone-acetaminophen (NORCO) 5-325 MG per tablet 1 tablet, 1 tablet, Oral, Q8H  calcium carbonate (TUMS) chewable tablet 500 mg, 500 mg, Oral, TID PRN  valsartan (DIOVAN) tablet 40 mg, 40 mg, Oral, Daily  meclizine (ANTIVERT) tablet 12.5 mg, 12.5 mg, Oral, TID PRN  albuterol sulfate HFA (PROVENTIL;VENTOLIN;PROAIR) 108 (90 Base) MCG/ACT inhaler 2 puff, 2 puff, Inhalation, BID  metoprolol succinate (TOPROL XL) extended release tablet 50 mg, 50 mg, Oral, Daily  [Held by provider] rivaroxaban (XARELTO) tablet 20 mg, 20 mg, Oral, Dinner  ondansetron (ZOFRAN-ODT) disintegrating tablet 4 mg, 4 mg, Oral, Q8H PRN **OR** ondansetron (ZOFRAN) injection 4 mg, 4 mg, IntraVENous, Q6H PRN  polyethylene glycol (GLYCOLAX) packet 17 g, 17 g, Oral, Daily PRN  aspirin EC tablet 81 mg, 81 mg, Oral, Daily **OR** aspirin suppository 300 mg, 300 mg, Rectal, Daily  atorvastatin (LIPITOR) tablet 80 mg, 80 mg, Oral, Nightly  labetalol (NORMODYNE;TRANDATE) injection 10 mg, 10 mg, IntraVENous, Q10 Min PRN    Medications Prior to Admission:   diclofenac sodium (VOLTAREN) 1 % GEL, Apply topically 4 times daily as needed for Pain  ondansetron (ZOFRAN ODT) 4 MG disintegrating tablet, Take 1 tablet by mouth every 8 hours as needed for Nausea  albuterol sulfate HFA (VENTOLIN HFA) 108 (90 Base) MCG/ACT inhaler, Inhale 2 puffs into the lungs 4 times daily for 5 days  rivaroxaban (XARELTO) 20 MG TABS tablet, Take 1 tablet by mouth daily  metoprolol succinate (TOPROL XL) 50 MG extended release tablet, Take 1 tablet by mouth daily  furosemide (LASIX) 20 MG tablet, Take 1 tablet by mouth daily  simvastatin (ZOCOR) 20 MG tablet, ONE PILL AT BEDTIME  tiZANidine (ZANAFLEX) 4 MG tablet, One pill every eight hours as needed  HYDROcodone-acetaminophen (NORCO) 7.5-325 MG per tablet, One pill every six hours PRN  lisinopril (PRINIVIL;ZESTRIL) 5 MG tablet, One pill every day    Allergies   Allergen Reactions    Pcn [Penicillins]      ROS  Constitutional- No weight loss or fevers  Eyes- No diplopia. No photophobia. Ears/nose/throat- No dysphagia. No Dysarthria  Cardiovascular- No palpitations. No chest pain  Respiratory- No dyspnea. No Cough  Gastrointestinal- No Abdominal pain. No Vomiting. Genitourinary- No incontinence. No urinary retention  Musculoskeletal- No myalgia. No arthralgia  Skin- No rash. No easy bruising. Psychiatric- No depression. No anxiety  Endocrine- No diabetes. No thyroid issues. Hematologic- No bleeding difficulty. No fatigue  Neurologic- No weakness. No Headache. Exam  Blood pressure (!) 93/53, pulse 62, temperature 97.5 °F (36.4 °C), temperature source Oral, resp. rate (!) 1, height 5' 7\" (1.702 m), weight 277 lb 1.9 oz (125.7 kg), SpO2 95 %. Constitutional    Vital signs: BP, HR, and RR reviewed   General alert, no distress  Eyes: unable to visualize the fundi  Cardiovascular: no peripheral edema. Psychiatric: cooperative with examination, no psychotic behavior noted. Neurologic  Mental status:   orientation to person, place. Attention intact as able to attend well to the exam     Language fluent in conversation   Comprehension intact; follows simple commands  Cranial nerves:   CN2: visual fields full   CN 3,4,6: extraocular muscles intact. Pupils are equal, round reactive bilaterally.     CN7: face symmetric without dysarthria  CN8: hearing grossly intact  CN12: tongue midline with protrusion  Strength: 5/5 BUE, 4/5 BLE. ROM in LEs is limited somewhat to knee pain. Sensory: light touch intact in all 4 extremities. Cerebellar/coordination: finger nose finger normal without ataxia  Tone: normal in all 4 extremities  Gait: deferred at this time for safety. Labs  Glucose 87  Na 135  K 4.6  BUN 16  Cr 0.6    TSH Reflex FT4 - 2.65  Folate 5.92  B12 - 278    WBC 7.6K  Hg 13.5  Platelets 026    LDL 67  HgA1c 5.7    COVID negative (+ 6/15/22)  Drug screen negative  UA small LE, 2+ bacteria, + yeast    Studies  MRI brain w/o 7/23/22  Impression   1. No acute intracranial abnormality. No acute infarct. 2. Mild-to-moderate global parenchymal volume loss with chronic microvascular   ischemic changes. CTA head/neck 7/22/22  Impression   No flow limiting stenosis or large vessel occlusion detected within the head   or neck. EEG 7/26/22  Left hemispheric sharp waves, most notably in the posterior left temporal region. Mild background slowing and disorganization was also present. Impression  Visual hallucinations. Seems to be mostly in the evening/overnight. Etiology is not certain. Brain MRI is negative. EEG does show some sharp waves mostly in the L temporal region. The significance of this is unclear. Yeast infection. Vertigo. Hypertension. Recommendations  Ordered LP. Supplement B12.      Germaine Vines NP  [address] [place] [address] [id number] Neurology    A copy of this note was provided for Dr Pooja Villasenor MD

## 2022-07-28 ENCOUNTER — APPOINTMENT (OUTPATIENT)
Dept: INTERVENTIONAL RADIOLOGY/VASCULAR | Age: 72
DRG: 067 | End: 2022-07-28
Payer: MEDICARE

## 2022-07-28 LAB
A/G RATIO: 0.9 (ref 1.1–2.2)
ALBUMIN SERPL-MCNC: 3.3 G/DL (ref 3.4–5)
ALP BLD-CCNC: 130 U/L (ref 40–129)
ALT SERPL-CCNC: 12 U/L (ref 10–40)
ANION GAP SERPL CALCULATED.3IONS-SCNC: 9 MMOL/L (ref 3–16)
AST SERPL-CCNC: 17 U/L (ref 15–37)
BASOPHILS ABSOLUTE: 0 K/UL (ref 0–0.2)
BASOPHILS RELATIVE PERCENT: 0.7 %
BILIRUB SERPL-MCNC: 1.3 MG/DL (ref 0–1)
BUN BLDV-MCNC: 21 MG/DL (ref 7–20)
CALCIUM SERPL-MCNC: 9.5 MG/DL (ref 8.3–10.6)
CHLORIDE BLD-SCNC: 102 MMOL/L (ref 99–110)
CO2: 25 MMOL/L (ref 21–32)
CREAT SERPL-MCNC: 0.8 MG/DL (ref 0.6–1.2)
EOSINOPHILS ABSOLUTE: 0.2 K/UL (ref 0–0.6)
EOSINOPHILS RELATIVE PERCENT: 3.3 %
GFR AFRICAN AMERICAN: >60
GFR NON-AFRICAN AMERICAN: >60
GLUCOSE BLD-MCNC: 101 MG/DL (ref 70–99)
HCT VFR BLD CALC: 40.8 % (ref 36–48)
HEMOGLOBIN: 13.3 G/DL (ref 12–16)
LYMPHOCYTES ABSOLUTE: 1.6 K/UL (ref 1–5.1)
LYMPHOCYTES RELATIVE PERCENT: 25.2 %
MCH RBC QN AUTO: 29.3 PG (ref 26–34)
MCHC RBC AUTO-ENTMCNC: 32.7 G/DL (ref 31–36)
MCV RBC AUTO: 89.7 FL (ref 80–100)
MONOCYTES ABSOLUTE: 0.5 K/UL (ref 0–1.3)
MONOCYTES RELATIVE PERCENT: 7.7 %
NEUTROPHILS ABSOLUTE: 4 K/UL (ref 1.7–7.7)
NEUTROPHILS RELATIVE PERCENT: 63.1 %
PDW BLD-RTO: 15.9 % (ref 12.4–15.4)
PLATELET # BLD: 207 K/UL (ref 135–450)
PMV BLD AUTO: 8.5 FL (ref 5–10.5)
POTASSIUM SERPL-SCNC: 4.3 MMOL/L (ref 3.5–5.1)
RBC # BLD: 4.55 M/UL (ref 4–5.2)
SODIUM BLD-SCNC: 136 MMOL/L (ref 136–145)
TOTAL PROTEIN: 6.8 G/DL (ref 6.4–8.2)
WBC # BLD: 6.3 K/UL (ref 4–11)

## 2022-07-28 PROCEDURE — 6370000000 HC RX 637 (ALT 250 FOR IP): Performed by: INTERNAL MEDICINE

## 2022-07-28 PROCEDURE — 36415 COLL VENOUS BLD VENIPUNCTURE: CPT

## 2022-07-28 PROCEDURE — 6370000000 HC RX 637 (ALT 250 FOR IP): Performed by: NURSE PRACTITIONER

## 2022-07-28 PROCEDURE — 80053 COMPREHEN METABOLIC PANEL: CPT

## 2022-07-28 PROCEDURE — 94760 N-INVAS EAR/PLS OXIMETRY 1: CPT

## 2022-07-28 PROCEDURE — 94640 AIRWAY INHALATION TREATMENT: CPT

## 2022-07-28 PROCEDURE — 85025 COMPLETE CBC W/AUTO DIFF WBC: CPT

## 2022-07-28 PROCEDURE — 2060000000 HC ICU INTERMEDIATE R&B

## 2022-07-28 RX ADMIN — HYDROCODONE BITARTRATE AND ACETAMINOPHEN 1 TABLET: 5; 325 TABLET ORAL at 02:03

## 2022-07-28 RX ADMIN — QUETIAPINE FUMARATE 25 MG: 25 TABLET ORAL at 21:30

## 2022-07-28 RX ADMIN — ATORVASTATIN CALCIUM 80 MG: 80 TABLET, FILM COATED ORAL at 21:30

## 2022-07-28 RX ADMIN — ALBUTEROL SULFATE 2 PUFF: 90 AEROSOL, METERED RESPIRATORY (INHALATION) at 07:34

## 2022-07-28 RX ADMIN — HYDROCODONE BITARTRATE AND ACETAMINOPHEN 1 TABLET: 5; 325 TABLET ORAL at 17:51

## 2022-07-28 RX ADMIN — ASPIRIN 81 MG: 81 TABLET, COATED ORAL at 08:41

## 2022-07-28 RX ADMIN — ALBUTEROL SULFATE 2 PUFF: 90 AEROSOL, METERED RESPIRATORY (INHALATION) at 19:47

## 2022-07-28 RX ADMIN — VALSARTAN 40 MG: 80 TABLET, FILM COATED ORAL at 08:41

## 2022-07-28 ASSESSMENT — PAIN DESCRIPTION - ORIENTATION: ORIENTATION: RIGHT;LEFT

## 2022-07-28 ASSESSMENT — PAIN SCALES - GENERAL
PAINLEVEL_OUTOF10: 8
PAINLEVEL_OUTOF10: 0

## 2022-07-28 ASSESSMENT — PAIN DESCRIPTION - LOCATION: LOCATION: LEG

## 2022-07-28 NOTE — PROGRESS NOTES
Hospitalist Progress Note      PCP: Jennifer Sanchez MD    Date of Admission: 7/22/2022    Subjective: still with hallucinations, redirectable    Medications:  Reviewed    Infusion Medications   Scheduled Medications    [START ON 7/28/2022] metoprolol succinate  25 mg Oral Daily    QUEtiapine  25 mg Oral Nightly    ziprasidone (GEODON) in sterile water injection  10 mg IntraMUSCular Once    HYDROcodone 5 mg - acetaminophen  1 tablet Oral Q8H    valsartan  40 mg Oral Daily    albuterol sulfate HFA  2 puff Inhalation BID    [Held by provider] rivaroxaban  20 mg Oral Dinner    aspirin  81 mg Oral Daily    Or    aspirin  300 mg Rectal Daily    atorvastatin  80 mg Oral Nightly     PRN Meds: QUEtiapine, ziprasidone, calcium carbonate, meclizine, ondansetron **OR** ondansetron, polyethylene glycol, labetalol      Intake/Output Summary (Last 24 hours) at 7/27/2022 2057  Last data filed at 7/27/2022 1322  Gross per 24 hour   Intake 480 ml   Output --   Net 480 ml       Physical Exam Performed:    /76   Pulse 72   Temp 97.6 °F (36.4 °C) (Oral)   Resp 18   Ht 5' 7\" (1.702 m)   Wt 277 lb 1.9 oz (125.7 kg)   SpO2 95%   BMI 43.40 kg/m²          General appearance: NAD  Respiratory:  Normal respiratory effort. Clear to auscultation, bilaterally without accessory muscle use. Cardiovascular:  Regular rate and rhythm without murmurs no lower extremity edema. Abdomen: Soft, obese abdomen, non-tender, non-distended, without rebound or guarding. Normal bowel sounds. Musculoskeletal:  Moves all extremities equally. Full range of motion without deformity. Skin: Skin warm, dry and intact. No rashes or lesions. Neurologic:  Neurovascularly intact without any focal sensory/motor deficits.  Cranial nerves: II-XII intact, grossly non-focal.  Psychiatric:  Alert and oriented, but has hallucinations  Capillary Refill: Brisk,< 3 seconds  Peripheral Pulses: +2 palpable, equal bilaterally       Labs:   Recent Labs 07/25/22  1203   WBC 7.6   HGB 13.5   HCT 40.6        Recent Labs     07/25/22  1203   *   K 4.6      CO2 20*   BUN 16   CREATININE 0.6   CALCIUM 9.5     No results for input(s): AST, ALT, BILIDIR, BILITOT, ALKPHOS in the last 72 hours. Recent Labs     07/27/22  1314   INR 1.33*     No results for input(s): Erika Prasad in the last 72 hours. Urinalysis:      Lab Results   Component Value Date/Time    NITRU Negative 07/25/2022 01:26 PM    WBCUA 4 07/25/2022 01:26 PM    BACTERIA None Seen 07/25/2022 01:26 PM    RBCUA 0 07/25/2022 01:26 PM    BLOODU Negative 07/25/2022 01:26 PM    SPECGRAV 1.005 07/25/2022 01:26 PM    GLUCOSEU Negative 07/25/2022 01:26 PM       Radiology:  CT ABDOMEN PELVIS W IV CONTRAST Additional Contrast? None   Final Result   No CT evidence of acute intra-abdominal pathology. Cholelithiasis without evidence of cholecystitis. Unchanged 1.6 cm right adrenal nodule. Recommend further evaluation with   nonemergent adrenal protocol CT. MRI brain without contrast   Final Result   1. No acute intracranial abnormality. No acute infarct. 2. Mild-to-moderate global parenchymal volume loss with chronic microvascular   ischemic changes. CTA HEAD NECK W CONTRAST   Final Result   No CT evidence of an acute infarct. No flow limiting stenosis or large vessel occlusion detected within the head   or neck. CT HEAD WO CONTRAST   Final Result   No CT evidence of an acute infarct. No flow limiting stenosis or large vessel occlusion detected within the head   or neck. XR CHEST PORTABLE   Final Result   No acute process.       Stable cardiomegaly         IR LUMBAR PUNCTURE FOR DIAGNOSIS    (Results Pending)           Assessment/Plan:    Active Hospital Problems    Diagnosis     Visual hallucinations [R44.1]      Priority: Medium    Permanent atrial fibrillation (HCC) [I48.21]      Priority: Medium    TIA (transient ischemic attack) [G45.9]      Priority: Medium           Possible TIA/CVA  - with symptoms: dizziness/positional vertigo  - admit to OBS to RO TIA/CVA  - out of the tPA window  - head CT neg for acute pathology  - CTA head/neck: No flow limiting stenosis or large vessel occlusion  - MRI neg for acute infarct  - reviewed echo  - started on ASA, statin  - check lipids, HBA1c  - try meclizine     Cardiomyopathy - EF 35-40% on echo and cardiology consulted for recs. Started on valsartan/lasix     Acute metabolic encephalopathy with visual hallucination - re-consulted neurology. Check urine drug screen neg. ?withdrawal. Restarted norco 5/325 for now. Check TSH/vit B12, ? ETOH history. Consulted psych. CT abdomen reviewed with no acute finding. reviewed EEG, planning LP today     Atrial Fibrillation  - currently rate controlled  - continue Xarelto and metoprolol      Essential (primary) hypertension  - monitor blood pressure  - continue home meds     Hyperlipidemia  - continue statin        DVT Prophylaxis: Xarelto(held for LP)  Diet: ADULT DIET;  Regular  Code Status: Full Code    PT/OT Eval Status: ordered    Renu Ferreira MD

## 2022-07-28 NOTE — PLAN OF CARE
Problem: Discharge Planning  Goal: Discharge to home or other facility with appropriate resources  7/28/2022 1325 by Malinda Longo RN  Outcome: Progressing     Problem: Safety - Adult  Goal: Free from fall injury  7/28/2022 1325 by Malinda Longo RN  Outcome: Progressing     Problem: ABCDS Injury Assessment  Goal: Absence of physical injury  7/28/2022 1325 by Malinda Longo RN  Outcome: Progressing     Problem: Confusion  Goal: Confusion, delirium, dementia, or psychosis is improved or at baseline  Description: INTERVENTIONS:  1. Assess for possible contributors to thought disturbance, including medications, impaired vision or hearing, underlying metabolic abnormalities, dehydration, psychiatric diagnoses, and notify attending LIP  2. [place] high risk fall precautions, as indicated  3. Provide frequent short contacts to provide reality reorientation, refocusing and direction  4. Decrease environmental stimuli, including noise as appropriate  5. Monitor and intervene to maintain adequate nutrition, hydration, elimination, sleep and activity  6. If unable to ensure safety without constant attention obtain sitter and review sitter guidelines with assigned personnel  7.  Initiate Psychosocial CNS and Spiritual Care consult, as indicated  7/28/2022 1325 by Malinda Longo RN  Outcome: Progressing

## 2022-07-28 NOTE — PLAN OF CARE
Problem: Discharge Planning  Goal: Discharge to home or other facility with appropriate resources  Outcome: Progressing  Flowsheets (Taken 7/27/2022 2124)  Discharge to home or other facility with appropriate resources:   Identify barriers to discharge with patient and caregiver   Arrange for needed discharge resources and transportation as appropriate   Identify discharge learning needs (meds, wound care, etc)     Problem: Safety - Adult  Goal: Free from fall injury  Outcome: Progressing  Flowsheets (Taken 7/27/2022 0535)  Free From Fall Injury: Instruct family/caregiver on patient safety     Problem: ABCDS Injury Assessment  Goal: Absence of physical injury  Outcome: Progressing  Flowsheets (Taken 7/27/2022 0535)  Absence of Physical Injury: Implement safety measures based on patient assessment     Problem: Confusion  Goal: Confusion, delirium, dementia, or psychosis is improved or at baseline  Description: INTERVENTIONS:  1. Assess for possible contributors to thought disturbance, including medications, impaired vision or hearing, underlying metabolic abnormalities, dehydration, psychiatric diagnoses, and notify attending LIP  2. [place] high risk fall precautions, as indicated  3. Provide frequent short contacts to provide reality reorientation, refocusing and direction  4. Decrease environmental stimuli, including noise as appropriate  5. Monitor and intervene to maintain adequate nutrition, hydration, elimination, sleep and activity  6. If unable to ensure safety without constant attention obtain sitter and review sitter guidelines with assigned personnel  7.  Initiate Psychosocial CNS and Spiritual Care consult, as indicated  Outcome: Progressing  Flowsheets (Taken 7/27/2022 2124)  Effect of thought disturbance (confusion, delirium, dementia, or psychosis) are managed with adequate functional status:   Assess for contributors to thought disturbance, including medications, impaired vision or hearing, underlying metabolic abnormalities, dehydration, psychiatric diagnoses, notify [facility] high risk fall precautions, as indicated   Provide frequent short contacts to provide reality reorientation, refocusing and direction   Decrease environmental stimuli, including noise as appropriate

## 2022-07-28 NOTE — PROGRESS NOTES
Pt to IR for LP. On arrival patient stated multiple times to take her back upstairs. Patient was alert and oriented x4 during assessment. States she wants to talk with her doctor and is refusing to proceed at this time. Patient back to room and this information was provided to RN.      Electronically signed by Estela Coronel RN on 7/28/2022 at 3:44 PM

## 2022-07-28 NOTE — CARE COORDINATION
Spoke with sister Marilu Hernandez. Family 24 hour care arranged at discharge. Family is agreeable to [facility]. Sister reports patient is alert and oriented at this time.       #[phone]  Electronically signed by Leonor Puente RN on 7/28/2022 at 4:18 PM

## 2022-07-29 VITALS
SYSTOLIC BLOOD PRESSURE: 102 MMHG | HEIGHT: 67 IN | WEIGHT: 277.78 LBS | BODY MASS INDEX: 43.6 KG/M2 | RESPIRATION RATE: 16 BRPM | DIASTOLIC BLOOD PRESSURE: 73 MMHG | OXYGEN SATURATION: 94 % | TEMPERATURE: 97.4 F | HEART RATE: 87 BPM

## 2022-07-29 PROCEDURE — 6370000000 HC RX 637 (ALT 250 FOR IP): Performed by: INTERNAL MEDICINE

## 2022-07-29 PROCEDURE — 94760 N-INVAS EAR/PLS OXIMETRY 1: CPT

## 2022-07-29 PROCEDURE — 94640 AIRWAY INHALATION TREATMENT: CPT

## 2022-07-29 RX ORDER — QUETIAPINE FUMARATE 25 MG/1
25 TABLET, FILM COATED ORAL NIGHTLY
Qty: 30 TABLET | Refills: 0 | Status: SHIPPED | OUTPATIENT
Start: 2022-07-29

## 2022-07-29 RX ADMIN — HYDROCODONE BITARTRATE AND ACETAMINOPHEN 1 TABLET: 5; 325 TABLET ORAL at 09:13

## 2022-07-29 RX ADMIN — ASPIRIN 81 MG: 81 TABLET, COATED ORAL at 09:13

## 2022-07-29 RX ADMIN — ALBUTEROL SULFATE 2 PUFF: 90 AEROSOL, METERED RESPIRATORY (INHALATION) at 08:28

## 2022-07-29 RX ADMIN — ANTACID TABLETS 500 MG: 500 TABLET, CHEWABLE ORAL at 08:10

## 2022-07-29 RX ADMIN — HYDROCODONE BITARTRATE AND ACETAMINOPHEN 1 TABLET: 5; 325 TABLET ORAL at 01:47

## 2022-07-29 ASSESSMENT — PAIN SCALES - GENERAL: PAINLEVEL_OUTOF10: 4

## 2022-07-29 ASSESSMENT — PAIN DESCRIPTION - LOCATION: LOCATION: ABDOMEN;THROAT

## 2022-07-29 ASSESSMENT — PAIN DESCRIPTION - DESCRIPTORS: DESCRIPTORS: BURNING

## 2022-07-29 NOTE — PROGRESS NOTES
Hospitalist Progress Note      PCP: Sharad Howard MD    Date of Admission: 7/22/2022    Subjective: pt with improved hallucination spells, redirectable, refused LP today but agreeable to get it done tomorrow    Medications:  Reviewed    Infusion Medications   Scheduled Medications    metoprolol succinate  25 mg Oral Daily    QUEtiapine  25 mg Oral Nightly    ziprasidone (GEODON) in sterile water injection  10 mg IntraMUSCular Once    HYDROcodone 5 mg - acetaminophen  1 tablet Oral Q8H    valsartan  40 mg Oral Daily    albuterol sulfate HFA  2 puff Inhalation BID    [Held by provider] rivaroxaban  20 mg Oral Dinner    aspirin  81 mg Oral Daily    Or    aspirin  300 mg Rectal Daily    atorvastatin  80 mg Oral Nightly     PRN Meds: QUEtiapine, calcium carbonate, meclizine, ondansetron **OR** ondansetron, polyethylene glycol, labetalol      Intake/Output Summary (Last 24 hours) at 7/29/2022 0036  Last data filed at 7/28/2022 1857  Gross per 24 hour   Intake 720 ml   Output 1 ml   Net 719 ml       Physical Exam Performed:    BP (!) 105/59   Pulse 91   Temp 97.4 °F (36.3 °C) (Oral)   Resp 18   Ht 5' 7\" (1.702 m)   Wt 279 lb 5.2 oz (126.7 kg)   SpO2 96%   BMI 43.75 kg/m²     General appearance: NAD  Respiratory:  Normal respiratory effort. Clear to auscultation, bilaterally without accessory muscle use. Cardiovascular:  Regular rate and rhythm without murmurs no lower extremity edema. Abdomen: Soft, obese abdomen, non-tender, non-distended, without rebound or guarding. Normal bowel sounds. Musculoskeletal:  Moves all extremities equally. Full range of motion without deformity. Skin: Skin warm, dry and intact. No rashes or lesions. Neurologic:  Neurovascularly intact without any focal sensory/motor deficits.  Cranial nerves: II-XII intact, grossly non-focal.  Psychiatric:  Alert and oriented, but has hallucinations  Capillary Refill: Brisk,< 3 seconds  Peripheral Pulses: +2 palpable, equal bilaterally       Labs:   Recent Labs     07/28/22  1332   WBC 6.3   HGB 13.3   HCT 40.8        Recent Labs     07/28/22  1332      K 4.3      CO2 25   BUN 21*   CREATININE 0.8   CALCIUM 9.5     Recent Labs     07/28/22  1332   AST 17   ALT 12   BILITOT 1.3*   ALKPHOS 130*     Recent Labs     07/27/22  1314   INR 1.33*     No results for input(s): Ann [place]am[place] in the last 72 hours. Urinalysis:      Lab Results   Component Value Date/Time    NITRU Negative 07/25/2022 01:26 PM    WBCUA 4 07/25/2022 01:26 PM    BACTERIA None Seen 07/25/2022 01:26 PM    RBCUA 0 07/25/2022 01:26 PM    BLOODU Negative 07/25/2022 01:26 PM    SPECGRAV 1.005 07/25/2022 01:26 PM    GLUCOSEU Negative 07/25/2022 01:26 PM       Radiology:  CT ABDOMEN PELVIS W IV CONTRAST Additional Contrast? None   Final Result   No CT evidence of acute intra-abdominal pathology. Cholelithiasis without evidence of cholecystitis. Unchanged 1.6 cm right adrenal nodule. Recommend further evaluation with   nonemergent adrenal protocol CT. MRI brain without contrast   Final Result   1. No acute intracranial abnormality. No acute infarct. 2. Mild-to-moderate global parenchymal volume loss with chronic microvascular   ischemic changes. CTA HEAD NECK W CONTRAST   Final Result   No CT evidence of an acute infarct. No flow limiting stenosis or large vessel occlusion detected within the head   or neck. CT HEAD WO CONTRAST   Final Result   No CT evidence of an acute infarct. No flow limiting stenosis or large vessel occlusion detected within the head   or neck. XR CHEST PORTABLE   Final Result   No acute process.       Stable cardiomegaly         IR LUMBAR PUNCTURE FOR DIAGNOSIS    (Results Pending)           Assessment/Plan:    Active Hospital Problems    Diagnosis     Visual hallucinations [R44.1]      Priority: Medium    Permanent atrial fibrillation ([address] [I48.21]      Priority: Medium TIA (transient ischemic attack) [G45.9]      Priority: Medium           Possible TIA/CVA  - with symptoms: dizziness/positional vertigo  - admit to OBS to RO TIA/CVA  - out of the tPA window  - head CT neg for acute pathology  - CTA head/neck: No flow limiting stenosis or large vessel occlusion  - MRI neg for acute infarct  - reviewed echo  - started on ASA, statin  - check lipids, HBA1c  - try meclizine     Cardiomyopathy - EF 35-40% on echo and cardiology consulted for recs. Started on valsartan/lasix     Acute metabolic encephalopathy with visual hallucination - re-consulted neurology. Check urine drug screen neg. ?withdrawal. Restarted norco 5/325 for now. Check TSH/vit B12, ? ETOH history. Consulted psych. CT abdomen reviewed with no acute finding. reviewed EEG, planning LP today - pt wants to do it tomorrow     Atrial Fibrillation  - currently rate controlled  - continue Xarelto and metoprolol      Essential (primary) hypertension  - monitor blood pressure  - continue home meds     Hyperlipidemia  - continue statin        DVT Prophylaxis: Xarelto(held for LP)  Diet: ADULT DIET;  Regular  Code Status: Full Code    PT/OT Eval Status: ordered, needs home care    Dispo - cont care, discussed on phone with sister Luda Patel - she feels pt is improved    Charlotte Badillo MD

## 2022-07-29 NOTE — CARE COORDINATION
CASE MANAGEMENT DISCHARGE SUMMARY:    DISCHARGE DATE: 7/29/22    DISCHARGED TO: Home with home health care     Discharging to Facility/ Agency   Name: [facility]  Address:   [place] [address].  [facility] [address]  Phone:  [phone]  Fax:  [phone]                  TRANSPORTATION: family to transport             TIME: when ready     PREFERRED PHARMACY: [address] [place] [address]                #[phone]  Electronically signed by Rory Dhaliwal RN on 7/29/2022 at 11:18 AM

## 2022-07-29 NOTE — PLAN OF CARE
Problem: Discharge Planning  Goal: Discharge to home or other facility with appropriate resources  Outcome: Progressing  Flowsheets (Taken 7/28/2022 2011)  Discharge to home or other facility with appropriate resources:   Identify barriers to discharge with patient and caregiver   Arrange for needed discharge resources and transportation as appropriate   Identify discharge learning needs (meds, wound care, etc)     Problem: Safety - Adult  Goal: Free from fall injury  Outcome: Progressing  Flowsheets (Taken 7/29/2022 0724)  Free From Fall Injury: Instruct family/caregiver on patient safety  Note: Bed alarm on, bed in lowest position, wheels locked. Fall risk assessment completed every shift. Nonskid socks on, fall sign posted. Pt educated on use of call light. Problem: ABCDS Injury Assessment  Goal: Absence of physical injury  Outcome: Progressing  Flowsheets (Taken 7/29/2022 0724)  Absence of Physical Injury: Implement safety measures based on patient assessment     Problem: Confusion  Goal: Confusion, delirium, dementia, or psychosis is improved or at baseline  Description: INTERVENTIONS:  1. Assess for possible contributors to thought disturbance, including medications, impaired vision or hearing, underlying metabolic abnormalities, dehydration, psychiatric diagnoses, and notify attending LIP  2. [place] high risk fall precautions, as indicated  3. Provide frequent short contacts to provide reality reorientation, refocusing and direction  4. Decrease environmental stimuli, including noise as appropriate  5. Monitor and intervene to maintain adequate nutrition, hydration, elimination, sleep and activity  6. If unable to ensure safety without constant attention obtain sitter and review sitter guidelines with assigned personnel  7.  Initiate Psychosocial CNS and Spiritual Care consult, as indicated  Outcome: Progressing  Flowsheets (Taken 7/29/2022 0724)  Effect of thought disturbance (confusion, delirium, dementia, or psychosis) are managed with adequate functional status:   Assess for contributors to thought disturbance, including medications, impaired vision or hearing, underlying metabolic abnormalities, dehydration, psychiatric diagnoses, notify [place] high risk fall precautions, as indicated   Provide frequent short contacts to provide reality reorientation, refocusing and direction     Problem: Skin/Tissue Integrity  Goal: Absence of new skin breakdown  Description: 1. Monitor for areas of redness and/or skin breakdown  2. Assess vascular access sites hourly  3. Every 4-6 hours minimum:  Change oxygen saturation probe site  4. Every 4-6 hours:  If on nasal continuous positive airway pressure, respiratory therapy assess nares and determine need for appliance change or resting period. Outcome: Progressing  Note: Skin assessment completed every shift. Pt assessed for incontinence. Pt encouraged to turn/rotate every 2 hours. Assistance provided if pt unable to do so themselves.

## 2022-07-29 NOTE — DISCHARGE INSTR - COC
Continuity of Care Form    Patient Name: Lavelle May   :  1950  MRN:  [id number]    Admit date:  2022  Discharge date:      Code Status Order: Full Code   Advance Directives:     Admitting Physician:  No admitting provider for patient encounter.   PCP: Christopher Painting MD    Discharging Nurse: Sarthak Pereira RN   [address] Unit/Room#: H9J-7341/1306-78  Discharging Unit Phone Number: [phone]    Emergency Contact:   Extended Emergency Contact Information  Primary Emergency Contact: Dasha Reyes  Home Phone: [phone]  Relation: Brother/Sister  Secondary Emergency Contact: [address] Phone: [phone]  Relation: Child    Past Surgical History:  Past Surgical History:   Procedure Laterality Date    CARDIAC CATHETERIZATION       SECTION      HERNIA REPAIR         Immunization History:   Immunization History   Administered Date(s) Administered    COVID-19, PFIZER PURPLE top, DILUTE for use, (age 15 y+), 30mcg/0.3mL 2021, 09/15/2021    Influenza Vaccine, unspecified formulation 10/30/2016    Influenza Virus Vaccine 10/04/2012, 2016    Influenza Whole 10/03/2013, 10/02/2014, 2015    Pneumococcal Conjugate Vaccine 2017    Tdap (Boostrix, Adacel) 2012       Active Problems:  Patient Active Problem List   Diagnosis Code    Atrial fibrillation, rapid ([facility] I48.91    Essential hypertension I10    Hyperlipidemia E78.5    Morbid obesity with BMI of 40.0-44.9, adult ([facility] E66.01, Z68.41    [place] R06.83    Cardiomyopathy ([facility] I42.9    TIA (transient ischemic attack) G45.9    Permanent atrial fibrillation ([facility] I48.21    Visual hallucinations R44.1       Isolation/Infection:   Isolation            No Isolation          Patient Infection Status       Infection Onset Added Last Indicated Last Indicated By Review Planned Expiration Resolved Resolved By    None active    Resolved    COVID-19 (Rule Out) 22 COVID-19, Rapid (Ordered)   22 Rule-Out Test Resulted    COVID-19 06/15/22 06/15/22 06/15/22 COVID-19, Rapid   22     COVID-19 (Rule Out) 06/15/22 06/15/22 06/15/22 COVID-19, Rapid (Ordered)   06/15/22 Rule-Out Test Resulted            Nurse Assessment:  Last Vital Signs: /73   Pulse 87   Temp 97.4 °F (36.3 °C) (Axillary)   Resp 16   Ht 5' 7\" (1.702 m)   Wt 277 lb 12.5 oz (126 kg)   SpO2 94%   BMI 43.51 kg/m²     Last documented pain score (0-10 scale): Pain Level: 4 (acid reflux)  Last Weight:   Wt Readings from Last 1 Encounters:   22 277 lb 12.5 oz (126 kg)     Mental Status:  oriented and alert    IV Access:  - None    Nursing Mobility/ADLs:  Walking   Independent  Transfer  Independent  Bathing  Assisted  Dressing  Assisted  Toileting  Independent  Feeding  [address]  Assisted  Med Delivery   whole    Wound Care Documentation and Therapy:        Elimination:  Continence: Bowel: Yes  Bladder: Yes  Urinary Catheter: None   Colostomy/Ileostomy/Ileal Conduit: No       Date of Last BM:       Intake/Output Summary (Last 24 hours) at 2022 1112  Last data filed at 2022 0855  Gross per 24 hour   Intake 960 ml   Output --   Net 960 ml     I/O last 3 completed shifts: In: 5 [P.O.:720]  Out: 1 [Urine:1]    Safety Concerns: At Risk for Falls    Impairments/Disabilities:      None    Nutrition Therapy:  Current Nutrition Therapy:   - Oral Diet:  General    Routes of Feeding: Oral  Liquids: No Restrictions  Daily Fluid Restriction: no  Last Modified Barium Swallow with Video (Video Swallowing Test): not done    Treatments at the Time of Hospital Discharge:   Respiratory Treatments:     Oxygen Therapy:  is not on home oxygen therapy.   Ventilator:    - No ventilator support    Rehab Therapies: Physical Therapy and Occupational Therapy  Weight Bearing Status/Restrictions: No weight bearing restrictions  Other Medical Equipment (for information only, NOT a DME order): walker  Other Treatments:         Patient's personal belongings (please select all that are sent with patient):  None all belongings sent with patient     RN SIGNATURE:  Electronically signed by Quintin Acuña RN on 7/29/22 at 11:16 AM EDT    CASE MANAGEMENT/SOCIAL WORK SECTION    Inpatient Status Date: 7/22/22    Readmission Risk Assessment Score:  Readmission Risk              Risk of Unplanned Readmission:  [id number]           Discharging to Facility/ Agency   Name: [facility]  Address:   [place] [address]  Phone:  [phone]  Fax:  [phone]     / signature: Electronically signed by Mando Pelayo RN on 7/29/22 at 11:17 AM EDT    PHYSICIAN SECTION    Prognosis: Fair    Condition at Discharge: Stable    Rehab Potential (if transferring to Rehab): Fair    Recommended Labs or Other Treatments After Discharge: NA    Physician Certification: I certify the above information and transfer of Em Bowser  is necessary for the continuing treatment of the diagnosis listed and that she requires Home Care for less 30 days.      Update Admission H&P: Changes in H&P as follows - refer to dc summary    PHYSICIAN SIGNATURE:  Electronically signed by Marita Jackson MD on 7/29/22 at 11:35 AM EDT

## 2022-07-29 NOTE — PROGRESS NOTES
Pt. Discharged to home with Home Care. Sister to transport pt. All belongings sent home. IV removed without complications. Discharge documentation and education complete.

## 2022-08-15 ENCOUNTER — OFFICE VISIT (OUTPATIENT)
Dept: CARDIOLOGY CLINIC | Age: 72
End: 2022-08-15
Payer: MEDICARE

## 2022-08-15 ENCOUNTER — TELEPHONE (OUTPATIENT)
Dept: CARDIOLOGY CLINIC | Age: 72
End: 2022-08-15

## 2022-08-15 VITALS — HEIGHT: 67 IN | WEIGHT: 278 LBS | HEART RATE: 72 BPM | OXYGEN SATURATION: 95 % | BODY MASS INDEX: 43.63 KG/M2

## 2022-08-15 DIAGNOSIS — I10 ESSENTIAL HYPERTENSION: ICD-10-CM

## 2022-08-15 DIAGNOSIS — I50.22 CHRONIC SYSTOLIC HEART FAILURE (HCC): ICD-10-CM

## 2022-08-15 DIAGNOSIS — R29.818 SUSPECTED SLEEP APNEA: ICD-10-CM

## 2022-08-15 DIAGNOSIS — I48.21 PERMANENT ATRIAL FIBRILLATION (HCC): Primary | ICD-10-CM

## 2022-08-15 DIAGNOSIS — Z86.73 HISTORY OF TIA (TRANSIENT ISCHEMIC ATTACK): ICD-10-CM

## 2022-08-15 DIAGNOSIS — E78.2 MIXED HYPERLIPIDEMIA: ICD-10-CM

## 2022-08-15 DIAGNOSIS — E66.01 MORBID OBESITY (HCC): ICD-10-CM

## 2022-08-15 DIAGNOSIS — R06.09 DOE (DYSPNEA ON EXERTION): ICD-10-CM

## 2022-08-15 PROCEDURE — G8417 CALC BMI ABV UP PARAM F/U: HCPCS | Performed by: INTERNAL MEDICINE

## 2022-08-15 PROCEDURE — 3017F COLORECTAL CA SCREEN DOC REV: CPT | Performed by: INTERNAL MEDICINE

## 2022-08-15 PROCEDURE — 1123F ACP DISCUSS/DSCN MKR DOCD: CPT | Performed by: INTERNAL MEDICINE

## 2022-08-15 PROCEDURE — G8400 PT W/DXA NO RESULTS DOC: HCPCS | Performed by: INTERNAL MEDICINE

## 2022-08-15 PROCEDURE — 99214 OFFICE O/P EST MOD 30 MIN: CPT | Performed by: INTERNAL MEDICINE

## 2022-08-15 PROCEDURE — G8427 DOCREV CUR MEDS BY ELIG CLIN: HCPCS | Performed by: INTERNAL MEDICINE

## 2022-08-15 PROCEDURE — 1036F TOBACCO NON-USER: CPT | Performed by: INTERNAL MEDICINE

## 2022-08-15 PROCEDURE — 1111F DSCHRG MED/CURRENT MED MERGE: CPT | Performed by: INTERNAL MEDICINE

## 2022-08-15 PROCEDURE — 93000 ELECTROCARDIOGRAM COMPLETE: CPT | Performed by: INTERNAL MEDICINE

## 2022-08-15 PROCEDURE — 1090F PRES/ABSN URINE INCON ASSESS: CPT | Performed by: INTERNAL MEDICINE

## 2022-08-15 RX ORDER — HYDROCODONE BITARTRATE AND ACETAMINOPHEN 7.5; 325 MG/1; MG/1
TABLET ORAL
COMMUNITY
Start: 2022-07-27

## 2022-08-15 NOTE — TELEPHONE ENCOUNTER
Patient was seen in office today and will need scheduled for a right and left heart cath with Dr. Nila Duque. Please call to facilitate scheduling. The morning of your procedure you will park in the hospital parking lot and report directly to the cath lab to check in.     Pre-Procedure Instructions   You will need to fast for at least 8 hours prior to procedure. No caffeine the morning of. You will need to hold your anticoagulation, Xarelto for 2 days prior. All other medications can be taken in the morning with sips of water. You will need to take 325 mg aspirin the morning of. If you are currently taking 81 mg please take 4 tablets that morning. Do not use any lotions, creams or perfume the morning of procedure. Pre-procedure lab work will need to be completed 5-7 days prior to procedure. Please have a responsible adult to drive you home after procedure. We advise you have someone to stay with you for 24 hours following procedure for precautionary measures. Depending on procedure you may require an overnight stay. Cath lab will provide you with all post procedure instructions. If you have any questions regarding the procedure itself or medications, please call [phone] and ask to speak with a nurse.

## 2022-08-15 NOTE — PROGRESS NOTES
Cardiology Consultation     Milli Fitzpatrick  1950    PCP: Duarte Urban MD  Reason for Referral: PAF, CHF   Chief Complaint:   Chief Complaint   Patient presents with    Follow-Up from [facility] f/u x TIA/ cardiomyopathy     Subjective:     History of Present Illness: The patient is 73 y.o. female with a past medical history significant for systolic heart failure, atrial fibrillation, hypertension, hyperlipidemia, and TIA who presents today for a follow up. She was admitted with dizziness and vertigo 2022. Head CT and MRI showed nothing acute but her echocardiogram showed a reduced EF at 35%. She did not routinely follow with a cardiologist and prior echo from 2017 showed a reduced EF. She is accompanied by her sister. She reports chronic WHITFIELD and typically will sleep with 3 pillows. She states she is active but states the dyspnea is limiting her activity. She utilizes a walker with ambulation and denies any recent falls. She is unaware of her Afib diagnosis and seemingly unaware of the arrhythmia. She reports compliance with her medications and tolerating. She denies any abnormal bleeding or bruising. Patient denies exertional chest pain/pressure, dyspnea at rest, PND, orthopnea, palpitations, lightheadedness, weight changes, changes in LE edema, and syncope.     Past Medical History:   Diagnosis Date    Arthritis     Atrial fibrillation (HCC)     CHF (congestive heart failure) ([address]     Depression     Hyperlipidemia     Hypertension      Past Surgical History:   Procedure Laterality Date    CARDIAC CATHETERIZATION  2012     SECTION      HERNIA REPAIR       Family History   Problem Relation Age of Onset    Diabetes Mother     High Cholesterol Father     High Blood Pressure Father     Diabetes Maternal Uncle     Diabetes Maternal Grandmother      Social History     Tobacco Use    Smoking status: Never    Smokeless tobacco: Never   Substance Use Topics    Alcohol use: No    Drug use: No       Allergies   Allergen Reactions    Pcn [Penicillins]      Current Outpatient Medications   Medication Sig Dispense Refill    HYDROcodone-acetaminophen (NORCO) 7.5-325 MG per tablet TAKE 1 TABLET BY MOUTH EVERY 6 HOURS AS NEEDED      sacubitril-valsartan (ENTRESTO) 24-26 MG per tablet Take 1 tablet by mouth in the morning and 1 tablet before bedtime. 60 tablet 5    QUEtiapine (SEROQUEL) 25 MG tablet Take 1 tablet by mouth nightly 30 tablet 0    metoprolol succinate (TOPROL XL) 50 MG extended release tablet Take 0.5 tablets by mouth in the morning. 30 tablet 3    diclofenac sodium (VOLTAREN) 1 % GEL Apply topically 4 times daily as needed for Pain      ondansetron (ZOFRAN ODT) 4 MG disintegrating tablet Take 1 tablet by mouth every 8 hours as needed for Nausea 20 tablet 0    rivaroxaban (XARELTO) 20 MG TABS tablet Take 1 tablet by mouth daily 30 tablet 3    simvastatin (ZOCOR) 20 MG tablet ONE PILL AT BEDTIME       No current facility-administered medications for this visit. Review of Systems:  Constitutional: No unanticipated weight loss. There's been no change in energy level, sleep pattern, or activity level. No fevers, chills. Eyes: No visual changes or diplopia. No scleral icterus. ENT: No Headaches, hearing loss or vertigo. No mouth sores or sore throat. Cardiovascular: as reviewed in HPI  Respiratory: No cough or wheezing, no sputum production. No hemoptysis. Gastrointestinal: No abdominal pain, appetite loss, blood in stools. No change in bowel or bladder habits. Genitourinary: No dysuria, trouble voiding, or hematuria. Musculoskeletal:  No gait disturbance, no joint complaints. Integumentary: No rash or pruritis. Neurological: No headache, diplopia, change in muscle strength, numbness or tingling. Psychiatric: No anxiety or depression. Endocrine: No temperature intolerance. No excessive thirst, fluid intake, or urination. No tremor.   Hematologic/Lymphatic: No abnormal bruising or bleeding, blood clots or swollen lymph nodes. Allergic/Immunologic: No nasal congestion or hives. Physical Exam:   Pulse 72   Ht 5' 7\" (1.702 m)   Wt 278 lb (126.1 kg)   SpO2 95%   BMI 43.54 kg/m²   Wt Readings from Last 3 Encounters:   08/15/22 278 lb (126.1 kg)   07/29/22 277 lb 12.5 oz (126 kg)   06/15/22 292 lb 15.9 oz (132.9 kg)     Constitutional: She is oriented to person, place, and time. She appears well-developed and well-nourished. In no acute distress. Obese, rolling walker. Head: Normocephalic and atraumatic. Pupils equal and round. Neck: Neck supple. No JVP or carotid bruit appreciated. No mass and no thyromegaly present. No lymphadenopathy present. Cardiovascular: Normal rate. Normal heart sounds. Exam reveals no gallop and no friction rub. No murmur heard. Pulmonary/Chest: Effort normal and breath sounds normal. No respiratory distress. She has no wheezes, rhonchi or rales. Abdominal: Soft, non-tender. Bowel sounds are normal. She exhibits no organomegaly, mass or bruit. Extremities: 2+ BLE edema. No cyanosis or clubbing. Pulses are 2+ radial/carotid bilaterally. Neurological: No gross cranial nerve deficit. Coordination normal.   Skin: Skin is warm and dry. There is no rash or diaphoresis. Psychiatric: She has a normal mood and affect. Her speech is normal and behavior is normal.     Lab Review:   FLP:    Lab Results   Component Value Date/Time    TRIG 96 07/23/2022 03:36 AM    HDL 55 07/23/2022 03:36 AM    HDL 57 01/18/2011 11:50 AM    LDLCALC 67 07/23/2022 03:36 AM    LABVLDL 19 07/23/2022 03:36 AM     BUN/Creatinine:    Lab Results   Component Value Date/Time    BUN 21 07/28/2022 01:32 PM    CREATININE 0.8 07/28/2022 01:32 PM     PT/INR, TNI, HGB A1C:   Lab Results   Component Value Date/Time    TROPONINI <0.01 07/22/2022 02:54 PM    LABA1C 5.7 07/23/2022 03:36 AM      No results found for: CBCAUTODIF    EKG Interpretation: 8/15/22 atrial fibrillation. procedure     Thank you very much for allowing me to participate in the care of your patient. Please do not hesitate to contact me if you have any questions. Reilly Dominguez MD [facility], Interventional Cardiology, and Peripheral Vascular Disease   [facility]   Ph: [phone]  Fax: [phone]      This note was scribed in the presence of Dr Mira Colorado MD by Di Rose RN. Physician Attestation:  The scribes documentation has been prepared under my direction and personally reviewed by me in its entirety. I confirm the note above accurately reflects all work, treatment, procedures, and medical decision making performed by me.     Electronically signed by Valarie Jane MD on 8/30/2022 at 9:12 AM

## 2022-08-19 NOTE — TELEPHONE ENCOUNTER
Spoke to pt this morning and she asked that I called [employer], her sister. LVM with [employer] to return my call.  Insurance authorization number is [id number]

## 2022-08-23 NOTE — TELEPHONE ENCOUNTER
Date and time not available.  Called and left Cheryle Overland a message to offer 9/23/22 at 9am with an arrival time of 7am.

## 2022-08-23 NOTE — TELEPHONE ENCOUNTER
Simin Cahnce, procedure scheduled for 9/16/22 at 9:30 patient to arrive at 7am.     Reviewed all instructions. Published on [employer] and e-mail to Casandra Talley

## 2022-09-08 NOTE — DISCHARGE SUMMARY
Hospital Medicine Discharge Summary    Patient ID: Em Bowser      Patient's PCP: Tarry Buerger, MD    Admit Date: 7/22/2022     Discharge Date: 7/29/2022      Admitting Provider: Loraine Abrams MD     Discharge Provider: Marita Jackson MD     Discharge Diagnoses: Active Hospital Problems    Diagnosis     Visual hallucinations [R44.1]      Priority: Medium    Permanent atrial fibrillation (HCC) [I48.21]      Priority: Medium    TIA (transient ischemic attack) [G45.9]      Priority: Medium       The patient was seen and examined on day of discharge and this discharge summary is in conjunction with any daily progress note from day of discharge. Hospital Course:   73 y.o. female with PMHx of A-fib, HTN and HLD presented to [facility] in transfer from [address] [place] [address] emergency department for evaluation of TIA. Patient reports 2-day history of dizziness. Patient reports dizziness with standing. Describes this as a room spinning. No difficulty with speech or swallowing. No unilateral weakness. Denies headache or neck pain. Possible TIA/CVA  - with symptoms: dizziness/positional vertigo  - admit to [facility] to RO TIA/CVA  - out of the tPA window  - head CT neg for acute pathology  - CTA head/neck: No flow limiting stenosis or large vessel occlusion  - MRI neg for acute infarct  - reviewed echo  - started on ASA, statin  - check lipids, HBA1c  - try meclizine     Cardiomyopathy - EF 35-40% on echo and cardiology consulted for recs. Started on valsartan/lasix     Acute metabolic encephalopathy with visual hallucination - re-consulted neurology. Check urine drug screen neg. ?withdrawal. Restarted norco 5/325 for now. Check TSH/vit B12, ? ETOH history. Consulted psych. CT abdomen reviewed with no acute finding.  reviewed EEG, planning LP today - pt wants to do it tomorrow     Atrial Fibrillation  - currently rate controlled  - continue Xarelto and metoprolol      Essential (primary) hypertension  - monitor blood pressure  - continue home meds     Hyperlipidemia  - continue statin      Physical Exam Performed:     /73   Pulse 87   Temp 97.4 °F (36.3 °C) (Axillary)   Resp 16   Ht 5' 7\" (1.702 m)   Wt 277 lb 12.5 oz (126 kg)   SpO2 94%   BMI 43.51 kg/m²        General appearance: NAD  Respiratory:  Normal respiratory effort. Clear to auscultation, bilaterally without accessory muscle use. Cardiovascular:  Regular rate and rhythm without murmurs no lower extremity edema. Abdomen: Soft, obese abdomen, non-tender, non-distended, without rebound or guarding. Normal bowel sounds. Musculoskeletal:  Moves all extremities equally. Full range of motion without deformity. Skin: Skin warm, dry and intact. No rashes or lesions. Neurologic:  Neurovascularly intact without any focal sensory/motor deficits. Cranial nerves: II-XII intact, grossly non-focal.  Psychiatric:  Alert and oriented, but has hallucinations  Capillary Refill: Brisk,< 3 seconds  Peripheral Pulses: +2 palpable, equal bilaterally       Labs: For convenience and continuity at follow-up the following most recent labs are provided:      CBC:    Lab Results   Component Value Date/Time    WBC 6.3 07/28/2022 01:32 PM    HGB 13.3 07/28/2022 01:32 PM    HCT 40.8 07/28/2022 01:32 PM     07/28/2022 01:32 PM       Renal:    Lab Results   Component Value Date/Time     07/28/2022 01:32 PM    K 4.3 07/28/2022 01:32 PM    K 4.4 06/15/2022 01:43 PM     07/28/2022 01:32 PM    CO2 25 07/28/2022 01:32 PM    BUN 21 07/28/2022 01:32 PM    CREATININE 0.8 07/28/2022 01:32 PM    CALCIUM 9.5 07/28/2022 01:32 PM         Significant Diagnostic Studies    Radiology:   CT ABDOMEN PELVIS W IV CONTRAST Additional Contrast? None   Final Result   No CT evidence of acute intra-abdominal pathology. Cholelithiasis without evidence of cholecystitis. Unchanged 1.6 cm right adrenal nodule.   Recommend further evaluation with nonemergent adrenal protocol CT. MRI brain without contrast   Final Result   1. No acute intracranial abnormality. No acute infarct. 2. Mild-to-moderate global parenchymal volume loss with chronic microvascular   ischemic changes. CTA HEAD NECK W CONTRAST   Final Result   No CT evidence of an acute infarct. No flow limiting stenosis or large vessel occlusion detected within the head   or neck. CT HEAD WO CONTRAST   Final Result   No CT evidence of an acute infarct. No flow limiting stenosis or large vessel occlusion detected within the head   or neck. XR CHEST PORTABLE   Final Result   No acute process.       Stable cardiomegaly         IR LUMBAR PUNCTURE FOR DIAGNOSIS    (Results Pending)          Consults:     IP CONSULT TO CARDIOLOGY  IP CONSULT TO NEUROLOGY  IP CONSULT TO PSYCHIATRY  IP CONSULT TO HOME CARE NEEDS    Disposition:  home     Condition at Discharge: Stable    Discharge Instructions/Follow-up:  pcp in 1 week    Code Status:  Prior     Activity: activity as tolerated    Diet: regular diet      Discharge Medications:     Discharge Medication List as of 7/29/2022 11:37 AM             Details   QUEtiapine (SEROQUEL) 25 MG tablet Take 1 tablet by mouth nightly, Disp-30 tablet, R-0Normal      valsartan (DIOVAN) 40 MG tablet Take 1 tablet by mouth in the morning., Disp-30 tablet, R-3Normal                Details   metoprolol succinate (TOPROL XL) 50 MG extended release tablet Take 0.5 tablets by mouth in the morning., Disp-30 tablet, R-3Normal                Details   diclofenac sodium (VOLTAREN) 1 % GEL Apply topically 4 times daily as needed for Pain, Topical, 4 TIMES DAILY PRN, Historical Med      ondansetron (ZOFRAN ODT) 4 MG disintegrating tablet Take 1 tablet by mouth every 8 hours as needed for Nausea, Disp-20 tablet, R-0Normal      rivaroxaban (XARELTO) 20 MG TABS tablet Take 1 tablet by mouth daily, Disp-30 tablet, R-3      simvastatin (ZOCOR) 20 MG tablet ONE PILL AT [address]             Time Spent on discharge is more than 30 minutes in the examination, evaluation, counseling and review of medications and discharge plan. Signed:    Pooja Villasenor MD   9/7/2022      Thank you Sharad Howard MD for the opportunity to be involved in this patient's care. If you have any questions or concerns, please feel free to contact me at [phone].

## 2022-09-16 ENCOUNTER — HOSPITAL ENCOUNTER (OUTPATIENT)
Age: 72
Discharge: HOME | End: 2022-09-16
Payer: MEDICARE

## 2022-09-16 DIAGNOSIS — R06.09 DOE (DYSPNEA ON EXERTION): ICD-10-CM

## 2022-09-16 DIAGNOSIS — I50.22 CHRONIC SYSTOLIC HEART FAILURE (HCC): ICD-10-CM

## 2022-09-16 LAB
ANION GAP SERPL CALCULATED.3IONS-SCNC: 12 MMOL/L (ref 3–16)
BUN BLDV-MCNC: 19 MG/DL (ref 7–20)
CALCIUM SERPL-MCNC: 9.3 MG/DL (ref 8.3–10.6)
CHLORIDE BLD-SCNC: 105 MMOL/L (ref 99–110)
CO2: 22 MMOL/L (ref 21–32)
CREAT SERPL-MCNC: 0.7 MG/DL (ref 0.6–1.2)
GFR AFRICAN AMERICAN: >60
GFR NON-AFRICAN AMERICAN: >60
GLUCOSE BLD-MCNC: 92 MG/DL (ref 70–99)
HCT VFR BLD CALC: 41.1 % (ref 36–48)
HEMOGLOBIN: 13.5 G/DL (ref 12–16)
MCH RBC QN AUTO: 29.4 PG (ref 26–34)
MCHC RBC AUTO-ENTMCNC: 32.8 G/DL (ref 31–36)
MCV RBC AUTO: 89.8 FL (ref 80–100)
PDW BLD-RTO: 16 % (ref 12.4–15.4)
PLATELET # BLD: 206 K/UL (ref 135–450)
PMV BLD AUTO: 8 FL (ref 5–10.5)
POTASSIUM SERPL-SCNC: 4.9 MMOL/L (ref 3.5–5.1)
RBC # BLD: 4.58 M/UL (ref 4–5.2)
SODIUM BLD-SCNC: 139 MMOL/L (ref 136–145)
WBC # BLD: 7.3 K/UL (ref 4–11)

## 2022-09-16 PROCEDURE — 85027 COMPLETE CBC AUTOMATED: CPT

## 2022-09-16 PROCEDURE — 36415 COLL VENOUS BLD VENIPUNCTURE: CPT

## 2022-09-16 PROCEDURE — 80048 BASIC METABOLIC PNL TOTAL CA: CPT

## 2022-09-22 ENCOUNTER — HOSPITAL ENCOUNTER (OUTPATIENT)
Dept: CARDIAC CATH/INVASIVE PROCEDURES | Age: 72
Discharge: HOME | End: 2022-09-22
Payer: MEDICARE

## 2022-09-22 VITALS
SYSTOLIC BLOOD PRESSURE: 136 MMHG | RESPIRATION RATE: 12 BRPM | OXYGEN SATURATION: 97 % | DIASTOLIC BLOOD PRESSURE: 95 MMHG | TEMPERATURE: 97.2 F | HEART RATE: 78 BPM

## 2022-09-22 LAB
EKG ATRIAL RATE: 100 BPM
EKG DIAGNOSIS: NORMAL
EKG Q-T INTERVAL: 338 MS
EKG QRS DURATION: 136 MS
EKG QTC CALCULATION (BAZETT): 433 MS
EKG R AXIS: -27 DEGREES
EKG T AXIS: 106 DEGREES
EKG VENTRICULAR RATE: 99 BPM

## 2022-09-22 PROCEDURE — 2500000003 HC RX 250 WO HCPCS

## 2022-09-22 PROCEDURE — C1894 INTRO/SHEATH, NON-LASER: HCPCS

## 2022-09-22 PROCEDURE — 99152 MOD SED SAME PHYS/QHP 5/>YRS: CPT

## 2022-09-22 PROCEDURE — C1769 GUIDE WIRE: HCPCS

## 2022-09-22 PROCEDURE — 93460 R&L HRT ART/VENTRICLE ANGIO: CPT

## 2022-09-22 PROCEDURE — 2709999900 HC NON-CHARGEABLE SUPPLY

## 2022-09-22 PROCEDURE — 93456 R HRT CORONARY ARTERY ANGIO: CPT | Performed by: INTERNAL MEDICINE

## 2022-09-22 PROCEDURE — 6360000002 HC RX W HCPCS

## 2022-09-22 PROCEDURE — 6370000000 HC RX 637 (ALT 250 FOR IP)

## 2022-09-22 PROCEDURE — 6360000004 HC RX CONTRAST MEDICATION: Performed by: INTERNAL MEDICINE

## 2022-09-22 PROCEDURE — 93005 ELECTROCARDIOGRAM TRACING: CPT | Performed by: INTERNAL MEDICINE

## 2022-09-22 PROCEDURE — 93010 ELECTROCARDIOGRAM REPORT: CPT | Performed by: INTERNAL MEDICINE

## 2022-09-22 PROCEDURE — C1751 CATH, INF, PER/CENT/MIDLINE: HCPCS

## 2022-09-22 PROCEDURE — 2580000003 HC RX 258

## 2022-09-22 RX ORDER — SODIUM CHLORIDE 0.9 % (FLUSH) 0.9 %
5-40 SYRINGE (ML) INJECTION EVERY 12 HOURS SCHEDULED
Status: DISCONTINUED | OUTPATIENT
Start: 2022-09-22 | End: 2022-09-23 | Stop reason: HOSPADM

## 2022-09-22 RX ORDER — SODIUM CHLORIDE 0.9 % (FLUSH) 0.9 %
5-40 SYRINGE (ML) INJECTION PRN
Status: DISCONTINUED | OUTPATIENT
Start: 2022-09-22 | End: 2022-09-23 | Stop reason: HOSPADM

## 2022-09-22 RX ORDER — SODIUM CHLORIDE 9 MG/ML
INJECTION, SOLUTION INTRAVENOUS PRN
Status: DISCONTINUED | OUTPATIENT
Start: 2022-09-22 | End: 2022-09-23 | Stop reason: HOSPADM

## 2022-09-22 RX ORDER — FUROSEMIDE 40 MG/1
20 TABLET ORAL DAILY
Qty: 1.5 TABLET | Refills: 0 | Status: SHIPPED | OUTPATIENT
Start: 2022-09-22 | End: 2022-10-24

## 2022-09-22 RX ORDER — ACETAMINOPHEN 325 MG/1
650 TABLET ORAL EVERY 4 HOURS PRN
Status: DISCONTINUED | OUTPATIENT
Start: 2022-09-22 | End: 2022-09-23 | Stop reason: HOSPADM

## 2022-09-22 RX ORDER — ASPIRIN 325 MG
325 TABLET ORAL ONCE
Status: DISCONTINUED | OUTPATIENT
Start: 2022-09-22 | End: 2022-09-23 | Stop reason: HOSPADM

## 2022-09-22 RX ORDER — FUROSEMIDE 40 MG/1
20 TABLET ORAL DAILY
Qty: 60 TABLET | Refills: 3 | Status: SHIPPED | OUTPATIENT
Start: 2022-09-22 | End: 2022-09-22 | Stop reason: SDUPTHER

## 2022-09-22 RX ADMIN — IOPAMIDOL 50 ML: 755 INJECTION, SOLUTION INTRAVENOUS at 09:40

## 2022-09-22 NOTE — DISCHARGE INSTRUCTIONS
RIGHT HEART CATHETERIZATION THROUGH ARM    Care of your puncture site:  Remove bandage 24 hours after the procedure. May shower in 24 hours. Gently clean arm using soap and water. Dry thoroughly and apply a Band-Aid that covers the entire site. Use Band-Aid until skin heals over in about 3-5 days. Do not apply powder or lotion. Limit using the arm for 24 hours    Normal Observations:  Soreness or tenderness which may last one week. Mild oozing from the incision site. Possible bruising that could last 2 weeks. Call your doctor immediately if your condition worsens, for any other concerns, for a follow-up appointment or if you experience any of the following:  Increased swelling on the arm  Unusual pain, numbness, or tingling of the arm  Any signs of infection such as: redness, yellow drainage at the site, swelling or pain. IF ARM STARTS BLEEDING SIGNIFICANTLY:   HOLD FIRM DIRECT PRESSURE, AND CALL YOUR DOCTOR               CARDIAC ANGIOGRAM (LEFT HEART CATH) - RADIAL ACCESS    Care of your puncture site:  Remove clear bandage 24 hours after the procedure. May shower in 24 hours  Inspect the site daily and gently clean using soap and water. Dry thoroughly and apply a Band-Aid. Normal Observations:  Soreness or tenderness which may last one week. Mild oozing from the incision site. Possible bruising that could last 2 weeks. Activity:  You may resume driving 24 hours following the procedure. You may resume normal activity in 3 days or after the wound heals. Avoid lifting more than 10 pounds for 3 days with affected arm. Do not make important / legal decisions within 24 hours after procedure. Nutrition:  Regular diet or as directed by your doctor. Drink at least 8 to 10 glasses of decaffeinated, non-alcoholic fluid for the next 24 hours to flush the x-ray dye used for your angiogram out of your body.     Call your doctor immediately if your condition worsens, for any other concerns, for a follow-up appointment or if you experience any of the following:  Significant bleeding that does not stop after 10 minutes of applying firm pressure on the puncture site. Increased swelling of the wrist.  Unusual pain, numbness, or tingling of the wrist/arm. Any signs of infection such as: redness, yellow drainage at the site, swelling or pain. IF experiencing any recurrent chest pain, arm or jaw pain, shortness of breath,sweating,nausea & vomiting, lighteheadedness or sudden weak. Other Instructions:  Hold Metformin or Metformin containing drugs for 48 hours after procedure if applicable.

## 2022-10-24 ENCOUNTER — OFFICE VISIT (OUTPATIENT)
Dept: CARDIOLOGY CLINIC | Age: 72
End: 2022-10-24
Payer: MEDICARE

## 2022-10-24 ENCOUNTER — HOSPITAL ENCOUNTER (OUTPATIENT)
Dept: VASCULAR LAB | Age: 72
Discharge: HOME | End: 2022-10-24
Payer: MEDICARE

## 2022-10-24 VITALS
HEART RATE: 73 BPM | OXYGEN SATURATION: 97 % | SYSTOLIC BLOOD PRESSURE: 124 MMHG | WEIGHT: 279 LBS | DIASTOLIC BLOOD PRESSURE: 82 MMHG | BODY MASS INDEX: 43.79 KG/M2 | HEIGHT: 67 IN

## 2022-10-24 DIAGNOSIS — M79.601 RIGHT ARM PAIN: ICD-10-CM

## 2022-10-24 DIAGNOSIS — E66.01 MORBID OBESITY (HCC): ICD-10-CM

## 2022-10-24 DIAGNOSIS — Z86.73 HISTORY OF TIA (TRANSIENT ISCHEMIC ATTACK): ICD-10-CM

## 2022-10-24 DIAGNOSIS — E78.2 MIXED HYPERLIPIDEMIA: ICD-10-CM

## 2022-10-24 DIAGNOSIS — I50.22 CHRONIC SYSTOLIC HEART FAILURE (HCC): ICD-10-CM

## 2022-10-24 DIAGNOSIS — I10 ESSENTIAL HYPERTENSION: ICD-10-CM

## 2022-10-24 DIAGNOSIS — R35.0 URINARY FREQUENCY: ICD-10-CM

## 2022-10-24 DIAGNOSIS — I48.21 PERMANENT ATRIAL FIBRILLATION (HCC): Primary | ICD-10-CM

## 2022-10-24 PROCEDURE — 1123F ACP DISCUSS/DSCN MKR DOCD: CPT | Performed by: INTERNAL MEDICINE

## 2022-10-24 PROCEDURE — G8484 FLU IMMUNIZE NO ADMIN: HCPCS | Performed by: INTERNAL MEDICINE

## 2022-10-24 PROCEDURE — 1090F PRES/ABSN URINE INCON ASSESS: CPT | Performed by: INTERNAL MEDICINE

## 2022-10-24 PROCEDURE — 93971 EXTREMITY STUDY: CPT

## 2022-10-24 PROCEDURE — 3017F COLORECTAL CA SCREEN DOC REV: CPT | Performed by: INTERNAL MEDICINE

## 2022-10-24 PROCEDURE — G8427 DOCREV CUR MEDS BY ELIG CLIN: HCPCS | Performed by: INTERNAL MEDICINE

## 2022-10-24 PROCEDURE — 3074F SYST BP LT 130 MM HG: CPT | Performed by: INTERNAL MEDICINE

## 2022-10-24 PROCEDURE — 1036F TOBACCO NON-USER: CPT | Performed by: INTERNAL MEDICINE

## 2022-10-24 PROCEDURE — G8400 PT W/DXA NO RESULTS DOC: HCPCS | Performed by: INTERNAL MEDICINE

## 2022-10-24 PROCEDURE — G8417 CALC BMI ABV UP PARAM F/U: HCPCS | Performed by: INTERNAL MEDICINE

## 2022-10-24 PROCEDURE — 99214 OFFICE O/P EST MOD 30 MIN: CPT | Performed by: INTERNAL MEDICINE

## 2022-10-24 PROCEDURE — 3078F DIAST BP <80 MM HG: CPT | Performed by: INTERNAL MEDICINE

## 2022-10-24 PROCEDURE — 93000 ELECTROCARDIOGRAM COMPLETE: CPT | Performed by: INTERNAL MEDICINE

## 2022-10-24 RX ORDER — TRAMADOL HYDROCHLORIDE 50 MG/1
50 TABLET ORAL EVERY 6 HOURS PRN
Qty: 12 TABLET | Refills: 0 | Status: SHIPPED | OUTPATIENT
Start: 2022-10-24 | End: 2022-10-27

## 2022-10-24 RX ORDER — METOPROLOL SUCCINATE 50 MG/1
25 TABLET, EXTENDED RELEASE ORAL DAILY
Qty: 90 TABLET | Refills: 3 | Status: SHIPPED | OUTPATIENT
Start: 2022-10-24

## 2022-10-24 RX ORDER — SPIRONOLACTONE 25 MG/1
25 TABLET ORAL DAILY
Qty: 90 TABLET | Refills: 3 | Status: SHIPPED | OUTPATIENT
Start: 2022-10-24

## 2022-10-24 RX ORDER — FUROSEMIDE 20 MG/1
TABLET ORAL
Qty: 90 TABLET | Refills: 2 | Status: SHIPPED | OUTPATIENT
Start: 2022-10-24

## 2022-10-24 NOTE — PROGRESS NOTES
Cardiology Consultation     Ingrid Le  1950    PCP: Angie Ulrich MD  Reason for Referral: PAF, CHF   Chief Complaint:   Chief Complaint   Patient presents with    Follow-Up from Hospital     Right arm pain      Subjective:     History of Present Illness: The patient is 81 y.o. female with a past medical history significant for systolic heart failure, atrial fibrillation, hypertension, hyperlipidemia, and TIA who presents today for a follow up. She was admitted with dizziness and vertigo 2022. Head CT and MRI showed nothing acute but her echocardiogram showed a reduced EF at 35%. She did not routinely follow with a cardiologist and prior echo from 2017 showed a reduced EF. She underwent angiography that showed normal coronaries. Today, she states overall she is doing well but reports right arm pain since her procedure. She denies any new cardiac complaints. She denies any chest pains or worsening shortness of breath. She reports compliance with her medications and tolerating. She denies any abnormal bleeding or bruising. Patient denies exertional chest pain/pressure, dyspnea at rest, worsening WHITFIELD, PND, orthopnea, palpitations, lightheadedness, weight changes, changes in LE edema, and syncope.     Past Medical History:   Diagnosis Date    Arthritis     Atrial fibrillation (HCC)     CHF (congestive heart failure) ([address]     Depression     Hyperlipidemia     Hypertension      Past Surgical History:   Procedure Laterality Date    CARDIAC CATHETERIZATION       SECTION      HERNIA REPAIR       Family History   Problem Relation Age of Onset    Diabetes Mother     High Cholesterol Father     High Blood Pressure Father     Diabetes Maternal Uncle     Diabetes Maternal Grandmother      Social History     Tobacco Use    Smoking status: Never    Smokeless tobacco: Never   Substance Use Topics    Alcohol use: No    Drug use: No     Allergies   Allergen Reactions    Pcn [Penicillins]      Current Outpatient Medications   Medication Sig Dispense Refill    HYDROcodone-acetaminophen (NORCO) 7.5-325 MG per tablet TAKE 1 TABLET BY MOUTH EVERY 6 HOURS AS NEEDED      sacubitril-valsartan (ENTRESTO) 24-26 MG per tablet Take 1 tablet by mouth in the morning and 1 tablet before bedtime. 60 tablet 5    QUEtiapine (SEROQUEL) 25 MG tablet Take 1 tablet by mouth nightly 30 tablet 0    metoprolol succinate (TOPROL XL) 50 MG extended release tablet Take 0.5 tablets by mouth in the morning. 30 tablet 3    diclofenac sodium (VOLTAREN) 1 % GEL Apply topically 4 times daily as needed for Pain      ondansetron (ZOFRAN ODT) 4 MG disintegrating tablet Take 1 tablet by mouth every 8 hours as needed for Nausea 20 tablet 0    rivaroxaban (XARELTO) 20 MG TABS tablet Take 1 tablet by mouth daily 30 tablet 3    simvastatin (ZOCOR) 20 MG tablet ONE PILL AT BEDTIME      furosemide (LASIX) 40 MG tablet Take 0.5 tablets by mouth daily for 3 days (Patient not taking: Reported on 10/24/2022) 1.5 tablet 0     No current facility-administered medications for this visit. Review of Systems:  Constitutional: No unanticipated weight loss. There's been no change in energy level, sleep pattern, or activity level. No fevers, chills. Eyes: No visual changes or diplopia. No scleral icterus. ENT: No Headaches, hearing loss or vertigo. No mouth sores or sore throat. Cardiovascular: as reviewed in HPI  Respiratory: No cough or wheezing, no sputum production. No hemoptysis. Gastrointestinal: No abdominal pain, appetite loss, blood in stools. No change in bowel or bladder habits. Genitourinary: No dysuria, trouble voiding, or hematuria. Musculoskeletal:  No gait disturbance, no joint complaints. Integumentary: No rash or pruritis. Neurological: No headache, diplopia, change in muscle strength, numbness or tingling. Psychiatric: No anxiety or depression. Endocrine: No temperature intolerance.  No excessive thirst, fluid intake, or urination. No tremor. Hematologic/Lymphatic: No abnormal bruising or bleeding, blood clots or swollen lymph nodes. Allergic/Immunologic: No nasal congestion or hives. Physical Exam:   /82   Pulse 73   Ht 5' 7\" (1.702 m)   Wt 279 lb (126.6 kg)   SpO2 97%   BMI 43.70 kg/m²   Wt Readings from Last 3 Encounters:   10/24/22 279 lb (126.6 kg)   08/15/22 278 lb (126.1 kg)   07/29/22 277 lb 12.5 oz (126 kg)     Constitutional: She is oriented to person, place, and time. She appears well-developed and well-nourished. In no acute distress. Obese, rolling walker. Head: Normocephalic and atraumatic. Pupils equal and round. Neck: Neck supple. No JVP or carotid bruit appreciated. No mass and no thyromegaly present. No lymphadenopathy present. Cardiovascular: Normal rate. Normal heart sounds. Exam reveals no gallop and no friction rub. No murmur heard. Pulmonary/Chest: Effort normal and breath sounds normal. No respiratory distress. She has no wheezes, rhonchi or rales. Abdominal: Soft, non-tender. Bowel sounds are normal. She exhibits no organomegaly, mass or bruit. Extremities: 1-2+ BLE edema. No cyanosis or clubbing. Pulses are 2+ radial/carotid bilaterally. Neurological: No gross cranial nerve deficit. Coordination normal.   Skin: Skin is warm and dry. There is no rash or diaphoresis. Psychiatric: She has a normal mood and affect.  Her speech is normal and behavior is normal.     Lab Review:   FLP:    Lab Results   Component Value Date/Time    TRIG 96 07/23/2022 03:36 AM    HDL 55 07/23/2022 03:36 AM    HDL 57 01/18/2011 11:50 AM    LDLCALC 67 07/23/2022 03:36 AM    LABVLDL 19 07/23/2022 03:36 AM     BUN/Creatinine:    Lab Results   Component Value Date/Time    BUN 19 09/16/2022 10:09 AM    CREATININE 0.7 09/16/2022 10:09 AM     PT/INR, TNI, HGB A1C:   Lab Results   Component Value Date/Time    TROPONINI <0.01 07/22/2022 02:54 PM    LABA1C 5.7 07/23/2022 03:36 AM      No results found for: CBCAUTODIF    EKG Interpretation: 8/15/22 atrial fibrillation. Echo: 1/2017  Patient in atrial fibrillation. Poor image quality. Definity contrast used. Overall left ventricular systolic function appears severely reduced. Ejection fraction is difficult to estimate secondary to poor image quality  and tachycardia but seems < 30% with diffuse hypokinesis. Normal left  ventricular wall thickness and cavity size. the right ventricle is not well visualized but appears normal in size. The left atrium appears moderately dilated. Moderate mitral annular calcification. Trivial mitral and tricuspid regurgitation. Echo: 7/22/22   Very TDS with poor visualization of endocardium  Unable to make proper measurements due to poor acoustical window even with definity administered. Ejection fraction is visually estimated to be 35-40%. A bubble study was performed and fails to show evidence of shunting.     Cath: 9/7/22  Normal coronaries     All above diagnostic testing and laboratory data was independently visualized and reviewed by me (not simply review of report)       Assessment and Plan   1) Chronic systolic heart failure/NICM  -EF 35%, cath showed normal coronaries   -pt appears euvolemic today  -continue Toprol XL, Entresto 24-26 mg BID  -will start aldactone 25 mg daily and consider SGLT2  -repeat BMP in 1 week   -Lasix 20 mg M-W-F  -encouraged a low sodium diet  -continue to titrate medications as tolerated   -will repeat echocardiogram after maximal medical therapy    2) Persistent atrial fibrillation  -asymptomatic, rate controlled   -treatment options for atrial fibrillation discussed   -continue Xarelto 20 mg daily   -continue rate control strategy currently with beta-blocker    3) Essential hypertension  -controlled  -continue medical therapy    4) Hyperlipidemia  -continue statin with Zocor 20 mg daily     5) TIA   -continue statin     6) Morbid obesity/suspected sleep apnea   -BMI 43  -encouraged weight loss. -will referred to sleep medicine     7) Right arm pain   -reports pain s/p L/RHC   -discussed a venous doppler but reports compliance with Xarelto   -encouraged ice and Ultram 50 mg q6 hr   -will check venous doppler     8) Urinary frequency  -will arrange for a urology referral    Follow up in 3 months after echo     Thank you very much for allowing me to participate in the care of your patient. Please do not hesitate to contact me if you have any questions. Jhony Bautista MD [address] [facility], Interventional Cardiology, and Peripheral Vascular Disease   [facility]   Ph: [phone]  Fax: [phone]      This note was scribed in the presence of Dr Melquiades Redmond MD by Claude Early, ALYSSA. Physician Attestation:  The scribes documentation has been prepared under my direction and personally reviewed by me in its entirety. I confirm the note above accurately reflects all work, treatment, procedures, and medical decision making performed by me.     Electronically signed by Michelle Flannery MD on 11/13/2022 at 3:13 PM

## 2022-10-30 NOTE — OP NOTE
[address]   Procedure Note    CLINICAL HISTORY:       Cordell Galloway is a 81 y.o. female with a history of CHF, with work up of LV systolic heart failure     Patient Active Problem List   Diagnosis    Atrial fibrillation, rapid ([facility] [address]    Essential hypertension    Hyperlipidemia    Morbid [facility] with [facility] of 40.0-44.9, [facility]    Snorings    Cardiomyopathy ([facility]    TIA (transient ischemic attack)    Permanent atrial fibrillation (HCC)    Visual hallucinations       Current Outpatient Medications   Medication Sig Dispense Refill    metoprolol succinate (TOPROL XL) 50 MG extended release tablet Take 0.5 tablets by mouth daily 90 tablet 3    rivaroxaban (XARELTO) 20 MG TABS tablet Take 1 tablet by mouth daily 90 tablet 3    sacubitril-valsartan (ENTRESTO) 24-26 MG per tablet Take 1 tablet by mouth 2 times daily 180 tablet 3    spironolactone (ALDACTONE) 25 MG tablet Take 1 tablet by mouth daily 90 tablet 3    furosemide (LASIX) 20 MG tablet Take one tablet M-W-F 90 tablet 2    HYDROcodone-acetaminophen (NORCO) 7.5-325 MG per tablet TAKE 1 TABLET BY MOUTH EVERY 6 HOURS AS NEEDED      QUEtiapine (SEROQUEL) 25 MG tablet Take 1 tablet by mouth nightly 30 tablet 0    diclofenac sodium (VOLTAREN) 1 % GEL Apply topically 4 times daily as needed for Pain      ondansetron (ZOFRAN ODT) 4 MG disintegrating tablet Take 1 tablet by mouth every 8 hours as needed for Nausea 20 tablet 0    simvastatin (ZOCOR) 20 MG tablet ONE PILL AT BEDTIME       No current facility-administered medications for this encounter. The risks, benefits, and details of the procedure were explained to the patient. The patient verbalized understanding and wanted to proceed. Informed written consent was obtained. INDICATION:  LV systolic heart failure     PROCEDURES PERFORMED:   Coronary angiogram   [facility]     PROCEDURE TECHNIQUE:  The patient was approached from the right radial artery using a 5-6  French slender sheath.   Left coronary angiography was done using a Marcin L3.5 diagnostic catheter. Right coronary angiography was done using a Marcin R4 guide catheter. Right brachial vein using a 5-6 slender sheaht. CONTRAST:  Total of 30 cc. COMPLICATIONS:  None. At the end of the procedure a TR device was used for hemostasis. EBL: 5 cc        HEMODYNAMICS:      RV 35/13  PA 58/22/38  PCWP 29   PA % 63  AO % 100  CO/CI 5.8 L/min 2.5 L/min/m2  SVR 1102 dynes . Sec/cm-5   dynes .  Sec/cm-5    ANGIOGRAM/CORONARY ARTERIOGRAM:       The left main coronary artery is normal .    The left anterior descending artery is normal .   D1 is normal     The left circumflex artery is normal .   OM1 is normal     The right coronary artery is dominant vessel with normal .   RPDA is normal     INTERVENTION  None     SUMMARY:   Nonischemic CMP  Elevated left sided filling pressures       RECOMMENDATION:   - aggressive medical therapy for CHF   - increase home diuretic dosing     Lorraine Olguin MD [address] [facility], Interventional Cardiology, and Peripheral Vascular [address] [facility]   (C): [phone]  (O): [phone]

## 2022-10-30 NOTE — H&P
H&P Update -    Viraj Baig  1950     PCP: Zeinab Olivier MD  Reason for Referral: PAF, CHF   Chief Complaint:        Chief Complaint   Patient presents with    Follow-Up from [facility] f/u x TIA/ cardiomyopathy      Subjective:      History of Present Illness: The patient is 73 y.o. female with a past medical history significant for systolic heart failure, atrial fibrillation, hypertension, hyperlipidemia, and TIA who presents today for a follow up. She was admitted with dizziness and vertigo 2022. Head CT and MRI showed nothing acute but her echocardiogram showed a reduced EF at 35%. She did not routinely follow with a cardiologist and prior echo from 2017 showed a reduced EF. She is accompanied by her sister. She reports chronic WHITFIELD and typically will sleep with 3 pillows. She states she is active but states the dyspnea is limiting her activity. She utilizes a walker with ambulation and denies any recent falls. She is unaware of her Afib diagnosis and seemingly unaware of the arrhythmia. She reports compliance with her medications and tolerating. She denies any abnormal bleeding or bruising. Patient denies exertional chest pain/pressure, dyspnea at rest, PND, orthopnea, palpitations, lightheadedness, weight changes, changes in LE edema, and syncope.      Past Medical History        Past Medical History:   Diagnosis Date    Arthritis      Atrial fibrillation (HCC)      CHF (congestive heart failure) ([facility] [address]      Depression      Hyperlipidemia      Hypertension           Past Surgical History         Past Surgical History:   Procedure Laterality Date    CARDIAC CATHETERIZATION        SECTION        HERNIA REPAIR             Family History         Family History   Problem Relation Age of Onset    Diabetes Mother      High Cholesterol Father      High Blood Pressure Father      Diabetes Maternal Uncle      Diabetes Maternal Grandmother           Social History           Tobacco Use    Smoking status: Never    Smokeless tobacco: Never   Substance Use Topics    Alcohol use: No    Drug use: No              Allergies   Allergen Reactions    Pcn [Penicillins]        Current Facility-Administered Medications          Current Outpatient Medications   Medication Sig Dispense Refill    HYDROcodone-acetaminophen (NORCO) 7.5-325 MG per tablet TAKE 1 TABLET BY MOUTH EVERY 6 HOURS AS NEEDED        sacubitril-valsartan (ENTRESTO) 24-26 MG per tablet Take 1 tablet by mouth in the morning and 1 tablet before bedtime. 60 tablet 5    QUEtiapine (SEROQUEL) 25 MG tablet Take 1 tablet by mouth nightly 30 tablet 0    metoprolol succinate (TOPROL XL) 50 MG extended release tablet Take 0.5 tablets by mouth in the morning. 30 tablet 3    diclofenac sodium (VOLTAREN) 1 % GEL Apply topically 4 times daily as needed for Pain        ondansetron (ZOFRAN ODT) 4 MG disintegrating tablet Take 1 tablet by mouth every 8 hours as needed for Nausea 20 tablet 0    rivaroxaban (XARELTO) 20 MG TABS tablet Take 1 tablet by mouth daily 30 tablet 3    simvastatin (ZOCOR) 20 MG tablet ONE PILL AT BEDTIME          No current facility-administered medications for this visit. Review of Systems:  Constitutional: No unanticipated weight loss. There's been no change in energy level, sleep pattern, or activity level. No fevers, chills. Eyes: No visual changes or diplopia. No scleral icterus. ENT: No Headaches, hearing loss or vertigo. No mouth sores or sore throat. Cardiovascular: as reviewed in HPI  Respiratory: No cough or wheezing, no sputum production. No hemoptysis. Gastrointestinal: No abdominal pain, appetite loss, blood in stools. No change in bowel or bladder habits. Genitourinary: No dysuria, trouble voiding, or hematuria. Musculoskeletal:  No gait disturbance, no joint complaints. Integumentary: No rash or pruritis. Neurological: No headache, diplopia, change in muscle strength, numbness or tingling. Psychiatric: No anxiety or depression. Endocrine: No temperature intolerance. No excessive thirst, fluid intake, or urination. No tremor. Hematologic/Lymphatic: No abnormal bruising or bleeding, blood clots or swollen lymph nodes. Allergic/Immunologic: No nasal congestion or hives. Physical Exam:   Pulse 72   Ht 5' 7\" (1.702 m)   Wt 278 lb (126.1 kg)   SpO2 95%   BMI 43.54 kg/m²       Wt Readings from Last 3 Encounters:   08/15/22 278 lb (126.1 kg)   07/29/22 277 lb 12.5 oz (126 kg)   06/15/22 292 lb 15.9 oz (132.9 kg)      Constitutional: She is oriented to person, place, and time. She appears well-developed and well-nourished. In no acute distress. Obese, rolling walker. Head: Normocephalic and atraumatic. Pupils equal and round. Neck: Neck supple. No JVP or carotid bruit appreciated. No mass and no thyromegaly present. No lymphadenopathy present. Cardiovascular: Normal rate. Normal heart sounds. Exam reveals no gallop and no friction rub. No murmur heard. Pulmonary/Chest: Effort normal and breath sounds normal. No respiratory distress. She has no wheezes, rhonchi or rales. Abdominal: Soft, non-tender. Bowel sounds are normal. She exhibits no organomegaly, mass or bruit. Extremities: 2+ BLE edema. No cyanosis or clubbing. Pulses are 2+ radial/carotid bilaterally. Neurological: No gross cranial nerve deficit. Coordination normal.   Skin: Skin is warm and dry. There is no rash or diaphoresis. Psychiatric: She has a normal mood and affect.  Her speech is normal and behavior is normal.      Lab Review:   FLP:          Lab Results   Component Value Date/Time     TRIG 96 07/23/2022 03:36 AM     HDL 55 07/23/2022 03:36 AM     HDL 57 01/18/2011 11:50 AM     LDLCALC 67 07/23/2022 03:36 AM     LABVLDL 19 07/23/2022 03:36 AM      BUN/Creatinine:          Lab Results   Component Value Date/Time     BUN 21 07/28/2022 01:32 PM     CREATININE 0.8 07/28/2022 01:32 PM      PT/INR, TNI, HGB A1C:         Lab Results   Component Value Date/Time     TROPONINI <0.01 07/22/2022 02:54 PM     LABA1C 5.7 07/23/2022 03:36 AM      No results found for: CBCAUTODIF     EKG Interpretation: 8/15/22 atrial fibrillation. Echo: 1/2017  Patient in atrial fibrillation. Poor image quality. Definity contrast used. Overall left ventricular systolic function appears severely reduced. Ejection fraction is difficult to estimate secondary to poor image quality  and tachycardia but seems < 30% with diffuse hypokinesis. Normal left  ventricular wall thickness and cavity size. the right ventricle is not well visualized but appears normal in size. The left atrium appears moderately dilated. Moderate mitral annular calcification. Trivial mitral and tricuspid regurgitation. Echo: 7/22/22   Very TDS with poor visualization of endocardium  Unable to make proper measurements due to poor acoustical window even with definity administered. Ejection fraction is visually estimated to be 35-40%. A bubble study was performed and fails to show evidence of shunting.      All above diagnostic testing and laboratory data was independently visualized and reviewed by me (not simply review of report)         Assessment and Plan   1) Chronic systolic heart failure/NICM  -EF 35%  -pt appears euvolemic today  -continue Toprol XL   -discontinue valsartan and will initiate Entresto 24-26 mg BID   -will start aldactone at follow up and consider SGLT2  -encouraged a low sodium diet  -chronic LE edema  -continue to titrate medications as tolerated   -will repeat echocardiogram after maximal medical therapy  -will arrange for a left and right heart cath   -consider diuretic after RHC      2) Persistent atrial fibrillation  -asymptomatic  -treatment options for atrial fibrillation discussed   -continue Xarelto 20 mg daily   -continue rate control strategy currently with beta-blocker     3) Essential hypertension  -controlled  -continue medical therapy     4)

## 2022-11-07 ENCOUNTER — TELEPHONE (OUTPATIENT)
Dept: CARDIOLOGY CLINIC | Age: 72
End: 2022-11-07

## 2022-11-07 NOTE — TELEPHONE ENCOUNTER
Emanuel Rivas called in this morning, she would like to know the results of her venous right doppler on 10/24. She can be reached at [phone].

## 2023-01-24 ENCOUNTER — HOSPITAL ENCOUNTER (OUTPATIENT)
Dept: NON INVASIVE DIAGNOSTICS | Age: 73
Discharge: HOME | End: 2023-01-24
Payer: MEDICARE

## 2023-01-24 DIAGNOSIS — I50.22 CHRONIC SYSTOLIC HEART FAILURE (HCC): ICD-10-CM

## 2023-01-24 LAB
LV EF: 28 %
LVEF MODALITY: NORMAL

## 2023-01-24 PROCEDURE — C8929 TTE W OR WO FOL WCON,DOPPLER: HCPCS

## 2023-01-24 PROCEDURE — 6360000004 HC RX CONTRAST MEDICATION: Performed by: INTERNAL MEDICINE

## 2023-01-24 RX ADMIN — PERFLUTREN 1.5 ML: 6.52 INJECTION, SUSPENSION INTRAVENOUS at 15:44

## 2023-01-25 NOTE — RESULT ENCOUNTER NOTE
Please notify patient that their heart ultrasound shows the heart remains very weak despite medical therapy. Please refer to dr. Nakia Garcia to discuss, BiV ICD and possible need for afib intervention as well.

## 2023-01-30 ENCOUNTER — OFFICE VISIT (OUTPATIENT)
Dept: CARDIOLOGY CLINIC | Age: 73
End: 2023-01-30
Payer: MEDICARE

## 2023-01-30 VITALS
WEIGHT: 278 LBS | DIASTOLIC BLOOD PRESSURE: 72 MMHG | BODY MASS INDEX: 43.63 KG/M2 | OXYGEN SATURATION: 97 % | HEART RATE: 63 BPM | HEIGHT: 67 IN | SYSTOLIC BLOOD PRESSURE: 114 MMHG

## 2023-01-30 DIAGNOSIS — E66.01 MORBID OBESITY (HCC): ICD-10-CM

## 2023-01-30 DIAGNOSIS — I48.19 PERSISTENT ATRIAL FIBRILLATION (HCC): ICD-10-CM

## 2023-01-30 DIAGNOSIS — G45.9 TIA (TRANSIENT ISCHEMIC ATTACK): ICD-10-CM

## 2023-01-30 DIAGNOSIS — I10 ESSENTIAL HYPERTENSION: ICD-10-CM

## 2023-01-30 DIAGNOSIS — M79.601 RIGHT ARM PAIN: ICD-10-CM

## 2023-01-30 DIAGNOSIS — E78.5 HYPERLIPIDEMIA LDL GOAL <100: ICD-10-CM

## 2023-01-30 DIAGNOSIS — I50.22 CHRONIC SYSTOLIC HEART FAILURE (HCC): Primary | ICD-10-CM

## 2023-01-30 PROCEDURE — 1123F ACP DISCUSS/DSCN MKR DOCD: CPT | Performed by: INTERNAL MEDICINE

## 2023-01-30 PROCEDURE — G8484 FLU IMMUNIZE NO ADMIN: HCPCS | Performed by: INTERNAL MEDICINE

## 2023-01-30 PROCEDURE — 1090F PRES/ABSN URINE INCON ASSESS: CPT | Performed by: INTERNAL MEDICINE

## 2023-01-30 PROCEDURE — G8400 PT W/DXA NO RESULTS DOC: HCPCS | Performed by: INTERNAL MEDICINE

## 2023-01-30 PROCEDURE — 3017F COLORECTAL CA SCREEN DOC REV: CPT | Performed by: INTERNAL MEDICINE

## 2023-01-30 PROCEDURE — 3078F DIAST BP <80 MM HG: CPT | Performed by: INTERNAL MEDICINE

## 2023-01-30 PROCEDURE — 3074F SYST BP LT 130 MM HG: CPT | Performed by: INTERNAL MEDICINE

## 2023-01-30 PROCEDURE — G8427 DOCREV CUR MEDS BY ELIG CLIN: HCPCS | Performed by: INTERNAL MEDICINE

## 2023-01-30 PROCEDURE — G8417 CALC BMI ABV UP PARAM F/U: HCPCS | Performed by: INTERNAL MEDICINE

## 2023-01-30 PROCEDURE — 1036F TOBACCO NON-USER: CPT | Performed by: INTERNAL MEDICINE

## 2023-01-30 PROCEDURE — 99214 OFFICE O/P EST MOD 30 MIN: CPT | Performed by: INTERNAL MEDICINE

## 2023-01-30 RX ORDER — METOPROLOL SUCCINATE 50 MG/1
50 TABLET, EXTENDED RELEASE ORAL DAILY
Qty: 90 TABLET | Refills: 3 | Status: SHIPPED
Start: 2023-01-30

## 2023-01-30 NOTE — PROGRESS NOTES
Cardiology Note    Ankita Yañez  1950    PCP: Carina Biggs MD  Reason for Referral: PAF, CHF   Chief Complaint:   Chief Complaint   Patient presents with    Follow-up     Sob on exertion        Subjective:     History of Present Illness: The patient is 81 y.o. female with a past medical history significant for systolic heart failure, atrial fibrillation, hypertension, hyperlipidemia, and TIA who presents today for a follow up. She was admitted with dizziness and vertigo 2022. Head CT and MRI showed nothing acute but her echocardiogram showed a reduced EF at 35%. She did not routinely follow with a cardiologist and prior echo from  showed a reduced EF. She underwent angiography that showed normal coronaries. Patient presents for follow up. She is accompanied by family. She breathes hard at night which prevents her from sleeping at night. She sleeps on 3 pillows at night. Has difficulty breathing with less than 3 pillows. She uses a walker for ambulation to prevent falls as her knees \"give out at times. \" She has only been taking         Past Medical History:   Diagnosis Date    Arthritis     Atrial fibrillation (HCC)     CHF (congestive heart failure) (HCC)     Depression     Hyperlipidemia     Hypertension      Past Surgical History:   Procedure Laterality Date    CARDIAC CATHETERIZATION       SECTION      HERNIA REPAIR       Family History   Problem Relation Age of Onset    Diabetes Mother     High Cholesterol Father     High Blood Pressure Father     Diabetes Maternal Uncle     Diabetes Maternal Grandmother      Social History     Tobacco Use    Smoking status: Never    Smokeless tobacco: Never   Substance Use Topics    Alcohol use: No    Drug use: No     Allergies   Allergen Reactions    Pcn [Penicillins]      Current Outpatient Medications   Medication Sig Dispense Refill    metoprolol succinate (TOPROL XL) 50 MG extended release tablet Take 0.5 tablets by mouth daily 90 tablet 3    rivaroxaban (XARELTO) 20 MG TABS tablet Take 1 tablet by mouth daily 90 tablet 3    sacubitril-valsartan (ENTRESTO) 24-26 MG per tablet Take 1 tablet by mouth 2 times daily 180 tablet 3    HYDROcodone-acetaminophen (NORCO) 7.5-325 MG per tablet TAKE 1 TABLET BY MOUTH EVERY 6 HOURS AS NEEDED      QUEtiapine (SEROQUEL) 25 MG tablet Take 1 tablet by mouth nightly 30 tablet 0    diclofenac sodium (VOLTAREN) 1 % GEL Apply topically 4 times daily as needed for Pain      simvastatin (ZOCOR) 20 MG tablet ONE PILL AT BEDTIME      spironolactone (ALDACTONE) 25 MG tablet Take 1 tablet by mouth daily (Patient not taking: Reported on 1/30/2023) 90 tablet 3    furosemide (LASIX) 20 MG tablet Take one tablet M-W-F (Patient not taking: Reported on 1/30/2023) 90 tablet 2     No current facility-administered medications for this visit. Review of Systems:  Constitutional: No unanticipated weight loss. There's been no change in energy level, sleep pattern, or activity level. No fevers, chills. Eyes: No visual changes or diplopia. No scleral icterus. ENT: No Headaches, hearing loss or vertigo. No mouth sores or sore throat. Cardiovascular: as reviewed in HPI  Respiratory: No cough or wheezing, no sputum production. No hemoptysis. Gastrointestinal: No abdominal pain, appetite loss, blood in stools. No change in bowel or bladder habits. Genitourinary: No dysuria, trouble voiding, or hematuria. Musculoskeletal:  No gait disturbance, no joint complaints. Integumentary: No rash or pruritis. Neurological: No headache, diplopia, change in muscle strength, numbness or tingling. Psychiatric: No anxiety or depression. Endocrine: No temperature intolerance. No excessive thirst, fluid intake, or urination. No tremor. Hematologic/Lymphatic: No abnormal bruising or bleeding, blood clots or swollen lymph nodes. Allergic/Immunologic: No nasal congestion or hives.     Physical Exam:   /72 Pulse 63   Ht 5' 7\" (1.702 m)   Wt 278 lb (126.1 kg)   SpO2 97%   BMI 43.54 kg/m²   Wt Readings from Last 3 Encounters:   01/30/23 278 lb (126.1 kg)   10/24/22 279 lb (126.6 kg)   08/15/22 278 lb (126.1 kg)     Constitutional: She is oriented to person, place, and time. She appears well-developed and well-nourished. In no acute distress. Obese, rolling walker. Head: Normocephalic and atraumatic. Pupils equal and round. Neck: Neck supple. No JVP or carotid bruit appreciated. No mass and no thyromegaly present. No lymphadenopathy present. Cardiovascular: Normal rate. Normal heart sounds. Exam reveals no gallop and no friction rub. No murmur heard. Pulmonary/Chest: Effort normal and breath sounds normal. No respiratory distress. She has no wheezes, rhonchi or rales. Abdominal: Soft, non-tender. Bowel sounds are normal. She exhibits no organomegaly, mass or bruit. Extremities: 1-2+ BLE edema. No cyanosis or clubbing. Pulses are 2+ radial/carotid bilaterally. Neurological: No gross cranial nerve deficit. Coordination normal.   Skin: Skin is warm and dry. There is no rash or diaphoresis. Psychiatric: She has a normal mood and affect. Her speech is normal and behavior is normal.     Lab Review:   FLP:    Lab Results   Component Value Date/Time    TRIG 96 07/23/2022 03:36 AM    HDL 55 07/23/2022 03:36 AM    HDL 57 01/18/2011 11:50 AM    LDLCALC 67 07/23/2022 03:36 AM    LABVLDL 19 07/23/2022 03:36 AM     BUN/Creatinine:    Lab Results   Component Value Date/Time    BUN 19 09/16/2022 10:09 AM    CREATININE 0.7 09/16/2022 10:09 AM     PT/INR, TNI, HGB A1C:   Lab Results   Component Value Date/Time    TROPONINI <0.01 07/22/2022 02:54 PM    LABA1C 5.7 07/23/2022 03:36 AM      No results found for: CBCAUTODIF    EKG Interpretation: 8/15/22 atrial fibrillation. Echo: 1/2017  Patient in atrial fibrillation. Poor image quality. Definity contrast used.   Overall left ventricular systolic function appears severely reduced.  Ejection fraction is difficult to estimate secondary to poor image quality  and tachycardia but seems < 30% with diffuse hypokinesis. Normal left  ventricular wall thickness and cavity size.  the right ventricle is not well visualized but appears normal in size.  The left atrium appears moderately dilated.  Moderate mitral annular calcification.  Trivial mitral and tricuspid regurgitation.    Echo: 7/22/22   Very TDS with poor visualization of endocardium  Unable to make proper measurements due to poor acoustical window even with definity administered. Ejection fraction is visually estimated to be 35-40%. A bubble study was performed and fails to show evidence of shunting.    Cath: 9/7/22  Normal coronaries     Venous doppler 10/24/22     Impressions   Right Impression   Acute, isolated totally occluded superficial venous thrombosis involving the   right median cubital vein.   No other evidence of deep vein thrombosis or superficial vein thrombosis of   the right upper extremity or left internal jugular vein and subclavian vein.       Conclusions        Summary        Acute, isolated totally occluded superficial venous thrombosis involving the    right median cubital vein.    No other evidence of deep vein thrombosis or superficial vein thrombosis of    the right upper extremity or left internal jugular vein and subclavian vein.         ECHO 1/24/23   Suboptimal image quality. Definity contrast administered.   Patient appears to be in atrial fibrillation.   Overall left ventricular systolic function appears severely reduced.   Ejection fraction is visually estimated to be 25-30% with diffuse   hypokinesis and paradoxical septal motion due to left bundle branch block.   Normal left ventricular wall thickness. Left ventricular cavity size is   mildly dilated.   The right ventricle is not well visualized but appears grossly normal in   size and function.   The left atrium is severely dilated.    Trivial aortic and tricuspid regurgitation. All above diagnostic testing and laboratory data was independently visualized and reviewed by me (not simply review of report)       Assessment and Plan   1) Chronic systolic heart failure/NICM  -EF 35%, cath showed normal coronaries   -pt appears euvolemic today  -repeat Echo 1/24/23 resulting in reduced LVEF of 25-30%. Discussed the need for BiV ICD and possible need for afib intervention. Will arrange for follow up with EP.   -encouraged low sodium diet  -medications adjusted by another physician due to hypotension. -new medication changes; Toprol XL 50 mg daily, Entresto 24-26 mg BID, Jardiance 10 mg daily (if Jardiance not covered, may try Farxiga 10 mg daily). -will continue to titrate medications as tolerated. 2) Persistent atrial fibrillation  -asymptomatic, rate controlled   -treatment options for atrial fibrillation discussed   -continue Xarelto 20 mg daily   -continue rate control strategy currently with beta-blocker  -follow up with EP for afib intervention and BiV ICD for reduced EF of 25-30%    3) Essential hypertension  -controlled  -continue medical therapy    4) Hyperlipidemia  -continue statin with Zocor 20 mg daily     5) TIA   -continue statin     6) Morbid obesity/suspected sleep apnea   -BMI 43  -encouraged weight loss. -will referred to sleep medicine     7) Right arm pain   -reports pain s/p L/RHC   -discussed a venous doppler but reports compliance with Xarelto   -encouraged ice and Ultram 50 mg q6 hr   -venous doppler resulted in acute, isolated totally occluded superficial thrombus  -currently on Xarelto     Follow up in 3 months     Thank you very much for allowing me to participate in the care of your patient. Please do not hesitate to contact me if you have any questions.     Lachelle Caceres MD [id number] [facility], Interventional Cardiology, and Peripheral Vascular Disease   [address]   Ph: [phone]  Fax: [phone]      This note was scribed in the presence of Dr. Ruthie Thayer MD by Beth Bland RN    Physician Attestation:  The scribes documentation has been prepared under my direction and personally reviewed by me in its entirety. I confirm the note above accurately reflects all work, treatment, procedures, and medical decision making performed by me.     Electronically signed by Master James MD on 2/1/2023 at 7:05 PM

## 2023-02-02 ENCOUNTER — TELEPHONE (OUTPATIENT)
Dept: CARDIOLOGY CLINIC | Age: 73
End: 2023-02-02

## 2023-02-02 NOTE — TELEPHONE ENCOUNTER
Attempted to call pt in regards to rescheduling appointment from today with Dr. Carlos Cuevas but no answer. Will try again later.

## 2023-02-27 ENCOUNTER — OFFICE VISIT (OUTPATIENT)
Dept: CARDIOLOGY CLINIC | Age: 73
End: 2023-02-27
Payer: MEDICARE

## 2023-02-27 VITALS
BODY MASS INDEX: 43.16 KG/M2 | OXYGEN SATURATION: 96 % | SYSTOLIC BLOOD PRESSURE: 130 MMHG | WEIGHT: 275 LBS | HEART RATE: 86 BPM | HEIGHT: 67 IN | DIASTOLIC BLOOD PRESSURE: 74 MMHG

## 2023-02-27 DIAGNOSIS — I50.22 CHRONIC SYSTOLIC HEART FAILURE (HCC): Primary | ICD-10-CM

## 2023-02-27 PROCEDURE — G8417 CALC BMI ABV UP PARAM F/U: HCPCS | Performed by: INTERNAL MEDICINE

## 2023-02-27 PROCEDURE — 1123F ACP DISCUSS/DSCN MKR DOCD: CPT | Performed by: INTERNAL MEDICINE

## 2023-02-27 PROCEDURE — 1090F PRES/ABSN URINE INCON ASSESS: CPT | Performed by: INTERNAL MEDICINE

## 2023-02-27 PROCEDURE — 3017F COLORECTAL CA SCREEN DOC REV: CPT | Performed by: INTERNAL MEDICINE

## 2023-02-27 PROCEDURE — G8427 DOCREV CUR MEDS BY ELIG CLIN: HCPCS | Performed by: INTERNAL MEDICINE

## 2023-02-27 PROCEDURE — G8400 PT W/DXA NO RESULTS DOC: HCPCS | Performed by: INTERNAL MEDICINE

## 2023-02-27 PROCEDURE — 99205 OFFICE O/P NEW HI 60 MIN: CPT | Performed by: INTERNAL MEDICINE

## 2023-02-27 PROCEDURE — 3075F SYST BP GE 130 - 139MM HG: CPT | Performed by: INTERNAL MEDICINE

## 2023-02-27 PROCEDURE — 1036F TOBACCO NON-USER: CPT | Performed by: INTERNAL MEDICINE

## 2023-02-27 PROCEDURE — 93000 ELECTROCARDIOGRAM COMPLETE: CPT | Performed by: INTERNAL MEDICINE

## 2023-02-27 PROCEDURE — 3078F DIAST BP <80 MM HG: CPT | Performed by: INTERNAL MEDICINE

## 2023-02-27 PROCEDURE — G8484 FLU IMMUNIZE NO ADMIN: HCPCS | Performed by: INTERNAL MEDICINE

## 2023-02-27 NOTE — PROGRESS NOTES
Cardiac Electrophysiology Consultation   Date: 2023  Reason for Consultation: atrial fibrillation, consideration of BIV ICD. Consult Requesting Physician:  Ronaldo Cuevas MD  Primary Care Physician: Tom Madrigal MD    Chief Complaint:   Chief Complaint   Patient presents with    New Patient    Chest Pain        HPI: Maura Frank is a 81 y.o. patient with a history of systolic heart failure, atrial fibrillation, hypertension, hyperlipidemia, and TIA. She was hospitalized at [facility] 2022 after presenting with dizziness and vertigo. CT of head and MRI showed nothing acute. Echo showed LVEF 35 %. Of note she had Echo I  which also showed decreased LVED but she did not follow up with cardiologist. She underwent Left an right heart cath on 2022 which showed normal coronaries. Repeat Echo on 2023 showed LVEF remained depressed at 25-30 % despite optimized guideline directed medical therapy. Today, she presents to office for consideration of BIV ICD. EKG today shows atrial fibrillation, LBBB, v-rate 104 bpm. Symptomatic with fatigue and WHITFIELD. Patient does not endorse any identifiable exacerbating or alleviating factors for the atrial fibrillation. Reports compliance with medications and tolerating them well. Denies chest pain/pressure, tightness, edema, heart racing, palpitations, lightheadedness, dizziness, syncope, presyncope,  PND or orthopnea. Past Medical History:   Diagnosis Date    Arthritis     Atrial fibrillation ([address]     CHF (congestive heart failure) ([address]     Depression     Hyperlipidemia     Hypertension         Past Surgical History:   Procedure Laterality Date    CARDIAC CATHETERIZATION       SECTION      HERNIA REPAIR         Allergies: Allergies   Allergen Reactions    Pcn [Penicillins]        Medication:   Prior to Admission medications    Medication Sig Start Date End Date Taking?  Authorizing Provider   metoprolol succinate (TOPROL XL) 50 MG extended release tablet Take 1 tablet by mouth daily 1/30/23  Yes Ginny Franklin MD   empagliflozin (JARDIANCE) 10 MG tablet Take 1 tablet by mouth daily 1/30/23  Yes Ginny Franklin MD   rivaroxaban (XARELTO) 20 MG TABS tablet Take 1 tablet by mouth daily 10/24/22  Yes Ginny Franklin MD   sacubitril-valsartan (ENTRESTO) 24-26 MG per tablet Take 1 tablet by mouth 2 times daily 10/24/22  Yes Ginny Franklin MD   HYDROcodone-acetaminophen ([address]) 7.5-325 MG per tablet TAKE 1 TABLET BY MOUTH EVERY 6 HOURS AS NEEDED 7/27/22  Yes Historical Provider, MD   QUEtiapine (SEROQUEL) 25 MG tablet Take 1 tablet by mouth nightly 7/29/22  Yes Annalee Knight MD   diclofenac sodium (VOLTAREN) 1 % GEL Apply topically 4 times daily as needed for Pain   Yes Historical Provider, MD   simvastatin (ZOCOR) 20 MG tablet ONE PILL AT BEDTIME 3/14/16  Yes Historical Provider, MD       Social History:   reports that she has never smoked. She has never used smokeless tobacco. She reports that she does not drink alcohol and does not use drugs. Family History:  family history includes Diabetes in her maternal grandmother, maternal uncle, and mother; High Blood Pressure in her father; High Cholesterol in her father. Reviewed.  Denies family history of sudden cardiac death, arrhythmia, premature CAD    Review of System:    General ROS: negative for - chills, fever   Psychological ROS: negative for - anxiety or depression  Ophthalmic ROS: negative for - eye pain or loss of vision  ENT ROS: negative for - epistaxis, headaches, nasal discharge, sore throat   Allergy and Immunology ROS: negative for - hives, nasal congestion   Hematological and Lymphatic ROS: negative for - bleeding problems, blood clots, bruising or jaundice  Endocrine ROS: negative for - skin changes, temperature intolerance or unexpected weight changes  Respiratory ROS: negative for - cough, hemoptysis, pleuritic pain, SOB, sputum changes or wheezing  Cardiovascular ROS: Per HPI. Gastrointestinal ROS: negative for - abdominal pain, blood in stools, diarrhea, hematemesis, melena, nausea/vomiting or swallowing difficulty/pain  Genito-Urinary ROS: negative for - dysuria or incontinence  Musculoskeletal ROS: negative for - joint swelling or muscle pain  Neurological ROS: negative for - confusion, dizziness, gait disturbance, headaches, numbness/tingling, seizures, speech problems, tremors, visual changes or weakness  Dermatological ROS: negative for - rash    Physical Examination:  Vitals:    02/27/23 0929   BP: 130/74   Pulse: 86   SpO2: 96%       Constitutional: Oriented. No distress. Head: Normocephalic and atraumatic. Mouth/Throat: Oropharynx is clear and moist.   Eyes: Conjunctivae normal. EOM are normal.   Neck: Normal range of motion. Neck supple. No rigidity. No JVD present. Cardiovascular: Normal rate, irregular rhythm, S1&S2 and intact distal pulses. Pulmonary/Chest: Bilateral respiratory sounds. No wheezes. No rhonchi. Abdominal: Soft. Bowel sounds present. No distension, No tenderness. Musculoskeletal: No tenderness. No edema    Lymphadenopathy: Has no cervical adenopathy. Neurological: Alert and oriented. Cranial nerve appears intact, No Gross deficit   Skin: Skin is warm and dry. No rash noted. Psychiatric: Has a normal mood, affect and behavior     Labs:  Reviewed. ECG: Atrial fibrillation, LBBB with v-rate of 104 bpm with QRS duration 148 ms. No pathologic Q waves, ventricular pre-excitation, or QT prolongation. Studies:   1. Event monitor:       2. Echo: 01/24/2023  Suboptimal image quality. Definity contrast administered. Patient appears to be in atrial fibrillation. Overall left ventricular systolic function appears severely reduced. Ejection fraction is visually estimated to be 25-30% with diffuse  hypokinesis and paradoxical septal motion due to left bundle branch block. Normal left ventricular wall thickness.  Left ventricular cavity size is mildly dilated. The right ventricle is not well visualized but appears grossly normal in size and function. The left atrium is severely dilated. Trivial aortic and tricuspid regurgitation  LA volume/Index: 107 ml /46 ml/m2    3. Stress Test:        4. Cath:     I independently reviewed and interpreted the ECG, MCOT, echocardiogram, stress test, and coronary angiography/PCI results and used them for my plan of care. Procedures:  1. Assessment/Plan:     Chronic systolic heart failure  Non ischemic cardiomyopathy  - EF 35%, cath showed normal coronaries   - Repeat Echo 01/24/2023 resulting in reduced LVEF of 25-30%. - Continue optimized guideline directed medical therapy, which patient has been on for greater than three months. Beta Blocker: Toprol XL 50 mg daily. ACE/ ARNI/ARB: Entresto 24-26 mg BID   SGLT2 Inhibitor: Jardiance 10 mg daily              Aldosterone Antagonist: Not on due to hypotension.    - Euvolemic on today's exam.  - LBBB,  msec. Much time was spent with the patient discussing the pathophysiology of the patient's cardiomyopathy, the high risk of sudden cardiac death due to the cardiomyopathy, the utility of an implantable cardioverter defibrillator, the benefit and risks of the implantation of a BiV ICD, the benefits and risks of living with a BiV ICD, the lifestyle changes that come along with having a BiV ICD, and the logistics of BiV ICD care and generator changes that go along with having a BiV ICD. The risks including, but not limited to, vascular injury, bleeding, infection, device malfunction, lead dislodgement, radiation exposure, injury to cardiac and surrounding structures (including pneumothorax), stroke, myocardial infarction and death were discussed in detail.  The patient was also counseled at length about the risks of nely Covid-19 in the segun-operative and post-operative states including the recovery window of their procedure. The patient was made aware that nely Covid-19 after a surgical procedure may worsen their prognosis for recovering from the virus and lend to a higher morbidity and or mortality risk. The patient was given the option of postponing their procedure. The patient was also presented reasonable alternatives to the proposed care, treatment, and services. The discussion I have had with the patient encompassed risks, benefits, and side effects related to the alternatives and the risks related to not receiving the proposed care, treatment and services. All of the topics above were covered with the patient. The patient would like some time to deliberate further regarding procedure. If she wishes to proceed, he will contact our nurse, Ronny Eddy at which time we will a [employer] BiV ICD implantation for primary prevention of sudden cardiac arrest and for heart failure management. Long standing persistent atrial fibrillation  - Symptomatic with fatigue and WHITFIELD.  - Patient has a [id number] Score 5 (HTN, CHF, TIA, AGE, GENDER)  - Continue Xarelto 20 daily for thromboembolic risk reduction.  - Patient has not been taking her Xarelto with food. Instructed to take with largest meal of the day. - Tolerating well no signs or symptoms of abnormal bruising or bleeding.  - Continue rate control strategy currently with beta-blocker. - Treatment options for atrial fibrillation including cardioversion, anti-arrhythmic medication therapy, rate control strategy, oral anticoagulation, and atrial fibrillation ablation were discussed with patient. Risks, benefits and alternative of each treatment options (care, treatment, and services) were explained. The patient was also presented reasonable alternatives to the proposed care, treatment, and services.  The discussion I have had with the patient encompassed risks, benefits, and side effects related to the alternatives and the risks related to not receiving the proposed care, treatment and services. All questions were answered. - I recommend that she undergo CV to see if SR can be successfully restored and then start on amiodarone. We educated the patient that atrial fibrillation is a worsening and progressive disease, with more frequent episodes that will ensue. Subsequent episodes usually become more sustained to the extent that many individuals would then develop persistent atrial fibrillation. Once persistence is reached, permanent atrial fibrillation is inevitable. We also discussed the fact that atrial fibrillation is associated with stroke, including life-threatening stroke, and therefore oral anticoagulation is warranted depending on the patient's [id number] score. We discussed different management options for atrial fibrillation including their risks and benefits. These options include use of cardioversion (mainly for persisting atrial fibrillation or atrial flutter) which provides an effective immediate therapy with success rates of 75% or higher, but it provides no short nor long term efficacy. Anti-arrhythmic medications provide a very effective short term therapy, but even with our most potent anti-arrhythmic medication there is limited long term efficacy (clinical studies have shown that 40% of patients remain atrial fibrillation-free after 4 years of follow-up after starting one of the more powerful anti-arrhythmic medication (amiodarone), and, if extrapolated, may have further diminishing success as time goes on). Atrial fibrillation ablation is a potentially curative therapy with very reasonable success rate after a first time procedure and with improving success rates with subsequent procedures. The risks, benefits and alternatives of the atrial fibrillation ablation procedure were discussed with the patient.  The risks including, but not limited to, bleeding, infection, radiation exposure, injury to vascular, cardiac and surrounding structures (including pneumothorax), stroke, cardiac perforation, tamponade, need for emergent open heart surgery, need for pacemaker implantation, injury to the phrenic nerve, injury to the esophagus, myocardial infarction and death were discussed in detail. The patient was also counseled at length about the risks of nely Covid-19 in the segun-operative and post-operative states including the recovery window of their procedure. The patient was made aware that nely Covid-19 after a surgical procedure may worsen their prognosis for recovering from the virus and lend to a higher morbidity and or mortality risk. The patient was given the option of postponing their procedure. The patient was also presented reasonable alternatives to the proposed care, treatment, and services. The discussion I have had with the patient encompassed risks, benefits, and side effects related to the alternatives and the risks related to not receiving the proposed care, treatment and services. I do not recommend ablation at his time but would first like to see if we are to restore SR with cardioversion and if successful then proceed with ablation if patient chooses to do so. - Much time was spent counseling the patient on healthier lifestyle, following a low sodium diet <2,000 mg (HF) of sodium a day and dramatically decreasing the intake of processed sugar < 24 grams for female and 36 grams male. - Patient educated to eat a diet which includes organic fruits, vegetables and meats.    - Encouraged to watch \"That Sugar Film\" which can be found on [employer] and other [employer] services, which discusses the negative impact of processed sugar has on the body. Essential hypertension  -controlled  -continue medical therapy     Hyperlipidemia  -Continue statin therapy, Zocor 20 mg daily. Tolerating well with no reported myalgias.   - LDL 67 (07/23/2022)    TIA   -continue statin therapy.     Follow ups:  - Follow up one to two weeks after cardioversion if she decides to proceed. - Follow up three months after ablation procedure with myself if decides to proceed otherwise follow up PRN. -Follow up 1 week post BIV ICD  implantation in the device clinic for site check and device interrogation and 3 months after BIV ICD implantation with KEELEY Le - CNP if he decides to proceed. If she does not proceed with ICD implantation, he will follow-up PRN. -Continue routine follow up with Mimi Allen MD.    Thank you for allowing me to participate in the care of Terry Patriciameggan. All questions and concerns were addressed to the patient/family. Alternatives to my treatment were discussed. This note was scribed in the presence of Dr. Joel Cardona MD by Dafne Irwin RN. The scribe's documentation has been prepared under my direction and personally reviewed by me in its entirety. I confirm that the note above accurately reflects all work, physical examination, the discussion of treatments and procedures, and medical decision making performed by me.     Joel Cardona MD, MS, [facility] [place], [facility]  Cardiac Electrophysiology  [address]  [address] [facility] [place], [address] [place] [address]  Carlos Eduardo Iniguez [facility] [address]  [phone]

## 2023-02-27 NOTE — PATIENT INSTRUCTIONS
Dr. Aileen Lambert recommends you undergo cardioversion and if successful proceed with ablation. If you have any question regarding your cardioversion or would like to proceed with scheduling please contact Dr. Leonardo Arriaga at [phone]. DAVID / Cardioversion Pre Procedure Instructions     Date:____________________________    Arrive at:_________________________    Procedure time:_____________________      The morning of your procedure you will park in the [facility] AT [facility] and report directly to the cath lab to check in. At the information desk stay right and go all the way to the end of the rico, this will take you directly to your check in desk for the cath lab. Pre-Procedure Instructions   Do not eat or drink anything after midnight the day of your procedure. Take your Xarelto the morning of the procedure with sips of water. Do not use any lotions, creams or perfume the morning of procedure. Pre-procedure lab work will need to be completed 5-7  days prior to procedure. Please have a responsible adult to drive you home after procedure. It is recommended you do not drive for 24 hours after procedure and that someone stay with you for precautionary measures. Cath lab will provide you with all post procedure instructions. [address] [place] [address]/[facility] [address] [address]  Phone: [phone]  The hours are Mon -Fri. 6:30 am - 4:00 pm   Saturday 8:00 am - noon  No appointment necessary                      ------------------------------------------------------------------------------------------------------------------------    If ablation if recommended and you decide to proceed we will need to wait at least three months from the date of the ablation. If you have any question regarding your procedure please contact Nurse Mai at [phone].     Implantation of  BIV ICD  Pre procedure Instructions    Date:______________________________    Celia Bold at:__________________________    Procedure time:____________________    The morning of your procedure you will park in the hospital parking lot and report directly to the cath lab to check in. At the information desk stay right and go all the way to the end of the rico, this will take you directly to your check in desk for the cath lab. Pre-Procedure Instructions   You will need to fast (nothing to eat or drink) after midnight the day of your procedure  Do NOT chew gum or eat mints the day of your procedure  You will need to hold your Xarelto for 3 days prior to the procedure. .  Do not use any lotions, creams or perfume the morning of procedure. You will need to complete pre-procedure lab work 5-7 days prior to your procedure. Please have a responsible adult to drive you home after procedure, you should go home same day, but there is always a possibility of an overnight stay. Cath lab will provide you with all post procedure instructions                                          [address] [facility]  [address] [place] [address]  Phone: [phone]  The hours are Mon -Fri.   6:30 am - 4:00 pm   Saturday 8:00 am - noon  No appointment necessary

## 2023-03-01 ENCOUNTER — TELEPHONE (OUTPATIENT)
Dept: CARDIOLOGY CLINIC | Age: 73
End: 2023-03-01

## 2023-03-01 DIAGNOSIS — I48.0 PAF (PAROXYSMAL ATRIAL FIBRILLATION) (HCC): Primary | ICD-10-CM

## 2023-03-01 NOTE — TELEPHONE ENCOUNTER
Kevin estevez called wanting to schedule her Cardioversion. I informed Cristobal Chambers that Jerome schedules those procedures and will contact with an available date and time.     Cristobal Chambers is on the [employer] [employer] form, her callback number: [phone]

## 2023-03-01 NOTE — TELEPHONE ENCOUNTER
Please schedule DAVID /CV with Dr. Ortiz.    Anesthesia is NOT  needed   Echo tech is NOT needed for DAVID.      DAVID / Cardioversion Pre Procedure Instructions      Date:____________________________     Arrive at:_________________________     Procedure time:_____________________        The morning of your procedure you will park in the [facility] parking lot and report directly to the cath lab to check in.   At the information desk stay right and go all the way to the end of the rico, this will take you directly to your check in desk for the cath lab.     Pre-Procedure Instructions   Do not eat or drink anything after midnight the day of your procedure.   Take your Xarelto the morning of the procedure with sips of water.  Do not use any lotions, creams or perfume the morning of procedure.   Pre-procedure lab work will need to be completed 5-7  days prior to procedure.  Please have a responsible adult to drive you home after procedure. It is recommended you do not drive for 24 hours after procedure and that someone stay with you for precautionary measures.   Cath lab will provide you with all post procedure instructions.                                                     [facility]     [facility] [place] [facility]  [address] [facility] [address]  [address]   [place] [address]  Phone: [phone]  The hours are Mon -Fri.  6:30 am - 4:00 pm   Saturday 8:00 am - noon  No appointment necessary

## 2023-03-02 NOTE — TELEPHONE ENCOUNTER
Spoke with Rufino Stokes (Niece) and she stated that they wanted to go ahead with the DAVID/CV and ablation. I've passed information along to nurse and will call her back to get it scheduled shortly.

## 2023-03-02 NOTE — TELEPHONE ENCOUNTER
Dr Sid Donis would first like to see if we are to restore SR with cardioversion and if successful then proceed with ablation. I spoke with zenaida she verbalized understanding and agreeable with plan if ablation is indicated after CV will send message to schedule.

## 2023-03-02 NOTE — TELEPHONE ENCOUNTER
Spoke with the patient's niece and got her scheduled for the procedure. We went over instructions below and she vebalized understanding. DAVID / Cardioversion Pre Procedure Instructions      Date: 3/17/2023     Arrive at: 8:00 am   Procedure time: 9:00 am         The morning of your procedure you will park in the [facility] AT [facility] and report directly to the cath lab to check in. At the information desk stay right and go all the way to the end of the rico, this will take you directly to your check in desk for the cath lab. Pre-Procedure Instructions   Do not eat or drink anything after midnight the day of your procedure. Take your Xarelto the morning of the procedure with sips of water. Do not use any lotions, creams or perfume the morning of procedure. Pre-procedure lab work will need to be completed 5-7  days prior to procedure. Please have a responsible adult to drive you home after procedure. It is recommended you do not drive for 24 hours after procedure and that someone stay with you for precautionary measures. Cath lab will provide you with all post procedure instructions. [address] [place] [address]/[facility]  [address] [place] [address]  Phone: [phone]  The hours are Mon -Fri.   6:30 am - 4:00 pm   Saturday 8:00 am - noon  No appointment necessary

## 2023-03-13 DIAGNOSIS — I48.0 PAF (PAROXYSMAL ATRIAL FIBRILLATION) (HCC): ICD-10-CM

## 2023-03-13 LAB
ANION GAP SERPL CALCULATED.3IONS-SCNC: 13 MMOL/L (ref 3–16)
BUN BLDV-MCNC: 18 MG/DL (ref 7–20)
CALCIUM SERPL-MCNC: 9.5 MG/DL (ref 8.3–10.6)
CHLORIDE BLD-SCNC: 105 MMOL/L (ref 99–110)
CO2: 22 MMOL/L (ref 21–32)
CREAT SERPL-MCNC: 0.7 MG/DL (ref 0.6–1.2)
GFR SERPL CREATININE-BSD FRML MDRD: >60 ML/MIN/{1.73_M2}
GLUCOSE BLD-MCNC: 90 MG/DL (ref 70–99)
HCT VFR BLD CALC: 41.8 % (ref 36–48)
HEMOGLOBIN: 14.1 G/DL (ref 12–16)
MCH RBC QN AUTO: 29.7 PG (ref 26–34)
MCHC RBC AUTO-ENTMCNC: 33.8 G/DL (ref 31–36)
MCV RBC AUTO: 88 FL (ref 80–100)
PDW BLD-RTO: 15.2 % (ref 12.4–15.4)
PLATELET # BLD: 221 K/UL (ref 135–450)
PMV BLD AUTO: 8.9 FL (ref 5–10.5)
POTASSIUM SERPL-SCNC: 4.5 MMOL/L (ref 3.5–5.1)
RBC # BLD: 4.75 M/UL (ref 4–5.2)
SODIUM BLD-SCNC: 140 MMOL/L (ref 136–145)
WBC # BLD: 6.6 K/UL (ref 4–11)

## 2023-03-17 ENCOUNTER — HOSPITAL ENCOUNTER (OUTPATIENT)
Dept: CARDIAC CATH/INVASIVE PROCEDURES | Age: 73
Discharge: HOME | End: 2023-03-17
Payer: MEDICARE

## 2023-03-17 VITALS
RESPIRATION RATE: 16 BRPM | WEIGHT: 275 LBS | BODY MASS INDEX: 43.16 KG/M2 | DIASTOLIC BLOOD PRESSURE: 79 MMHG | SYSTOLIC BLOOD PRESSURE: 142 MMHG | HEART RATE: 67 BPM | OXYGEN SATURATION: 97 % | TEMPERATURE: 96.3 F | HEIGHT: 67 IN

## 2023-03-17 DIAGNOSIS — I48.0 PAF (PAROXYSMAL ATRIAL FIBRILLATION) (HCC): ICD-10-CM

## 2023-03-17 LAB
EKG ATRIAL RATE: 46 BPM
EKG DIAGNOSIS: NORMAL
EKG P AXIS: 81 DEGREES
EKG P-R INTERVAL: 164 MS
EKG Q-T INTERVAL: 482 MS
EKG QRS DURATION: 142 MS
EKG QTC CALCULATION (BAZETT): 421 MS
EKG R AXIS: -23 DEGREES
EKG T AXIS: -23 DEGREES
EKG VENTRICULAR RATE: 46 BPM

## 2023-03-17 PROCEDURE — 2500000003 HC RX 250 WO HCPCS

## 2023-03-17 PROCEDURE — 93005 ELECTROCARDIOGRAM TRACING: CPT | Performed by: INTERNAL MEDICINE

## 2023-03-17 PROCEDURE — 2580000003 HC RX 258

## 2023-03-17 PROCEDURE — 92960 CARDIOVERSION ELECTRIC EXT: CPT

## 2023-03-17 RX ORDER — SODIUM CHLORIDE 0.9 % (FLUSH) 0.9 %
5-40 SYRINGE (ML) INJECTION PRN
Status: DISCONTINUED | OUTPATIENT
Start: 2023-03-17 | End: 2023-03-18 | Stop reason: HOSPADM

## 2023-03-17 RX ORDER — SODIUM CHLORIDE 9 MG/ML
INJECTION, SOLUTION INTRAVENOUS PRN
Status: DISCONTINUED | OUTPATIENT
Start: 2023-03-17 | End: 2023-03-18 | Stop reason: HOSPADM

## 2023-03-17 RX ORDER — SODIUM CHLORIDE 0.9 % (FLUSH) 0.9 %
5-40 SYRINGE (ML) INJECTION EVERY 12 HOURS SCHEDULED
Status: DISCONTINUED | OUTPATIENT
Start: 2023-03-17 | End: 2023-03-18 | Stop reason: HOSPADM

## 2023-03-17 RX ORDER — METOPROLOL SUCCINATE 50 MG/1
25 TABLET, EXTENDED RELEASE ORAL DAILY
Qty: 90 TABLET | Refills: 3 | Status: SHIPPED | OUTPATIENT
Start: 2023-03-17

## 2023-03-17 NOTE — PRE SEDATION
Sedation Pre-Procedure Note    Patient Name: Shalom Smith   YOB: 1950  Room/Bed: Room/bed info not found  Medical Record Number: [id number]  Date: 3/17/2023   Time: 9:13 AM       Indication:  persistent atrial fibrillation    Consent: I have discussed with the patient and/or the patient representative the indication, alternatives, and the possible risks and/or complications of the planned procedure and the anesthesia methods. The patient and/or patient representative appear to understand and agree to proceed. Vital Signs:   Vitals:    23 0847   BP: (!) 142/79   Pulse: 67   Resp: 16   Temp: (!) 96.3 °F (35.7 °C)   SpO2: 97%       Past Medical History:   has a past medical history of Arthritis, Atrial fibrillation ([facility] [address] CHF (congestive heart failure) ([facility] [address] Depression, Hyperlipidemia, and Hypertension. Past Surgical History:   has a past surgical history that includes  section; Cardiac catheterization (); and hernia repair. Medications:   Scheduled Meds:    sodium chloride flush  5-40 mL IntraVENous 2 times per day     Continuous Infusions:    sodium chloride       PRN Meds: sodium chloride flush, sodium chloride  Home Meds:   Prior to Admission medications    Medication Sig Start Date End Date Taking?  Authorizing Provider   metoprolol succinate (TOPROL XL) 50 MG extended release tablet Take 1 tablet by mouth daily 23   Heaven Goldberg MD   empagliflozin (JARDIANCE) 10 MG tablet Take 1 tablet by mouth daily 23   Heaven Goldberg MD   rivaroxaban Rik Dove) 20 MG TABS tablet Take 1 tablet by mouth daily 10/24/22   Heaven Goldberg MD   sacubitril-valsartan (ENTRESTO) 24-26 MG per tablet Take 1 tablet by mouth 2 times daily 10/24/22   Heaven Goldberg MD   HYDROcodone-acetaminophen ([address]) 7.5-325 MG per tablet TAKE 1 TABLET BY MOUTH EVERY 6 HOURS AS NEEDED 22   Historical Provider, MD   QUEtiapine (SEROQUEL) 25 MG tablet Take 1 tablet by mouth nightly 22   Codie Bladimir Hoover MD   diclofenac sodium (VOLTAREN) 1 % GEL Apply topically 4 times daily as needed for Pain    Historical Provider, MD   simvastatin (ZOCOR) 20 MG tablet ONE PILL AT BEDTIME 3/14/16   Historical Provider, MD     Coumadin Use Last 7 Days:  no  Antiplatelet drug therapy use last 7 days: no  Other anticoagulant use last 7 days: yes - Rivaroxaban  Additional Medication Information:  n/a      Pre-Sedation Documentation and Exam:   I have personally completed a history, physical exam & review of systems for this patient (see notes).     Mallampati Airway Assessment:  Mallampati Class I - (soft palate, fauces, uvula & anterior/posterior tonsillar pillars are visible)    Prior History of Anesthesia Complications:   none    ASA Classification:  Class 2 - A normal healthy patient with mild systemic disease    Sedation/ Anesthesia Plan:   intravenous sedation    Medications Planned:   midazolam (Versed) intravenously, fentanyl intravenously, and Methohexital    Patient is an appropriate candidate for plan of sedation: yes    Electronically signed by Haritha Pride MD on 3/17/2023 at 9:13 AM

## 2023-03-17 NOTE — PROCEDURES
[address]     Electrophysiology Procedure Note       Date of Procedure: 3/17/2023  Patient's Name: Fe Alex  YOB: 1950   Medical Record Number: [id number]  Referring Physician: Kala sherman. providers found  Procedure Performed by: Fei Young MD    Procedures performed:  Anesthesia: Monitored Anesthesia Care  Level of sedation plan: Moderate sedation (conscious sedation) with intravenous Methohexital 40 mg  Sedation start time: 0940  Sedation stop time: 0941  Mallampati airway assessment class: I  ASA class: 1  External Electrical cardioversion     Indication of the procedure: Symptomatic, persistent atrial fibrillation      Details of procedure: The patient was brought to the cath lab area in a fasting and non-sedated state. The risks, benefits and alternatives of the procedure were discussed with the patient. The risks including, but not limited to, the risks of vascular injury, bleeding, infection, any pre-existing cardiac implantable electronic device malfunction, any pre-existing cardiac implantable electronic device's lead dislodgement, injury to cardiac and surrounding structures (including pneumothorax), stroke, myocardial infarction and death were discussed in detail. The patient was also counseled at length about the risks of nely Covid-19 in the segun-operative and post-operative states including the recovery window of their procedure. The patient was made aware that nely Covid-19 after a surgical procedure may worsen their prognosis for recovering from the virus and lend to a higher morbidity and or mortality risk. The patient was given the option of postponing their procedure. The patient was also presented reasonable alternatives to the proposed care, treatment, and services.  The discussion I have had with the patient encompassed risks, benefits, and side effects related to the alternatives and the risks related to not receiving the proposed care, treatment and services. The patient opted to proceed with the procedure. Written informed consent was signed and placed in the chart. A timeout protocol was completed to identify the patient and the procedure being performed. An independent trained observer assumed the sole responsibility of administering IV sedation medication - Versed, Fentanyl - at my direction and closely monitored the patient. Patient is on chronic anticoagulation therapy with Xarelto uninterrupted for at least the last 3 weeks. The patient was monitored continuously with ECG, pulse oximetry, blood pressure monitoring, and direct observation. We then administered intravenous Methohexital for sedation, and electrical DC cardioversion was performed using 200J, synchronized shock. Patient was converted to sinus rhythm immediately. Specimen collected: none       The patient tolerated the procedure well and there were no complications. Conclusion:   Successful external DC cardioversion of symptomatic, persistent atrial fibrillation. Plan:   The patient can be discharged if remains stable. Will continue with Xarelto 20mg daily and will start Amiodarone 200mg daily. The patient is also on cardiac anti-remodeling medications such as Toprol XL, Entresto. The patient will follow-up with Dr. Bhavna Kohli as an outpatient within 1 week to discuss other therapeutic options for atrial fibrillation, namely atrial fibrillation ablation. Thank you for allowing me to participate in the care of this patient. If you have any questions, please feel free to contact me.     Fei Young MD, MS, [facility]  Cardiac Electrophysiology  [address]  [address] [facility], [facility] [place] [address]  [phone]

## 2023-03-17 NOTE — DISCHARGE INSTRUCTIONS
CARDIOVERSION DISCHARGE INSTRUCTIONS    No driving for 24 hours. We strongly recommend that a responsible adult stay with you for the next 24 hours. Do not make important / legal decisions within 24 hours after procedures. Do not drink any alcoholic beverages or take sleeping pills for 24 hours    Continue your blood thinner as directed. Hydrocortisone 1% cream or aloe to reddened areas as needed.     Follow up appointment:   Dr. Getachew Johnson   On one week   Phone: [phone]

## 2023-03-17 NOTE — H&P
Cardiac Electrophysiology Consultation   Date: 3/17/2023  Reason for Consultation: atrial fibrillation, consideration of BIV ICD. Consult Requesting Physician:  Jeramie Aly MD  Primary Care Physician: Sharonda Gutierrez MD     Chief Complaint:       Chief Complaint   Patient presents with    New Patient    Chest Pain         HPI: Shashi Elena is a 81 y.o. patient with a history of systolic heart failure, atrial fibrillation, hypertension, hyperlipidemia, and TIA. She was hospitalized at [facility] 2022 after presenting with dizziness and vertigo. CT of head and MRI showed nothing acute. Echo showed LVEF 35 %. Of note she had Echo I  which also showed decreased LVED but she did not follow up with cardiologist. She underwent Left an right heart cath on 2022 which showed normal coronaries. Repeat Echo on 2023 showed LVEF remained depressed at 25-30 % despite optimized guideline directed medical therapy. Today, she presents to office for consideration of BIV ICD. EKG today shows atrial fibrillation, LBBB, v-rate 104 bpm. Symptomatic with fatigue and WHITFIELD. Patient does not endorse any identifiable exacerbating or alleviating factors for the atrial fibrillation. Reports compliance with medications and tolerating them well. Denies chest pain/pressure, tightness, edema, heart racing, palpitations, lightheadedness, dizziness, syncope, presyncope,  PND or orthopnea. Past Medical History        Past Medical History:   Diagnosis Date    Arthritis      Atrial fibrillation ([address]      CHF (congestive heart failure) ([address]      Depression      Hyperlipidemia      Hypertension              Past Surgical History         Past Surgical History:   Procedure Laterality Date    CARDIAC CATHETERIZATION        SECTION        HERNIA REPAIR                Allergies:        Allergies   Allergen Reactions    Pcn [Penicillins]           Medication:   Home Medications           Prior to Admission medications    Medication Sig Start Date End Date Taking? Authorizing Provider   metoprolol succinate (TOPROL XL) 50 MG extended release tablet Take 1 tablet by mouth daily 1/30/23   Yes Mamie Ng MD   empagliflozin (JARDIANCE) 10 MG tablet Take 1 tablet by mouth daily 1/30/23   Yes Mamie Ng MD   rivaroxaban (XARELTO) 20 MG TABS tablet Take 1 tablet by mouth daily 10/24/22   Yes Mamie Ng MD   sacubitril-valsartan (ENTRESTO) 24-26 MG per tablet Take 1 tablet by mouth 2 times daily 10/24/22   Yes Mamie Ng MD   HYDROcodone-acetaminophen ([place] [address]) 7.5-325 MG per tablet TAKE 1 TABLET BY MOUTH EVERY 6 HOURS AS NEEDED 7/27/22   Yes Historical Provider, MD   QUEtiapine (SEROQUEL) 25 MG tablet Take 1 tablet by mouth nightly 7/29/22   Yes Theodora Nearing, MD   diclofenac sodium (VOLTAREN) 1 % GEL Apply topically 4 times daily as needed for Pain     Yes Historical Provider, MD   simvastatin (ZOCOR) 20 MG tablet ONE PILL AT BEDTIME 3/14/16   Yes Historical Provider, MD            Social History:   reports that she has never smoked. She has never used smokeless tobacco. She reports that she does not drink alcohol and does not use drugs. Family History:  family history includes Diabetes in her maternal grandmother, maternal uncle, and mother; High Blood Pressure in her father; High Cholesterol in her father. Reviewed.  Denies family history of sudden cardiac death, arrhythmia, premature CAD     Review of System:     General ROS: negative for - chills, fever   Psychological ROS: negative for - anxiety or depression  Ophthalmic ROS: negative for - eye pain or loss of vision  ENT ROS: negative for - epistaxis, headaches, nasal discharge, sore throat   Allergy and Immunology ROS: negative for - hives, nasal congestion   Hematological and Lymphatic ROS: negative for - bleeding problems, blood clots, bruising or jaundice  Endocrine ROS: negative for - skin changes, temperature intolerance or unexpected weight changes  Respiratory ROS: negative for - cough, hemoptysis, pleuritic pain, SOB, sputum changes or wheezing  Cardiovascular ROS: Per HPI. Gastrointestinal ROS: negative for - abdominal pain, blood in stools, diarrhea, hematemesis, melena, nausea/vomiting or swallowing difficulty/pain  Genito-Urinary ROS: negative for - dysuria or incontinence  Musculoskeletal ROS: negative for - joint swelling or muscle pain  Neurological ROS: negative for - confusion, dizziness, gait disturbance, headaches, numbness/tingling, seizures, speech problems, tremors, visual changes or weakness  Dermatological ROS: negative for - rash     Physical Examination:      Vitals:     02/27/23 0929   BP: 130/74   Pulse: 86   SpO2: 96%         Constitutional: Oriented. No distress. Head: Normocephalic and atraumatic. Mouth/Throat: Oropharynx is clear and moist.   Eyes: Conjunctivae normal. EOM are normal.   Neck: Normal range of motion. Neck supple. No rigidity. No JVD present. Cardiovascular: Normal rate, irregular rhythm, S1&S2 and intact distal pulses. Pulmonary/Chest: Bilateral respiratory sounds. No wheezes. No rhonchi. Abdominal: Soft. Bowel sounds present. No distension, No tenderness. Musculoskeletal: No tenderness. No edema    Lymphadenopathy: Has no cervical adenopathy. Neurological: Alert and oriented. Cranial nerve appears intact, No Gross deficit   Skin: Skin is warm and dry. No rash noted. Psychiatric: Has a normal mood, affect and behavior      Labs:  Reviewed. ECG: Atrial fibrillation, LBBB with v-rate of 104 bpm with QRS duration 148 ms. No pathologic Q waves, ventricular pre-excitation, or QT prolongation. Studies:   1. Event monitor:         2. Echo: 01/24/2023  Suboptimal image quality. Definity contrast administered. Patient appears to be in atrial fibrillation. Overall left ventricular systolic function appears severely reduced.   Ejection fraction is visually estimated to be 25-30% with diffuse  hypokinesis and paradoxical septal motion due to left bundle branch block. Normal left ventricular wall thickness. Left ventricular cavity size is mildly dilated. The right ventricle is not well visualized but appears grossly normal in size and function. The left atrium is severely dilated. Trivial aortic and tricuspid regurgitation  LA volume/Index: 107 ml /46 ml/m2     3. Stress Test:          4. Cath:      I independently reviewed and interpreted the ECG, MCOT, echocardiogram, stress test, and coronary angiography/PCI results and used them for my plan of care. Procedures:  1. Assessment/Plan:      Chronic systolic heart failure  Non ischemic cardiomyopathy  - EF 35%, cath showed normal coronaries   - Repeat Echo 01/24/2023 resulting in reduced LVEF of 25-30%. - Continue optimized guideline directed medical therapy, which patient has been on for greater than three months. Beta Blocker: Toprol XL 50 mg daily. ACE/ ARNI/ARB: Entresto 24-26 mg BID              SGLT2 Inhibitor: Jardiance 10 mg daily              Aldosterone Antagonist: Not on due to hypotension.     - Euvolemic on today's exam.  - LBBB,  msec. Much time was spent with the patient discussing the pathophysiology of the patient's cardiomyopathy, the high risk of sudden cardiac death due to the cardiomyopathy, the utility of an implantable cardioverter defibrillator, the benefit and risks of the implantation of a BiV ICD, the benefits and risks of living with a BiV ICD, the lifestyle changes that come along with having a BiV ICD, and the logistics of BiV ICD care and generator changes that go along with having a BiV ICD.       The risks including, but not limited to, vascular injury, bleeding, infection, device malfunction, lead dislodgement, radiation exposure, injury to cardiac and surrounding structures (including pneumothorax), stroke, myocardial infarction and death were discussed in detail. The patient was also counseled at length about the risks of nely Covid-19 in the segun-operative and post-operative states including the recovery window of their procedure. The patient was made aware that nely Covid-19 after a surgical procedure may worsen their prognosis for recovering from the virus and lend to a higher morbidity and or mortality risk. The patient was given the option of postponing their procedure. The patient was also presented reasonable alternatives to the proposed care, treatment, and services. The discussion I have had with the patient encompassed risks, benefits, and side effects related to the alternatives and the risks related to not receiving the proposed care, treatment and services. All of the topics above were covered with the patient. The patient would like some time to deliberate further regarding procedure. If she wishes to proceed, he will contact our nurse, Stcay Aguilar at which time we will a [employer] BiV ICD implantation for primary prevention of sudden cardiac arrest and for heart failure management. Long standing persistent atrial fibrillation  - Symptomatic with fatigue and WHITFIELD.  - Patient has a [id number] Score 5 (HTN, CHF, TIA, AGE, GENDER)  - Continue Xarelto 20 daily for thromboembolic risk reduction.  - Patient has not been taking her Xarelto with food. Instructed to take with largest meal of the day. - Tolerating well no signs or symptoms of abnormal bruising or bleeding.  - Continue rate control strategy currently with beta-blocker. - Treatment options for atrial fibrillation including cardioversion, anti-arrhythmic medication therapy, rate control strategy, oral anticoagulation, and atrial fibrillation ablation were discussed with patient. Risks, benefits and alternative of each treatment options (care, treatment, and services) were explained.  The patient was also presented reasonable alternatives to the proposed care, treatment, and services. The discussion I have had with the patient encompassed risks, benefits, and side effects related to the alternatives and the risks related to not receiving the proposed care, treatment and services. All questions were answered. - I recommend that she undergo CV to see if SR can be successfully restored and then start on amiodarone. We educated the patient that atrial fibrillation is a worsening and progressive disease, with more frequent episodes that will ensue. Subsequent episodes usually become more sustained to the extent that many individuals would then develop persistent atrial fibrillation. Once persistence is reached, permanent atrial fibrillation is inevitable. We also discussed the fact that atrial fibrillation is associated with stroke, including life-threatening stroke, and therefore oral anticoagulation is warranted depending on the patient's [id number] score. We discussed different management options for atrial fibrillation including their risks and benefits. These options include use of cardioversion (mainly for persisting atrial fibrillation or atrial flutter) which provides an effective immediate therapy with success rates of 75% or higher, but it provides no short nor long term efficacy. Anti-arrhythmic medications provide a very effective short term therapy, but even with our most potent anti-arrhythmic medication there is limited long term efficacy (clinical studies have shown that 40% of patients remain atrial fibrillation-free after 4 years of follow-up after starting one of the more powerful anti-arrhythmic medication (amiodarone), and, if extrapolated, may have further diminishing success as time goes on). Atrial fibrillation ablation is a potentially curative therapy with very reasonable success rate after a first time procedure and with improving success rates with subsequent procedures.       The risks, benefits and alternatives of the atrial fibrillation ablation procedure were discussed with the patient. The risks including, but not limited to, bleeding, infection, radiation exposure, injury to vascular, cardiac and surrounding structures (including pneumothorax), stroke, cardiac perforation, tamponade, need for emergent open heart surgery, need for pacemaker implantation, injury to the phrenic nerve, injury to the esophagus, myocardial infarction and death were discussed in detail. The patient was also counseled at length about the risks of nely Covid-19 in the segun-operative and post-operative states including the recovery window of their procedure. The patient was made aware that nely Covid-19 after a surgical procedure may worsen their prognosis for recovering from the virus and lend to a higher morbidity and or mortality risk. The patient was given the option of postponing their procedure. The patient was also presented reasonable alternatives to the proposed care, treatment, and services. The discussion I have had with the patient encompassed risks, benefits, and side effects related to the alternatives and the risks related to not receiving the proposed care, treatment and services. I do not recommend ablation at his time but would first like to see if we are to restore SR with cardioversion and if successful then proceed with ablation if patient chooses to do so. - Much time was spent counseling the patient on healthier lifestyle, following a low sodium diet <2,000 mg (HF) of sodium a day and dramatically decreasing the intake of processed sugar < 24 grams for female and 36 grams male. - Patient educated to eat a diet which includes organic fruits, vegetables and meats.     - Encouraged to watch \"That Sugar Film\" which can be found on [employer] and other streaming services, which discusses the negative impact of processed sugar has on the body.         Essential hypertension  -controlled  -continue medical therapy     Hyperlipidemia  -Continue statin therapy, Zocor 20 mg daily. Tolerating well with no reported myalgias.   - LDL 67 (07/23/2022)     TIA   -continue statin therapy. Follow ups:  - Follow up one to two weeks after cardioversion if she decides to proceed. - Follow up three months after ablation procedure with myself if decides to proceed otherwise follow up PRN. -Follow up 1 week post BIV ICD  implantation in the device clinic for site check and device interrogation and 3 months after BIV ICD implantation with KEELEY King - CNP if he decides to proceed. If she does not proceed with ICD implantation, he will follow-up PRN. -Continue routine follow up with Arslan Rodas MD.     Thank you for allowing me to participate in the care of Nessa Alicea. All questions and concerns were addressed to the patient/family. Alternatives to my treatment were discussed. I have reviewed the history and physical and examined the patient and find no relevant changes. I have reviewed with the patient and/or family the risks and benefits to the proposed procedure. The patient was presented with the option of postponing the proposed procedure. The patient was also presented reasonable alternatives to the proposed care, treatment, and services. The discussion I have had with the patient encompassed risks, benefits, and side effects related to the alternatives and the risks related to not receiving the proposed care, treatment and services.       Haritha Pride MD, MS, [facility]  Cardiac Electrophysiology  [address]  [address] [place], [address] [place], [place] [address]  [address]  [phone]

## 2023-03-27 ENCOUNTER — OFFICE VISIT (OUTPATIENT)
Dept: CARDIOLOGY CLINIC | Age: 73
End: 2023-03-27
Payer: MEDICARE

## 2023-03-27 VITALS
DIASTOLIC BLOOD PRESSURE: 82 MMHG | WEIGHT: 274 LBS | HEART RATE: 57 BPM | OXYGEN SATURATION: 100 % | SYSTOLIC BLOOD PRESSURE: 134 MMHG | BODY MASS INDEX: 43 KG/M2 | HEIGHT: 67 IN

## 2023-03-27 DIAGNOSIS — I48.19 PERSISTENT ATRIAL FIBRILLATION (HCC): ICD-10-CM

## 2023-03-27 DIAGNOSIS — I42.8 NICM (NONISCHEMIC CARDIOMYOPATHY) (HCC): ICD-10-CM

## 2023-03-27 DIAGNOSIS — E78.5 HYPERLIPIDEMIA LDL GOAL <100: ICD-10-CM

## 2023-03-27 DIAGNOSIS — I50.22 CHRONIC SYSTOLIC HEART FAILURE (HCC): Primary | ICD-10-CM

## 2023-03-27 DIAGNOSIS — G45.9 TIA (TRANSIENT ISCHEMIC ATTACK): ICD-10-CM

## 2023-03-27 DIAGNOSIS — Z79.01 CURRENT USE OF ANTICOAGULANT THERAPY: ICD-10-CM

## 2023-03-27 DIAGNOSIS — I10 ESSENTIAL HYPERTENSION: ICD-10-CM

## 2023-03-27 PROCEDURE — 3017F COLORECTAL CA SCREEN DOC REV: CPT | Performed by: INTERNAL MEDICINE

## 2023-03-27 PROCEDURE — G8427 DOCREV CUR MEDS BY ELIG CLIN: HCPCS | Performed by: INTERNAL MEDICINE

## 2023-03-27 PROCEDURE — 1090F PRES/ABSN URINE INCON ASSESS: CPT | Performed by: INTERNAL MEDICINE

## 2023-03-27 PROCEDURE — G8417 CALC BMI ABV UP PARAM F/U: HCPCS | Performed by: INTERNAL MEDICINE

## 2023-03-27 PROCEDURE — 1036F TOBACCO NON-USER: CPT | Performed by: INTERNAL MEDICINE

## 2023-03-27 PROCEDURE — 3079F DIAST BP 80-89 MM HG: CPT | Performed by: INTERNAL MEDICINE

## 2023-03-27 PROCEDURE — 93000 ELECTROCARDIOGRAM COMPLETE: CPT | Performed by: INTERNAL MEDICINE

## 2023-03-27 PROCEDURE — 1123F ACP DISCUSS/DSCN MKR DOCD: CPT | Performed by: INTERNAL MEDICINE

## 2023-03-27 PROCEDURE — G8484 FLU IMMUNIZE NO ADMIN: HCPCS | Performed by: INTERNAL MEDICINE

## 2023-03-27 PROCEDURE — 3075F SYST BP GE 130 - 139MM HG: CPT | Performed by: INTERNAL MEDICINE

## 2023-03-27 PROCEDURE — G8400 PT W/DXA NO RESULTS DOC: HCPCS | Performed by: INTERNAL MEDICINE

## 2023-03-27 PROCEDURE — 99215 OFFICE O/P EST HI 40 MIN: CPT | Performed by: INTERNAL MEDICINE

## 2023-03-27 NOTE — PROGRESS NOTES
patient/family. Alternatives to my treatment were discussed. This note was scribed in the presence of Dr. Dave Husain MD by Nisha Flaherty, ALYSSA. The scribe's documentation has been prepared under my direction and personally reviewed by me in its entirety. I confirm that the note above accurately reflects all work, physical examination, the discussion of treatments and procedures, and medical decision making performed by me.     Dave Husain MD, MS, [facility]  Cardiac Electrophysiology  [address]  [address] [facility], [address] [facility]  [address] [facility] [address]  [phone]

## 2023-03-27 NOTE — PATIENT INSTRUCTIONS
LARON Ren post procedure                                        [address] [facility] [address]  Phone: [phone]  The hours are Mon -Fri. 6:30 am - 4:00 pm   Saturday 8:00 am - noon  No appointment necessary         ----------------------------------------------------------------------------------------------------------------------------  We will need to wait at least three months from the date of the ablation before proceeding with BIV ICD implantation. If you have any question regarding your procedure please contact Nurse Mai at [phone]. Implantation of  BIV ICD  Pre procedure Instructions     Date:______________________________     Arrive at:__________________________     Procedure time:____________________     The morning of your procedure you will park in the hospital parking lot and report directly to the cath lab to check in. At the information desk stay right and go all the way to the end of the rico, this will take you directly to your check in desk for the cath lab. Pre-Procedure Instructions   You will need to fast (nothing to eat or drink) after midnight the day of your procedure  Do NOT chew gum or eat mints the day of your procedure  You will need to hold your Xarelto for 3 days prior to the procedure. .  Do not use any lotions, creams or perfume the morning of procedure. You will need to complete pre-procedure lab work 5-7 days prior to your procedure. Please have a responsible adult to drive you home after procedure, you should go home same day, but there is always a possibility of an overnight stay. Cath lab will provide you with all post procedure instructions       [address] [place] [address]/[facility] [address] [place] [address]  Phone: [phone]  The hours are Mon -Fri.   6:30 am - 4:00 pm   Saturday 8:00 am - noon  No

## 2023-04-22 ENCOUNTER — HOSPITAL ENCOUNTER (OUTPATIENT)
Dept: CT IMAGING | Age: 73
Discharge: HOME | End: 2023-04-22
Payer: MEDICARE

## 2023-04-22 ENCOUNTER — HOSPITAL ENCOUNTER (OUTPATIENT)
Age: 73
Discharge: HOME | End: 2023-04-22
Payer: MEDICARE

## 2023-04-22 DIAGNOSIS — I48.19 PERSISTENT ATRIAL FIBRILLATION (HCC): ICD-10-CM

## 2023-04-22 LAB
ABO + RH BLD: NORMAL
ANION GAP SERPL CALCULATED.3IONS-SCNC: 11 MMOL/L (ref 3–16)
BLD GP AB SCN SERPL QL: NORMAL
BUN SERPL-MCNC: 17 MG/DL (ref 7–20)
CALCIUM SERPL-MCNC: 9.3 MG/DL (ref 8.3–10.6)
CHLORIDE SERPL-SCNC: 103 MMOL/L (ref 99–110)
CO2 SERPL-SCNC: 24 MMOL/L (ref 21–32)
CREAT SERPL-MCNC: 0.8 MG/DL (ref 0.6–1.2)
DEPRECATED RDW RBC AUTO: 15.4 % (ref 12.4–15.4)
GFR SERPLBLD CREATININE-BSD FMLA CKD-EPI: >60 ML/MIN/{1.73_M2}
GLUCOSE SERPL-MCNC: 99 MG/DL (ref 70–99)
HCT VFR BLD AUTO: 42 % (ref 36–48)
HGB BLD-MCNC: 14 G/DL (ref 12–16)
MCH RBC QN AUTO: 29.9 PG (ref 26–34)
MCHC RBC AUTO-ENTMCNC: 33.3 G/DL (ref 31–36)
MCV RBC AUTO: 90 FL (ref 80–100)
PLATELET # BLD AUTO: 248 K/UL (ref 135–450)
PMV BLD AUTO: 8.8 FL (ref 5–10.5)
POTASSIUM SERPL-SCNC: 4.2 MMOL/L (ref 3.5–5.1)
RBC # BLD AUTO: 4.67 M/UL (ref 4–5.2)
SODIUM SERPL-SCNC: 138 MMOL/L (ref 136–145)
WBC # BLD AUTO: 7.7 K/UL (ref 4–11)

## 2023-04-22 PROCEDURE — 36415 COLL VENOUS BLD VENIPUNCTURE: CPT

## 2023-04-22 PROCEDURE — 85027 COMPLETE CBC AUTOMATED: CPT

## 2023-04-22 PROCEDURE — 80048 BASIC METABOLIC PNL TOTAL CA: CPT

## 2023-04-22 PROCEDURE — 86900 BLOOD TYPING SEROLOGIC ABO: CPT

## 2023-04-22 PROCEDURE — 86850 RBC ANTIBODY SCREEN: CPT

## 2023-04-22 PROCEDURE — 75574 CT ANGIO HRT W/3D IMAGE: CPT

## 2023-04-22 PROCEDURE — 6360000004 HC RX CONTRAST MEDICATION: Performed by: INTERNAL MEDICINE

## 2023-04-22 PROCEDURE — 86901 BLOOD TYPING SEROLOGIC RH(D): CPT

## 2023-04-22 RX ADMIN — IOPAMIDOL 75 ML: 755 INJECTION, SOLUTION INTRAVENOUS at 11:31

## 2023-04-26 ENCOUNTER — TELEPHONE (OUTPATIENT)
Dept: CARDIOLOGY CLINIC | Age: 73
End: 2023-04-26

## 2023-04-26 NOTE — PROGRESS NOTES
Called patient about procedure. Told to be here at 0630 for procedure at 0730. Must be NPO after midnight but can take morning medication with sips of water. Patient stated they will hold blood thinner morning of procedure as directed by office. Told to have a responsible adult with them to take them home and stay with them afterwards, if they do not get admitted to hospital. Also, to bring a current list of medications. Pt reporting that she is currently having \"a lot of UTI symptoms\" and \"has been through so many pads the past couple of days\". Pt instructed to inform primary care doctor about symptoms. Called Dr. Elías Kraus office regarding patient's symptoms and concerns. Spoke with Garima Bergman and left a message for the nurse at the office. 1:39 PM Spoke with nurse at Dr. Elías Kraus office, was told that patient will absolutely need to reschedule. Patient updated and informed that she must be rescheduled.  Pt given Dr. Elías Kraus office phone number

## 2023-04-27 ENCOUNTER — ANESTHESIA (OUTPATIENT)
Dept: CARDIAC CATH/INVASIVE PROCEDURES | Age: 73
End: 2023-04-27

## 2023-04-27 ENCOUNTER — HOSPITAL ENCOUNTER (OUTPATIENT)
Dept: CARDIAC CATH/INVASIVE PROCEDURES | Age: 73
Discharge: HOME | End: 2023-04-29
Payer: MEDICARE

## 2023-04-27 ENCOUNTER — HOSPITAL ENCOUNTER (OUTPATIENT)
Dept: NON INVASIVE DIAGNOSTICS | Age: 73
Discharge: HOME | End: 2023-04-27
Payer: MEDICARE

## 2023-04-27 ENCOUNTER — ANESTHESIA EVENT (OUTPATIENT)
Dept: CARDIAC CATH/INVASIVE PROCEDURES | Age: 73
End: 2023-04-27

## 2023-04-27 VITALS
RESPIRATION RATE: 17 BRPM | SYSTOLIC BLOOD PRESSURE: 108 MMHG | BODY MASS INDEX: 45.32 KG/M2 | TEMPERATURE: 97 F | DIASTOLIC BLOOD PRESSURE: 62 MMHG | HEART RATE: 66 BPM | OXYGEN SATURATION: 98 % | HEIGHT: 65 IN | WEIGHT: 272 LBS

## 2023-04-27 LAB
INR PPP: 1.59 (ref 0.84–1.16)
POC ACT LR: 325 SEC
POC ACT LR: 334 SEC
POC ACT LR: 348 SEC
POC ACT LR: 393 SEC
POC ACT LR: >400 SEC
POC ACT LR: >400 SEC
PROTHROMBIN TIME: 18.9 SEC (ref 11.5–14.8)

## 2023-04-27 PROCEDURE — 2500000003 HC RX 250 WO HCPCS: Performed by: STUDENT IN AN ORGANIZED HEALTH CARE EDUCATION/TRAINING PROGRAM

## 2023-04-27 PROCEDURE — 2709999900 HC NON-CHARGEABLE SUPPLY

## 2023-04-27 PROCEDURE — C1732 CATH, EP, DIAG/ABL, 3D/VECT: HCPCS

## 2023-04-27 PROCEDURE — 93005 ELECTROCARDIOGRAM TRACING: CPT | Performed by: INTERNAL MEDICINE

## 2023-04-27 PROCEDURE — 2500000003 HC RX 250 WO HCPCS

## 2023-04-27 PROCEDURE — 3700000000 HC ANESTHESIA ATTENDED CARE

## 2023-04-27 PROCEDURE — 93656 COMPRE EP EVAL ABLTJ ATR FIB: CPT

## 2023-04-27 PROCEDURE — 2580000003 HC RX 258: Performed by: NURSE ANESTHETIST, CERTIFIED REGISTERED

## 2023-04-27 PROCEDURE — C1730 CATH, EP, 19 OR FEW ELECT: HCPCS

## 2023-04-27 PROCEDURE — 6360000002 HC RX W HCPCS: Performed by: STUDENT IN AN ORGANIZED HEALTH CARE EDUCATION/TRAINING PROGRAM

## 2023-04-27 PROCEDURE — C1894 INTRO/SHEATH, NON-LASER: HCPCS

## 2023-04-27 PROCEDURE — 6360000002 HC RX W HCPCS: Performed by: NURSE ANESTHETIST, CERTIFIED REGISTERED

## 2023-04-27 PROCEDURE — 93657 TX L/R ATRIAL FIB ADDL: CPT

## 2023-04-27 PROCEDURE — C1766 INTRO/SHEATH,STRBLE,NON-PEEL: HCPCS

## 2023-04-27 PROCEDURE — C1759 CATH, INTRA ECHOCARDIOGRAPHY: HCPCS

## 2023-04-27 PROCEDURE — 2500000003 HC RX 250 WO HCPCS: Performed by: NURSE ANESTHETIST, CERTIFIED REGISTERED

## 2023-04-27 PROCEDURE — 7100000000 HC PACU RECOVERY - FIRST 15 MIN

## 2023-04-27 PROCEDURE — 6360000002 HC RX W HCPCS: Performed by: ANESTHESIOLOGY

## 2023-04-27 PROCEDURE — 85347 COAGULATION TIME ACTIVATED: CPT

## 2023-04-27 PROCEDURE — 93320 DOPPLER ECHO COMPLETE: CPT

## 2023-04-27 PROCEDURE — 93312 ECHO TRANSESOPHAGEAL: CPT

## 2023-04-27 PROCEDURE — 3700000001 HC ADD 15 MINUTES (ANESTHESIA)

## 2023-04-27 PROCEDURE — 6360000002 HC RX W HCPCS

## 2023-04-27 PROCEDURE — 7100000001 HC PACU RECOVERY - ADDTL 15 MIN

## 2023-04-27 PROCEDURE — 93325 DOPPLER ECHO COLOR FLOW MAPG: CPT

## 2023-04-27 PROCEDURE — 93655 ICAR CATH ABLTJ DSCRT ARRHYT: CPT

## 2023-04-27 PROCEDURE — 85610 PROTHROMBIN TIME: CPT

## 2023-04-27 PROCEDURE — 93623 PRGRMD STIMJ&PACG IV RX NFS: CPT

## 2023-04-27 PROCEDURE — 93622 COMP EP EVAL L VENTR PAC&REC: CPT

## 2023-04-27 RX ORDER — FUROSEMIDE 10 MG/ML
INJECTION INTRAMUSCULAR; INTRAVENOUS PRN
Status: DISCONTINUED | OUTPATIENT
Start: 2023-04-27 | End: 2023-04-27 | Stop reason: SDUPTHER

## 2023-04-27 RX ORDER — HEPARIN SODIUM 1000 [USP'U]/ML
INJECTION, SOLUTION INTRAVENOUS; SUBCUTANEOUS PRN
Status: DISCONTINUED | OUTPATIENT
Start: 2023-04-27 | End: 2023-04-27 | Stop reason: SDUPTHER

## 2023-04-27 RX ORDER — NITROFURANTOIN 25; 75 MG/1; MG/1
100 CAPSULE ORAL 2 TIMES DAILY
Status: DISCONTINUED | OUTPATIENT
Start: 2023-04-27 | End: 2023-04-28

## 2023-04-27 RX ORDER — SODIUM CHLORIDE, SODIUM LACTATE, POTASSIUM CHLORIDE, CALCIUM CHLORIDE 600; 310; 30; 20 MG/100ML; MG/100ML; MG/100ML; MG/100ML
INJECTION, SOLUTION INTRAVENOUS CONTINUOUS PRN
Status: DISCONTINUED | OUTPATIENT
Start: 2023-04-27 | End: 2023-04-27 | Stop reason: SDUPTHER

## 2023-04-27 RX ORDER — HEPARIN SODIUM 10000 [USP'U]/100ML
INJECTION, SOLUTION INTRAVENOUS CONTINUOUS PRN
Status: DISCONTINUED | OUTPATIENT
Start: 2023-04-27 | End: 2023-04-27 | Stop reason: SDUPTHER

## 2023-04-27 RX ORDER — SODIUM CHLORIDE 9 MG/ML
INJECTION, SOLUTION INTRAVENOUS PRN
Status: DISCONTINUED | OUTPATIENT
Start: 2023-04-27 | End: 2023-04-28

## 2023-04-27 RX ORDER — SODIUM CHLORIDE 0.9 % (FLUSH) 0.9 %
5-40 SYRINGE (ML) INJECTION EVERY 12 HOURS SCHEDULED
Status: DISCONTINUED | OUTPATIENT
Start: 2023-04-27 | End: 2023-04-28

## 2023-04-27 RX ORDER — PROPOFOL 10 MG/ML
INJECTION, EMULSION INTRAVENOUS PRN
Status: DISCONTINUED | OUTPATIENT
Start: 2023-04-27 | End: 2023-04-27 | Stop reason: SDUPTHER

## 2023-04-27 RX ORDER — ROCURONIUM BROMIDE 10 MG/ML
INJECTION, SOLUTION INTRAVENOUS PRN
Status: DISCONTINUED | OUTPATIENT
Start: 2023-04-27 | End: 2023-04-27 | Stop reason: SDUPTHER

## 2023-04-27 RX ORDER — SODIUM CHLORIDE 0.9 % (FLUSH) 0.9 %
5-40 SYRINGE (ML) INJECTION PRN
Status: DISCONTINUED | OUTPATIENT
Start: 2023-04-27 | End: 2023-04-28

## 2023-04-27 RX ORDER — LIDOCAINE HYDROCHLORIDE AND EPINEPHRINE BITARTRATE 20; .01 MG/ML; MG/ML
15 INJECTION, SOLUTION SUBCUTANEOUS SEE ADMIN INSTRUCTIONS
Status: DISCONTINUED | OUTPATIENT
Start: 2023-04-27 | End: 2023-04-28

## 2023-04-27 RX ORDER — FENTANYL CITRATE 50 UG/ML
25 INJECTION, SOLUTION INTRAMUSCULAR; INTRAVENOUS EVERY 5 MIN PRN
Status: DISCONTINUED | OUTPATIENT
Start: 2023-04-27 | End: 2023-04-28

## 2023-04-27 RX ORDER — DOPAMINE HYDROCHLORIDE 160 MG/100ML
INJECTION, SOLUTION INTRAVENOUS CONTINUOUS PRN
Status: DISCONTINUED | OUTPATIENT
Start: 2023-04-27 | End: 2023-04-27 | Stop reason: SDUPTHER

## 2023-04-27 RX ORDER — IPRATROPIUM BROMIDE AND ALBUTEROL SULFATE 2.5; .5 MG/3ML; MG/3ML
1 SOLUTION RESPIRATORY (INHALATION)
Status: DISCONTINUED | OUTPATIENT
Start: 2023-04-27 | End: 2023-04-28

## 2023-04-27 RX ORDER — NALOXONE HYDROCHLORIDE 4 MG/.1ML
4 SPRAY NASAL
COMMUNITY
Start: 2022-06-29 | End: 2023-06-29

## 2023-04-27 RX ORDER — PROCHLORPERAZINE EDISYLATE 5 MG/ML
5 INJECTION INTRAMUSCULAR; INTRAVENOUS
Status: DISCONTINUED | OUTPATIENT
Start: 2023-04-27 | End: 2023-04-28

## 2023-04-27 RX ORDER — AMIODARONE HYDROCHLORIDE 100 MG/1
100 TABLET ORAL DAILY
Qty: 30 TABLET | Refills: 5 | Status: SHIPPED | OUTPATIENT
Start: 2023-04-27 | End: 2023-04-28

## 2023-04-27 RX ORDER — PROTAMINE SULFATE 10 MG/ML
INJECTION, SOLUTION INTRAVENOUS PRN
Status: DISCONTINUED | OUTPATIENT
Start: 2023-04-27 | End: 2023-04-27 | Stop reason: SDUPTHER

## 2023-04-27 RX ORDER — 0.9 % SODIUM CHLORIDE 0.9 %
1000 INTRAVENOUS SOLUTION INTRAVENOUS
Status: DISCONTINUED | OUTPATIENT
Start: 2023-04-27 | End: 2023-04-28

## 2023-04-27 RX ORDER — LABETALOL HYDROCHLORIDE 5 MG/ML
10 INJECTION, SOLUTION INTRAVENOUS
Status: DISCONTINUED | OUTPATIENT
Start: 2023-04-27 | End: 2023-04-28

## 2023-04-27 RX ORDER — NITROFURANTOIN 25; 75 MG/1; MG/1
100 CAPSULE ORAL 2 TIMES DAILY
COMMUNITY
Start: 2023-04-26

## 2023-04-27 RX ORDER — SIMVASTATIN 20 MG
20 TABLET ORAL NIGHTLY
Status: DISCONTINUED | OUTPATIENT
Start: 2023-04-27 | End: 2023-04-28

## 2023-04-27 RX ORDER — METOPROLOL SUCCINATE 25 MG/1
25 TABLET, EXTENDED RELEASE ORAL DAILY
Status: DISCONTINUED | OUTPATIENT
Start: 2023-04-27 | End: 2023-04-28

## 2023-04-27 RX ORDER — EPHEDRINE SULFATE 50 MG/ML
INJECTION INTRAVENOUS PRN
Status: DISCONTINUED | OUTPATIENT
Start: 2023-04-27 | End: 2023-04-27 | Stop reason: SDUPTHER

## 2023-04-27 RX ORDER — HYDROCODONE BITARTRATE AND ACETAMINOPHEN 7.5; 325 MG/1; MG/1
1 TABLET ORAL EVERY 6 HOURS PRN
Status: DISCONTINUED | OUTPATIENT
Start: 2023-04-27 | End: 2023-04-28

## 2023-04-27 RX ORDER — ACETAMINOPHEN 325 MG/1
650 TABLET ORAL EVERY 4 HOURS PRN
Status: DISCONTINUED | OUTPATIENT
Start: 2023-04-27 | End: 2023-04-28

## 2023-04-27 RX ORDER — NYSTATIN 100000 [USP'U]/G
POWDER TOPICAL
COMMUNITY
Start: 2022-12-01

## 2023-04-27 RX ORDER — ESMOLOL HYDROCHLORIDE 10 MG/ML
INJECTION INTRAVENOUS PRN
Status: DISCONTINUED | OUTPATIENT
Start: 2023-04-27 | End: 2023-04-27 | Stop reason: SDUPTHER

## 2023-04-27 RX ORDER — ACETAMINOPHEN 325 MG/1
650 TABLET ORAL
Status: DISCONTINUED | OUTPATIENT
Start: 2023-04-27 | End: 2023-04-28

## 2023-04-27 RX ORDER — ONDANSETRON 2 MG/ML
4 INJECTION INTRAMUSCULAR; INTRAVENOUS
Status: DISCONTINUED | OUTPATIENT
Start: 2023-04-27 | End: 2023-04-28

## 2023-04-27 RX ORDER — PROTAMINE SULFATE 10 MG/ML
30 INJECTION, SOLUTION INTRAVENOUS
Status: DISCONTINUED | OUTPATIENT
Start: 2023-04-27 | End: 2023-04-28

## 2023-04-27 RX ORDER — ONDANSETRON 2 MG/ML
INJECTION INTRAMUSCULAR; INTRAVENOUS PRN
Status: DISCONTINUED | OUTPATIENT
Start: 2023-04-27 | End: 2023-04-27 | Stop reason: SDUPTHER

## 2023-04-27 RX ADMIN — ESMOLOL HYDROCHLORIDE 15 MG: 10 INJECTION, SOLUTION INTRAVENOUS at 11:42

## 2023-04-27 RX ADMIN — EPHEDRINE SULFATE 10 MG: 50 INJECTION INTRAVENOUS at 13:39

## 2023-04-27 RX ADMIN — DOPAMINE HYDROCHLORIDE IN DEXTROSE 10 MCG/KG/MIN: 1.6 INJECTION, SOLUTION INTRAVENOUS at 14:20

## 2023-04-27 RX ADMIN — HEPARIN SODIUM 12000 UNITS/HR: 10000 INJECTION, SOLUTION INTRAVENOUS at 13:12

## 2023-04-27 RX ADMIN — PROPOFOL 50 MG: 10 INJECTION, EMULSION INTRAVENOUS at 14:37

## 2023-04-27 RX ADMIN — PROTAMINE SULFATE 10 MG: 10 INJECTION, SOLUTION INTRAVENOUS at 14:46

## 2023-04-27 RX ADMIN — ONDANSETRON 4 MG: 2 INJECTION INTRAMUSCULAR; INTRAVENOUS at 14:55

## 2023-04-27 RX ADMIN — ROCURONIUM BROMIDE 50 MG: 10 INJECTION INTRAVENOUS at 13:45

## 2023-04-27 RX ADMIN — ROCURONIUM BROMIDE 10 MG: 10 INJECTION INTRAVENOUS at 11:32

## 2023-04-27 RX ADMIN — PROTAMINE SULFATE 50 MG: 10 INJECTION, SOLUTION INTRAVENOUS at 14:50

## 2023-04-27 RX ADMIN — PROTAMINE SULFATE 10 MG: 10 INJECTION, SOLUTION INTRAVENOUS at 14:47

## 2023-04-27 RX ADMIN — EPHEDRINE SULFATE 10 MG: 50 INJECTION INTRAVENOUS at 13:42

## 2023-04-27 RX ADMIN — FUROSEMIDE 60 MG: 10 INJECTION, SOLUTION INTRAMUSCULAR; INTRAVENOUS at 14:47

## 2023-04-27 RX ADMIN — SUGAMMADEX 200 MG: 100 INJECTION, SOLUTION INTRAVENOUS at 15:01

## 2023-04-27 RX ADMIN — SUGAMMADEX 200 MG: 100 INJECTION, SOLUTION INTRAVENOUS at 14:53

## 2023-04-27 RX ADMIN — FENTANYL CITRATE 25 MCG: 50 INJECTION, SOLUTION INTRAMUSCULAR; INTRAVENOUS at 15:43

## 2023-04-27 RX ADMIN — PROTAMINE SULFATE 20 MG: 10 INJECTION, SOLUTION INTRAVENOUS at 14:49

## 2023-04-27 RX ADMIN — HEPARIN SODIUM 12000 UNITS: 1000 INJECTION INTRAVENOUS; SUBCUTANEOUS at 12:26

## 2023-04-27 RX ADMIN — EPHEDRINE SULFATE 10 MG: 50 INJECTION INTRAVENOUS at 11:48

## 2023-04-27 RX ADMIN — ROCURONIUM BROMIDE 90 MG: 10 INJECTION INTRAVENOUS at 11:34

## 2023-04-27 RX ADMIN — PROPOFOL 20 MG: 10 INJECTION, EMULSION INTRAVENOUS at 12:00

## 2023-04-27 RX ADMIN — ESMOLOL HYDROCHLORIDE 15 MG: 10 INJECTION, SOLUTION INTRAVENOUS at 11:39

## 2023-04-27 RX ADMIN — SODIUM CHLORIDE, SODIUM LACTATE, POTASSIUM CHLORIDE, AND CALCIUM CHLORIDE: .6; .31; .03; .02 INJECTION, SOLUTION INTRAVENOUS at 11:21

## 2023-04-27 RX ADMIN — ROCURONIUM BROMIDE 10 MG: 10 INJECTION INTRAVENOUS at 14:26

## 2023-04-27 RX ADMIN — PHENYLEPHRINE HYDROCHLORIDE 40 MCG/MIN: 10 INJECTION INTRAVENOUS at 11:51

## 2023-04-27 RX ADMIN — PROTAMINE SULFATE 50 MG: 10 INJECTION, SOLUTION INTRAVENOUS at 14:52

## 2023-04-27 RX ADMIN — PROPOFOL 50 MG: 10 INJECTION, EMULSION INTRAVENOUS at 13:45

## 2023-04-27 RX ADMIN — PROPOFOL 130 MG: 10 INJECTION, EMULSION INTRAVENOUS at 11:33

## 2023-04-27 ASSESSMENT — PAIN DESCRIPTION - ORIENTATION: ORIENTATION: MID

## 2023-04-27 ASSESSMENT — ENCOUNTER SYMPTOMS: DYSPNEA ACTIVITY LEVEL: AFTER AMBULATING 1 FLIGHT OF STAIRS

## 2023-04-27 ASSESSMENT — PAIN DESCRIPTION - LOCATION: LOCATION: BACK

## 2023-04-27 ASSESSMENT — PAIN SCALES - GENERAL
PAINLEVEL_OUTOF10: 0
PAINLEVEL_OUTOF10: 5

## 2023-04-27 ASSESSMENT — LIFESTYLE VARIABLES: SMOKING_STATUS: 0

## 2023-04-27 ASSESSMENT — PAIN DESCRIPTION - DESCRIPTORS: DESCRIPTORS: DISCOMFORT

## 2023-04-27 NOTE — PROGRESS NOTES
Patient to pacu 6 s/p a- fib ablation, report received from CRNA, reported hemodynamically stable intr aop, all vitals stable upon arrival patient in sinus rhythm.  Pedal pulse palpable and right groin site clean dry and intact

## 2023-04-27 NOTE — ANESTHESIA POSTPROCEDURE EVALUATION
Department of Anesthesiology  Postprocedure Note    Patient: Kash Ingram  MRN: [id number]  YOB: 1950  Date of evaluation: 4/27/2023      Procedure Summary     Date: 04/27/23 Room / Location: [facility] Cath Lab    Anesthesia Start: 0396 Anesthesia Stop: 9137    Procedure: Ulanda Dago A-FIB ABLATION W ANES Diagnosis:     Scheduled Providers:  Responsible Provider: Rudi Anna DO    Anesthesia Type: general ASA Status: 4          Anesthesia Type: No value filed.     Marina Phase I: Marina Score: 10    Marina Phase II:        Anesthesia Post Evaluation    Patient location during evaluation: PACU  Patient participation: complete - patient participated  Level of consciousness: awake  Pain score: 0  Nausea & Vomiting: no nausea and no vomiting  Complications: no  Cardiovascular status: blood pressure returned to baseline  Respiratory status: acceptable  Hydration status: euvolemic

## 2023-04-27 NOTE — ANESTHESIA PRE PROCEDURE
Answered      Vital Signs (Current):   Vitals:    04/27/23 1017   Temp: 97.5 °F (36.4 °C)   TempSrc: Oral   Weight: 272 lb (123.4 kg)   Height: 5' 5\" (1.651 m)                                              BP Readings from Last 3 Encounters:   03/27/23 134/82   03/17/23 (!) 142/79   02/27/23 130/74       NPO Status: Time of last liquid consumption: 2300                        Time of last solid consumption: 2300                                                      BMI:   Wt Readings from Last 3 Encounters:   04/27/23 272 lb (123.4 kg)   03/27/23 274 lb (124.3 kg)   03/17/23 275 lb (124.7 kg)     Body mass index is 45.26 kg/m². CBC:   Lab Results   Component Value Date/Time    WBC 7.7 04/22/2023 11:36 AM    RBC 4.67 04/22/2023 11:36 AM    HGB 14.0 04/22/2023 11:36 AM    HCT 42.0 04/22/2023 11:36 AM    MCV 90.0 04/22/2023 11:36 AM    RDW 15.4 04/22/2023 11:36 AM     04/22/2023 11:36 AM       CMP:   Lab Results   Component Value Date/Time     04/22/2023 11:36 AM    K 4.2 04/22/2023 11:36 AM    K 4.4 06/15/2022 01:43 PM     04/22/2023 11:36 AM    CO2 24 04/22/2023 11:36 AM    BUN 17 04/22/2023 11:36 AM    CREATININE 0.8 04/22/2023 11:36 AM    GFRAA >60 09/16/2022 10:09 AM    GFRAA >60 01/18/2011 11:50 AM    AGRATIO 0.9 07/28/2022 01:32 PM    LABGLOM >60 04/22/2023 11:36 AM    GLUCOSE 99 04/22/2023 11:36 AM    PROT 6.8 07/28/2022 01:32 PM    PROT 7.0 01/18/2011 11:50 AM    CALCIUM 9.3 04/22/2023 11:36 AM    BILITOT 1.3 07/28/2022 01:32 PM    ALKPHOS 130 07/28/2022 01:32 PM    AST 17 07/28/2022 01:32 PM    ALT 12 07/28/2022 01:32 PM       POC Tests: No results for input(s): POCGLU, POCNA, POCK, POCCL, POCBUN, POCHEMO, POCHCT in the last 72 hours.     Coags:   Lab Results   Component Value Date/Time    PROTIME 16.4 07/27/2022 01:14 PM    INR 1.33 07/27/2022 01:14 PM    APTT 71.3 01/30/2017 02:28 PM       HCG (If Applicable): No results found for: PREGTESTUR, PREGSERUM, HCG, HCGQUANT     ABGs: No results

## 2023-04-27 NOTE — DISCHARGE INSTRUCTIONS
Discharge Instructions - Atrial Fibrillation Ablation     You have had a procedure called catheter ablation for atrial fibrillation. It was used to treat an abnormal heartbeat (arrhythmia). This procedure destroyed (ablated) the cells in your heart that were causing your heart rhythm problem. During the procedure, the healthcare provider put a thin, flexible wire (catheter) into a blood vessel in your groin. The physician then threaded the catheter to your heart and destroyed the cells causing the problem. Puncture Site Care     You will generally have three sets of puncture sites on the right side of the groin, keep the area clean. While coughing, sneezing, or during bowel movements, support the puncture site by applying gentle compression with the palm of your hand. You may shower when you get home and remove the dressing. Do not immerse site in water. DO NOT go in a bathtub, pool, ocean, or lake until completely site is completely healed in about 7-10 days. Avoid any lotions, powders or creams to the puncture site until fully healed. You may notice a lump at the puncture site smaller than the size of a quarter. This is normal.   No spicy foods for one month. Activity     No driving for 2 days after the procedure. Someone must drive you home from the hospital.   No lifting more than 10 pounds for 3 days after the ablation. Expect to be able to go back to your normal daily activities in the next 2 days. These include walking, climbing stairs, and doing household chores. You may resume normal activity after 1 week but avoid strenuous activities for 2 weeks. Ask your healthcare provider when you can return to work. What to expect after your ablation     During the first 48 hours after an ablation, some patients may experience the following:     Mild Chest Discomfort - for a week or so, due to post procedure inflammation. The pain will often worsen with a deep breath or when leaning forward.

## 2023-04-27 NOTE — ADDENDUM NOTE
Addendum  created 04/27/23 1512 by KEELEY Bermudez CRNA    Intraprocedure Event edited, Intraprocedure Staff edited

## 2023-04-27 NOTE — PROCEDURES
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 Additional ablation of complex fractionated atrial electrograms (for atrial fibrillation) on the LA septal wall  Additional ablation of complex fractionated atrial electrograms (for atrial fibrillation) on the LA anterior wall but inferiorly  3-D electroanatomical mapping of the left atrium    Transseptal puncture through an intact septum x 1 under intracardiac ultrasound guidance without fluoroscopic guidance   Intracardiac echocardiography  Left ventricular pacing and recording  Drug infusion with an attempt to induce atrial tachydysrhythmia  Transesophageal echocardiogram  External cardioversion of the atrial arrhythmias   Anesthesia: General anesthesia provided by the Anesthesia service    Indications for procedure:     Adama Garg is a 81 y.o. female who has a history of persistent atrial fibrillation who is symptomatic with symptoms of dyspnea with minimal exertion and fatigue who has failed antiarrhythmic therapy in the past is now here for

## 2023-04-27 NOTE — H&P
Cardiac Electrophysiology Consultation   Date: 4/27/2023  Reason for Consultation: persistent atrial fibrillation, consideration of BIV ICD. Consult Requesting Physician:  Jhony Bautista MD  Primary Care Physician: Lorelei Tillman MD     Chief Complaint:        Chief Complaint   Patient presents with    Follow-up       PT concerned of chest pains after CV. HPI: Leticia Whiteside is a 81 y.o. patient with a history of systolic heart failure, atrial fibrillation, hypertension, hyperlipidemia, and TIA. She was hospitalized at [facility] 07/2022 after presenting with dizziness and vertigo. CT of head and MRI showed nothing acute. Echo showed LVEF 35 %. Of note she had Echo I 2017 which also showed decreased LVED but she did not follow up with cardiologist. She underwent Left an right heart cath on 09/22/2022 which showed normal coronaries. Repeat Echo on 01/24/2023 showed LVEF remained depressed at 25-30 % despite optimized guideline directed medical therapy. She was seen in office on 02/27/2022 for consideration of BIV ICD. EKG showed atrial fibrillation, LBBB, v-rate 104 bpm. Symptomatic with fatigue and WHITFIELD. Treatment options were discussed and decision was made to undergo CV to see if we would be successful in restoring SR given her history of longstanding persistent atrial fibrillation and if successful recommendation was to undergo ablation. On 3/17/2023 she underwent successful external DC cardioversion of symptomatic, persistent atrial fibrillation 03/17/2023. Interval History: Today, she presents to office for follow up s/p  CV. EKG today shows atrial fibrillation. She reports she had trouble breathing after the CV and was tired and fatigue. She reports she felt better the next morning. Reports compliance with medications and tolerating them well.  Denies chest pain/pressure, tightness, edema, shortness of breath, heart racing, palpitations, lightheadedness, dizziness, syncope,

## 2023-04-27 NOTE — PROGRESS NOTES
PACU Transfer to [facility]    Vitals:    04/27/23 1611   BP: 108/62   Pulse: 66   Resp: 17   Temp: 97 °F (36.1 °C)   SpO2: 98%         Intake/Output Summary (Last 24 hours) at 4/27/2023 1620  Last data filed at 4/27/2023 1445  Gross per 24 hour   Intake 1000 ml   Output --   Net 1000 ml       Pain assessment:  present - adequately treated  Pain Level: 5    Patient transferred to care of MAGALY RN.    4/27/2023 4:20 PM

## 2023-04-28 ENCOUNTER — TELEPHONE (OUTPATIENT)
Dept: CARDIOLOGY CLINIC | Age: 73
End: 2023-04-28

## 2023-04-28 LAB
EKG ATRIAL RATE: 70 BPM
EKG DIAGNOSIS: NORMAL
EKG Q-T INTERVAL: 378 MS
EKG QRS DURATION: 144 MS
EKG QTC CALCULATION (BAZETT): 469 MS
EKG R AXIS: -36 DEGREES
EKG T AXIS: 125 DEGREES
EKG VENTRICULAR RATE: 93 BPM
INR BLD: 2.6 (ref 0.84–1.16)

## 2023-04-28 RX ORDER — AMIODARONE HYDROCHLORIDE 200 MG/1
100 TABLET ORAL DAILY
Qty: 45 TABLET | Refills: 0 | Status: SHIPPED | OUTPATIENT
Start: 2023-04-28

## 2023-04-28 NOTE — TELEPHONE ENCOUNTER
Patient had ablation with Dr. Derrick Owen at [facility] 4/27.   Please call patient to schedule 3 mos f/u with Marty Storm NP

## 2023-04-28 NOTE — TELEPHONE ENCOUNTER
I called Macario Nj this morning to schedule her 3 month f/u with Sofia Keys NP, Macario Nj just had an ablation yesterday with Dr. Delfina Ocasio, she stated she is in a lot of pain, and believes she has a high fever, I asked what her fever was and she said she hasn't taken it but feels very hot. She also states it hurts to cough. Started last night.   She denies all other symptoms

## 2023-04-28 NOTE — TELEPHONE ENCOUNTER
Pt has since taken Hydrocodone with relief  We discussed post procedural concerns and scheduled 3 months post procedure appt  She will call if sx worsen or fail to improve    Also discussed medications  Confirmed out of pocket cost for Amiodarone 200 mg 0.5 daily is $0 per pharmacy.

## 2023-05-09 ENCOUNTER — OFFICE VISIT (OUTPATIENT)
Dept: CARDIOLOGY CLINIC | Age: 73
End: 2023-05-09

## 2023-05-09 VITALS
HEIGHT: 65 IN | WEIGHT: 268.4 LBS | SYSTOLIC BLOOD PRESSURE: 118 MMHG | HEART RATE: 67 BPM | OXYGEN SATURATION: 98 % | DIASTOLIC BLOOD PRESSURE: 68 MMHG | BODY MASS INDEX: 44.72 KG/M2

## 2023-05-09 DIAGNOSIS — E78.5 HYPERLIPIDEMIA LDL GOAL <100: ICD-10-CM

## 2023-05-09 DIAGNOSIS — I50.22 CHRONIC SYSTOLIC HEART FAILURE (HCC): ICD-10-CM

## 2023-05-09 DIAGNOSIS — E66.01 MORBID OBESITY (HCC): ICD-10-CM

## 2023-05-09 DIAGNOSIS — G45.9 TIA (TRANSIENT ISCHEMIC ATTACK): ICD-10-CM

## 2023-05-09 DIAGNOSIS — I10 ESSENTIAL HYPERTENSION: ICD-10-CM

## 2023-05-09 DIAGNOSIS — I42.8 NICM (NONISCHEMIC CARDIOMYOPATHY) (HCC): ICD-10-CM

## 2023-05-09 DIAGNOSIS — I48.19 PERSISTENT ATRIAL FIBRILLATION (HCC): Primary | ICD-10-CM

## 2023-05-09 NOTE — PROGRESS NOTES
personally reviewed by me in its entirety. I confirm the note above accurately reflects all work, treatment, procedures, and medical decision making performed by me.     Electronically signed by Danni Jean MD on 5/13/2023 at 8:04 AM

## 2023-06-05 PROBLEM — I48.11 LONGSTANDING PERSISTENT ATRIAL FIBRILLATION (HCC): Status: ACTIVE | Noted: 2022-07-25

## 2023-06-05 NOTE — PROGRESS NOTES
[facility]   [facility]      Date: 6/6/2023    Primary Cardiologist: Cristobal Valverde MD  PCP: Melanie Hdez MD     Chief Complaint:   Chief Complaint   Patient presents with    Follow-up     History of Present Illness:    I saw Hiram Villalta in the office for electrophysiology follow up today. She is a 81 y.o. female with a past medical history of systolic heart failure, non-ischemic cardiomyopathy, persistent atrial fibrillation, LBBB, hypertension, hyperlipidemia, and TIA. She underwent successful cardioversion of atrial fibrillation on 3/17/23 with Dr. Dana Ramirez and was started on amiodarone 200mg QD. She then underwent atrial fibrillation, left atrial flutter and atrial tachycardia ablation on 4/27/23 with Dr. Dana Ramirez. Amiodarone was decreased to 100mg QD which was stopped in May by Dr. Miroslava Fisher. She presents today for follow up. She has been feeling about the same since prior to her ablation. She does have fatigue and dyspnea at times. Brief, rare chest pain. No palpitations. No syncope. No edema. She thinks she has only been taking Entresto once a day. Allergies: Allergies   Allergen Reactions    Pcn [Penicillins]      Home Medications:  Prior to Visit Medications    Medication Sig Taking?  Authorizing Provider   nystatin (MYCOSTATIN) 819906 UNIT/GM powder  Yes Historical Provider, MD   naloxone 4 MG/0.1ML LIQD nasal spray 1 spray by Nasal route once as needed Yes Historical Provider, MD   metoprolol succinate (TOPROL XL) 50 MG extended release tablet Take 0.5 tablets by mouth daily Yes Marylene Rash, MD   empagliflozin (JARDIANCE) 10 MG tablet Take 1 tablet by mouth daily Yes Tena Otero MD   rivaroxaban (XARELTO) 20 MG TABS tablet Take 1 tablet by mouth daily Yes Tena Otero MD   sacubitril-valsartan (ENTRESTO) 24-26 MG per tablet Take 1 tablet by mouth 2 times daily Yes Tena Otero MD   HYDROcodone-acetaminophen (NORCO) 7.5-325 MG per tablet TAKE 1 TABLET BY MOUTH EVERY 6

## 2023-06-06 ENCOUNTER — OFFICE VISIT (OUTPATIENT)
Dept: CARDIOLOGY CLINIC | Age: 73
End: 2023-06-06
Payer: MEDICARE

## 2023-06-06 VITALS
WEIGHT: 272 LBS | HEART RATE: 50 BPM | HEIGHT: 65 IN | DIASTOLIC BLOOD PRESSURE: 70 MMHG | SYSTOLIC BLOOD PRESSURE: 110 MMHG | BODY MASS INDEX: 45.32 KG/M2

## 2023-06-06 DIAGNOSIS — I48.11 LONGSTANDING PERSISTENT ATRIAL FIBRILLATION (HCC): Primary | ICD-10-CM

## 2023-06-06 DIAGNOSIS — I50.22 CHRONIC SYSTOLIC HEART FAILURE (HCC): ICD-10-CM

## 2023-06-06 DIAGNOSIS — I42.8 NICM (NONISCHEMIC CARDIOMYOPATHY) (HCC): ICD-10-CM

## 2023-06-06 DIAGNOSIS — I10 ESSENTIAL HYPERTENSION: ICD-10-CM

## 2023-06-06 PROCEDURE — 93000 ELECTROCARDIOGRAM COMPLETE: CPT | Performed by: NURSE PRACTITIONER

## 2023-06-06 PROCEDURE — 3017F COLORECTAL CA SCREEN DOC REV: CPT | Performed by: NURSE PRACTITIONER

## 2023-06-06 PROCEDURE — G8427 DOCREV CUR MEDS BY ELIG CLIN: HCPCS | Performed by: NURSE PRACTITIONER

## 2023-06-06 PROCEDURE — G8417 CALC BMI ABV UP PARAM F/U: HCPCS | Performed by: NURSE PRACTITIONER

## 2023-06-06 PROCEDURE — G8400 PT W/DXA NO RESULTS DOC: HCPCS | Performed by: NURSE PRACTITIONER

## 2023-06-06 PROCEDURE — 99214 OFFICE O/P EST MOD 30 MIN: CPT | Performed by: NURSE PRACTITIONER

## 2023-06-06 PROCEDURE — 3078F DIAST BP <80 MM HG: CPT | Performed by: NURSE PRACTITIONER

## 2023-06-06 PROCEDURE — 1090F PRES/ABSN URINE INCON ASSESS: CPT | Performed by: NURSE PRACTITIONER

## 2023-06-06 PROCEDURE — 1123F ACP DISCUSS/DSCN MKR DOCD: CPT | Performed by: NURSE PRACTITIONER

## 2023-06-06 PROCEDURE — 3074F SYST BP LT 130 MM HG: CPT | Performed by: NURSE PRACTITIONER

## 2023-06-06 PROCEDURE — 1036F TOBACCO NON-USER: CPT | Performed by: NURSE PRACTITIONER

## 2023-06-06 PROCEDURE — 93242 EXT ECG>48HR<7D RECORDING: CPT | Performed by: NURSE PRACTITIONER

## 2023-06-06 ASSESSMENT — ENCOUNTER SYMPTOMS
WHEEZING: 0
NAUSEA: 0
COLOR CHANGE: 0
ABDOMINAL PAIN: 0
SHORTNESS OF BREATH: 1
BACK PAIN: 0
BLOOD IN STOOL: 0
SINUS PRESSURE: 0
DIARRHEA: 0
CONSTIPATION: 0
TROUBLE SWALLOWING: 0
COUGH: 0
SORE THROAT: 0
VOMITING: 0

## 2023-06-07 ENCOUNTER — TELEPHONE (OUTPATIENT)
Dept: CARDIOLOGY CLINIC | Age: 73
End: 2023-06-07

## 2023-06-28 ENCOUNTER — TELEPHONE (OUTPATIENT)
Dept: CARDIOLOGY CLINIC | Age: 73
End: 2023-06-28

## 2023-06-28 DIAGNOSIS — I42.8 NICM (NONISCHEMIC CARDIOMYOPATHY) (HCC): ICD-10-CM

## 2023-06-28 DIAGNOSIS — I48.11 LONGSTANDING PERSISTENT ATRIAL FIBRILLATION (HCC): Primary | ICD-10-CM

## 2023-06-28 DIAGNOSIS — I50.22 CHRONIC SYSTOLIC HEART FAILURE (HCC): ICD-10-CM

## 2023-06-28 PROCEDURE — 93244 EXT ECG>48HR<7D REV&INTERPJ: CPT | Performed by: NURSE PRACTITIONER

## 2023-06-30 ENCOUNTER — HOSPITAL ENCOUNTER (OUTPATIENT)
Dept: CARDIOLOGY | Age: 73
Discharge: HOME | End: 2023-06-30
Payer: MEDICARE

## 2023-06-30 DIAGNOSIS — I50.22 CHRONIC SYSTOLIC HEART FAILURE (HCC): ICD-10-CM

## 2023-06-30 DIAGNOSIS — I48.11 LONGSTANDING PERSISTENT ATRIAL FIBRILLATION (HCC): ICD-10-CM

## 2023-06-30 DIAGNOSIS — I42.8 NICM (NONISCHEMIC CARDIOMYOPATHY) (HCC): ICD-10-CM

## 2023-06-30 LAB
LV EF: 53 %
LVEF MODALITY: NORMAL

## 2023-06-30 PROCEDURE — 93308 TTE F-UP OR LMTD: CPT

## 2023-07-01 DIAGNOSIS — I48.11 LONGSTANDING PERSISTENT ATRIAL FIBRILLATION (HCC): ICD-10-CM

## 2023-07-01 DIAGNOSIS — R00.2 PALPITATIONS: Primary | ICD-10-CM

## 2023-07-05 ENCOUNTER — TELEPHONE (OUTPATIENT)
Dept: CARDIOLOGY CLINIC | Age: 73
End: 2023-07-05

## 2023-07-05 NOTE — TELEPHONE ENCOUNTER
Carli Lynch called in this morning, she is returning a call concerning her Cam patch results. She can be reached at [phone].

## 2023-07-06 ENCOUNTER — OFFICE VISIT (OUTPATIENT)
Dept: CARDIOLOGY CLINIC | Age: 73
End: 2023-07-06
Payer: MEDICARE

## 2023-07-06 ENCOUNTER — TELEPHONE (OUTPATIENT)
Dept: CARDIOLOGY CLINIC | Age: 73
End: 2023-07-06

## 2023-07-06 VITALS
OXYGEN SATURATION: 98 % | BODY MASS INDEX: 44.1 KG/M2 | DIASTOLIC BLOOD PRESSURE: 60 MMHG | WEIGHT: 265 LBS | SYSTOLIC BLOOD PRESSURE: 110 MMHG | HEART RATE: 34 BPM

## 2023-07-06 DIAGNOSIS — I48.0 PAF (PAROXYSMAL ATRIAL FIBRILLATION) (HCC): ICD-10-CM

## 2023-07-06 DIAGNOSIS — I50.22 CHRONIC SYSTOLIC HEART FAILURE (HCC): ICD-10-CM

## 2023-07-06 DIAGNOSIS — I42.8 NICM (NONISCHEMIC CARDIOMYOPATHY) (HCC): ICD-10-CM

## 2023-07-06 DIAGNOSIS — I48.19 PERSISTENT ATRIAL FIBRILLATION (HCC): ICD-10-CM

## 2023-07-06 DIAGNOSIS — I48.4 ATYPICAL ATRIAL FLUTTER (HCC): ICD-10-CM

## 2023-07-06 DIAGNOSIS — I48.91 ATRIAL FIBRILLATION, RAPID (HCC): Primary | ICD-10-CM

## 2023-07-06 LAB
ANION GAP SERPL CALCULATED.3IONS-SCNC: 8 MMOL/L (ref 3–16)
BUN SERPL-MCNC: 18 MG/DL (ref 7–20)
CALCIUM SERPL-MCNC: 9.7 MG/DL (ref 8.3–10.6)
CHLORIDE SERPL-SCNC: 105 MMOL/L (ref 99–110)
CO2 SERPL-SCNC: 27 MMOL/L (ref 21–32)
CREAT SERPL-MCNC: 0.7 MG/DL (ref 0.6–1.2)
GFR SERPLBLD CREATININE-BSD FMLA CKD-EPI: >60 ML/MIN/{1.73_M2}
GLUCOSE SERPL-MCNC: 90 MG/DL (ref 70–99)
NT-PROBNP SERPL-MCNC: 543 PG/ML (ref 0–124)
POTASSIUM SERPL-SCNC: 4.2 MMOL/L (ref 3.5–5.1)
SODIUM SERPL-SCNC: 140 MMOL/L (ref 136–145)

## 2023-07-06 PROCEDURE — 99215 OFFICE O/P EST HI 40 MIN: CPT | Performed by: INTERNAL MEDICINE

## 2023-07-06 PROCEDURE — 3074F SYST BP LT 130 MM HG: CPT | Performed by: INTERNAL MEDICINE

## 2023-07-06 PROCEDURE — 1123F ACP DISCUSS/DSCN MKR DOCD: CPT | Performed by: INTERNAL MEDICINE

## 2023-07-06 PROCEDURE — 93000 ELECTROCARDIOGRAM COMPLETE: CPT | Performed by: INTERNAL MEDICINE

## 2023-07-06 PROCEDURE — 3078F DIAST BP <80 MM HG: CPT | Performed by: INTERNAL MEDICINE

## 2023-07-06 RX ORDER — FUROSEMIDE 40 MG/1
40 TABLET ORAL DAILY
Qty: 4 TABLET | Refills: 0 | Status: SHIPPED | OUTPATIENT
Start: 2023-07-06

## 2023-07-06 NOTE — PATIENT INSTRUCTIONS
If you have any question regarding your procedure or if you would like to proceed with scheduling please contact Nurse Pau at [phone]. Implantation of Dual chamber pacemaker Pre procedure Instructions    Date:______________________________    Arrive at:__________________________    Procedure time:____________________    The morning of your procedure you will park in the hospital parking lot and report directly to the cath lab to check in. At the information desk stay right and go all the way to the end of the rico, this will take you directly to your check in desk for the cath lab. Pre-Procedure Instructions   You will need to fast (nothing to eat or drink) after midnight the day of your procedure  Do NOT chew gum or eat mints the day of your procedure  You will need to hold your Xarelto for  3 days prior to the procedure. Do not use any lotions, creams or perfume the morning of procedure. You will need to complete pre-procedure lab work 5-7 days prior to your procedure. Please have a responsible adult to drive you home after procedure, you should go home same day, but there is always a possibility of an overnight stay. Cath lab will provide you with all post procedure instructions                                          [facility]  [address] [place] [address]  Phone: [phone]  The hours are Mon -Fri.   6:30 am - 4:00 pm   Saturday 8:00 am - noon  No appointment necessary

## 2023-07-06 NOTE — PROGRESS NOTES
with the PPM implantation. If she decides to proceed she will contact my nurse and we will proceed with scheduling a Medtronic 2-chamber PPM implantation for symptomatic sinus bradycardia. We will hold Xarelto for three days prior. I spent 40 minutes face to face with the patient, with greater than 50% of that time spent in counseling on the above. Chronic systolic heart failure  Non ischemic cardiomyopathy  - EF 35%, cath showed normal coronaries   - Echo 01/24/2023 resulting in reduced LVEF of 25-30%. Currently, most recent echo shows LVEF 50-55%. Will obtain Cardiac MRI to evaluate true LVEF as gold standard. If not able to tolerate cardiac MRI, will obtain MUGA.    - Continue optimized guideline directed medical therapy, which patient has been on for greater than three months. Beta Blocker: Toprol XL 25 mg daily. ACE/ ARNI/ARB: Entresto 24-26 mg BID   SGLT2 Inhibitor: Jardiance 10 mg daily              Aldosterone Antagonist: Not on due to hypotension. Not currently on Diuretic. Reports symptoms of orthopnea. Start Furosemide  40 mg daily for the next four days. Will check BMP, BNP prior to and afterwards. -  Reports orthopnea which has been ongoing for the past three months. - LBBB,  msec. BIV ICD implantation was discussed during her office visit on  02/27/2023  The patient would like some time to deliberate further regarding procedure. If she wishes to proceed, he will contact our nurse, Dian Huynh at which time we will a Medtronic BiV ICD implantation for primary prevention of sudden cardiac arrest and for heart failure management. Long standing persistent atrial fibrillation  - Symptomatic with fatigue and WHITFIELD.  - Patient has a [id number] Score 5 (HTN, CHF, TIA, AGE, GENDER)  - Continue Xarelto 20 daily for thromboembolic risk reduction.  - Patient has not been taking her Xarelto with food. Instructed to take with largest meal of the day.   - Tolerating

## 2023-07-12 ENCOUNTER — OFFICE VISIT (OUTPATIENT)
Dept: CARDIOLOGY CLINIC | Age: 73
End: 2023-07-12
Payer: MEDICARE

## 2023-07-12 VITALS
HEART RATE: 41 BPM | WEIGHT: 258 LBS | OXYGEN SATURATION: 95 % | BODY MASS INDEX: 42.99 KG/M2 | SYSTOLIC BLOOD PRESSURE: 115 MMHG | HEIGHT: 65 IN | DIASTOLIC BLOOD PRESSURE: 60 MMHG

## 2023-07-12 DIAGNOSIS — I50.21 HEART FAILURE, ACUTE SYSTOLIC (HCC): ICD-10-CM

## 2023-07-12 DIAGNOSIS — I42.8 NON-ISCHEMIC CARDIOMYOPATHY (HCC): ICD-10-CM

## 2023-07-12 DIAGNOSIS — I49.5 TACHY-BRADY SYNDROME (HCC): ICD-10-CM

## 2023-07-12 DIAGNOSIS — I48.19 PERSISTENT ATRIAL FIBRILLATION (HCC): ICD-10-CM

## 2023-07-12 DIAGNOSIS — I50.21 HEART FAILURE, ACUTE SYSTOLIC (HCC): Primary | ICD-10-CM

## 2023-07-12 LAB
ALBUMIN SERPL-MCNC: 3.8 G/DL (ref 3.4–5)
ANION GAP SERPL CALCULATED.3IONS-SCNC: 10 MMOL/L (ref 3–16)
BUN SERPL-MCNC: 24 MG/DL (ref 7–20)
CALCIUM SERPL-MCNC: 9.6 MG/DL (ref 8.3–10.6)
CHLORIDE SERPL-SCNC: 105 MMOL/L (ref 99–110)
CO2 SERPL-SCNC: 25 MMOL/L (ref 21–32)
CREAT SERPL-MCNC: 0.8 MG/DL (ref 0.6–1.2)
GFR SERPLBLD CREATININE-BSD FMLA CKD-EPI: >60 ML/MIN/{1.73_M2}
GLUCOSE SERPL-MCNC: 98 MG/DL (ref 70–99)
PHOSPHATE SERPL-MCNC: 4.2 MG/DL (ref 2.5–4.9)
POTASSIUM SERPL-SCNC: 4.6 MMOL/L (ref 3.5–5.1)
SODIUM SERPL-SCNC: 140 MMOL/L (ref 136–145)

## 2023-07-12 PROCEDURE — 1123F ACP DISCUSS/DSCN MKR DOCD: CPT | Performed by: NURSE PRACTITIONER

## 2023-07-12 PROCEDURE — 99215 OFFICE O/P EST HI 40 MIN: CPT | Performed by: NURSE PRACTITIONER

## 2023-07-12 PROCEDURE — 3074F SYST BP LT 130 MM HG: CPT | Performed by: NURSE PRACTITIONER

## 2023-07-12 PROCEDURE — 3078F DIAST BP <80 MM HG: CPT | Performed by: NURSE PRACTITIONER

## 2023-07-12 RX ORDER — CARVEDILOL 3.12 MG/1
3.12 TABLET ORAL 2 TIMES DAILY
Qty: 60 TABLET | Refills: 3 | Status: SHIPPED | OUTPATIENT
Start: 2023-07-12

## 2023-07-12 RX ORDER — FUROSEMIDE 40 MG/1
40 TABLET ORAL DAILY
Qty: 30 TABLET | Refills: 3 | Status: SHIPPED | OUTPATIENT
Start: 2023-07-12

## 2023-07-12 NOTE — PROGRESS NOTES
[facility]   Cardiology Office Visit      Date: 7/12/2023  CC:    Chief Complaint   Patient presents with    Follow-up     Pain under patient's right arm        Presents today for concerns of fluid overload. Patient presents with her daughter. She typically comes to appointments with her niece. 81 y.o. female with a past medical history significant for systolic heart failure, atrial fibrillation, hypertension, hyperlipidemia, and TIA        7/2022 hospitalized with dizziness and vertigo. Echo showed LVEF 35 %. Of note she had Echo I 2017 which also showed decreased LVED but she did not follow up with cardiologist. She underwent Left an right heart cath on 09/22/2022 which showed normal coronaries. Repeat Echo on 01/24/2023 showed LVEF remained depressed at 25-30 % despite optimized guideline directed medical therapy. Patient seen by EP 2/2022 for consideration of BIV ICD. She was found to have AF with RVR and LBBB. Patient had subsequent DCCV and then EPS with RFCA 4/2023. Patient had f/u CAM patch and was found to have SB with average HR in the 40's and 648 pauses up to 4.3 seconds in duration. She was seen by EP to discuss PPM placement. EP had lengthy discussion about PPM. During this OV, she was found to be fluid overloaded and started on lasix. She reports continued episodes of fatigue, shortness of breath and WHITFIELD. Family reports her weight is down slightly. Patient states she knows what medications she currently takes however when reviewing medications patient is not familiar with the medications or what they are for. Patient also seems to have limited insight into the conversation about PPM and does not remember having AF or that she underwent ablation. She is very anxious about a PPM and reports she has not talked to her niece about the procedure-despite the niece being present for her last OV. HPI: Henry Alonso is a 81 y.o.      Past Medical History:   Diagnosis

## 2023-07-19 ENCOUNTER — PATIENT MESSAGE (OUTPATIENT)
Dept: CARDIOLOGY CLINIC | Age: 73
End: 2023-07-19

## 2023-07-31 NOTE — TELEPHONE ENCOUNTER
Dorene Duverney called to speak to ALYSSA Gregg. She provided a callback number.     Dorene Duverney: [phone]

## 2023-08-18 ENCOUNTER — HOSPITAL ENCOUNTER (OUTPATIENT)
Dept: NON INVASIVE DIAGNOSTICS | Age: 73
Discharge: HOME | End: 2023-08-18
Payer: MEDICARE

## 2023-08-18 DIAGNOSIS — I50.21 HEART FAILURE, ACUTE SYSTOLIC (HCC): ICD-10-CM

## 2023-08-18 LAB
LV EF: 58 %
LVEF MODALITY: NORMAL

## 2023-08-18 PROCEDURE — 78472 GATED HEART PLANAR SINGLE: CPT

## 2023-08-18 PROCEDURE — A9538 TC99M PYROPHOSPHATE: HCPCS | Performed by: NURSE PRACTITIONER

## 2023-08-18 PROCEDURE — 3430000000 HC RX DIAGNOSTIC RADIOPHARMACEUTICAL: Performed by: NURSE PRACTITIONER

## 2023-08-18 RX ORDER — TECHNETIUM TC99M PYROPHOSPHATE 12 MG/10ML
25 INJECTION INTRAVENOUS ONCE
Status: COMPLETED | OUTPATIENT
Start: 2023-08-18 | End: 2023-08-18

## 2023-08-18 RX ADMIN — TECHNETIUM TC99M PYROPHOSPHATE 25 MILLICURIE: 12 INJECTION INTRAVENOUS at 10:26

## 2023-08-23 ENCOUNTER — TELEPHONE (OUTPATIENT)
Dept: CARDIOLOGY CLINIC | Age: 73
End: 2023-08-23

## 2023-08-23 DIAGNOSIS — I49.5 TACHY-BRADY SYNDROME (HCC): Primary | ICD-10-CM

## 2023-08-23 NOTE — TELEPHONE ENCOUNTER
Under results - per Dr. Minerva Nix, does not need any device. Per Dr. Georgette Schmid and Radames Victor, NP - would recommend PPM. Please clarify, thank you.

## 2023-08-23 NOTE — TELEPHONE ENCOUNTER
Candy's daughter Khurram Jerez called in this morning wanting to know the results of Candy's Muga Scan?  She had it done at [facility] on 8/18    You can reach Khurram Jerez at #[phone]

## 2023-08-23 NOTE — TELEPHONE ENCOUNTER
Spoke with patients daughter Chelsea Peter advised of MUGA results and advised the she moise not need ICD but still does meet indication for PPM implant patient is agreeable to proceed. Please reach out to patient's sister Christine Hanna at [phone] and schedule dual chamber pacemaker with Dr. Henry Fry    Anesthesia is NOT needed   Include [employer] rep in email dread [email or web address]      Implantation of  Dual Chamber Pacemaker  Pre procedure Instructions    Date:______________________________    Arrive at:__________________________    Procedure time:____________________    The morning of your procedure you will park in the hospital parking lot and report directly to the cath lab to check in. At the information desk stay right and go all the way to the end of the rico, this will take you directly to your check in desk for the cath lab. Pre-Procedure Instructions   You will need to fast (nothing to eat or drink) after midnight the day of your procedure  Do NOT chew gum or eat mints the day of your procedure  You will need to hold your Xarelto for 3 days prior to the procedure. You may hold your Furosemide the morning of the procedure for comfort to decrease urinary urgency. You will need to hold your Jardiance the morning of the procedure. Do not use any lotions, creams or perfume the morning of procedure. You will need to complete pre-procedure lab work 5-7 days prior to your procedure. Please have a responsible adult to drive you home after procedure, you should go home same day, but there is always a possibility of an overnight stay. Cath lab will provide you with all post procedure instructions                                          [facility] [place] [facility]  [address] [place] [address]   [address], [address] [facility]  Phone: [phone]  The hours are Mon -Fri.   6:30 am - 4:00 pm   Saturday 8:00 am - noon  No appointment necessary

## 2023-08-23 NOTE — TELEPHONE ENCOUNTER
I called and spoke with Maximilian Zambrano I advised that we would not want he to take any anxiety medication prior to the procedure as she would be sedated for the procedure and would avoid medication prior due to risk of oversedation. Verbalized understanding.

## 2023-08-23 NOTE — TELEPHONE ENCOUNTER
Spoke with the sister Josy Laws and got the patient scheduled for procedure. She is asking if they can give her anything for Anxiety prior to her coming in for procedure. I advised I will send to RN to call her. We went over instructions below she was driving but verbalized understanding. Implantation of  Dual Chamber Pacemaker  Pre procedure Instructions     Date: 9/5/2023     Arrive at: 10:30 am   Procedure time: 12:00 pm      The morning of your procedure you will park in the hospital parking lot and report directly to the cath lab to check in. At the information desk stay right and go all the way to the end of the rico, this will take you directly to your check in desk for the cath lab. Pre-Procedure Instructions   You will need to fast (nothing to eat or drink) after midnight the day of your procedure  Do NOT chew gum or eat mints the day of your procedure  You will need to hold your Xarelto for 3 days prior to the procedure. You may hold your Furosemide the morning of the procedure for comfort to decrease urinary urgency. You will need to hold your Jardiance the morning of the procedure. Do not use any lotions, creams or perfume the morning of procedure. You will need to complete pre-procedure lab work 5-7 days prior to your procedure. Please have a responsible adult to drive you home after procedure, you should go home same day, but there is always a possibility of an overnight stay. Cath lab will provide you with all post procedure instructions                                             [facility]  [address] [place] [address]. [address] [place] [address], [address] [place] [address]  Phone: [phone]  The hours are Mon -Fri.   6:30 am - 4:00 pm   Saturday 8:00 am - noon  No appointment necessary         Qgenda updated / emailed to cath lab

## 2023-08-23 NOTE — TELEPHONE ENCOUNTER
Clarified with Dr. Baylee Zelaya, does not need ICD but if EP recommends pacer (which we do), he is okay with that.

## 2023-08-25 ENCOUNTER — TELEPHONE (OUTPATIENT)
Dept: CARDIOLOGY CLINIC | Age: 73
End: 2023-08-25

## 2023-08-25 NOTE — TELEPHONE ENCOUNTER
LVM for patients sister Ghanshyam Rivas to return call. I need to move procedure time slightly for the day of procedure.  I need to talk with her when she calls back     Pending moving of procedure time per Dr Mckenzie Willson request.     Implantation of  Dual Chamber Pacemaker  Pre procedure Instructions     Date: 9/5/2023     Arrive at: 1:00 am (new time)  Procedure time: 2:30 pm  (New time)

## 2023-08-25 NOTE — TELEPHONE ENCOUNTER
Spoke with the patient sister and advised of new procedure time. She would prefer earlier but wanted to stay on that day. She verbalized understanding.      Implantation of  Dual Chamber Pacemaker  Pre procedure Instructions     Date: 9/5/2023     Arrive at: 1:00 am (new time)  Procedure time: 2:30 pm  (New time)     Bakari updated / emailed cath lab

## 2023-08-30 DIAGNOSIS — I49.5 TACHY-BRADY SYNDROME (HCC): ICD-10-CM

## 2023-08-30 LAB
ANION GAP SERPL CALCULATED.3IONS-SCNC: 8 MMOL/L (ref 3–16)
BUN SERPL-MCNC: 17 MG/DL (ref 7–20)
CALCIUM SERPL-MCNC: 9.6 MG/DL (ref 8.3–10.6)
CHLORIDE SERPL-SCNC: 104 MMOL/L (ref 99–110)
CO2 SERPL-SCNC: 26 MMOL/L (ref 21–32)
CREAT SERPL-MCNC: 0.6 MG/DL (ref 0.6–1.2)
DEPRECATED RDW RBC AUTO: 15.5 % (ref 12.4–15.4)
GFR SERPLBLD CREATININE-BSD FMLA CKD-EPI: >60 ML/MIN/{1.73_M2}
GLUCOSE SERPL-MCNC: 104 MG/DL (ref 70–99)
HCT VFR BLD AUTO: 40.9 % (ref 36–48)
HGB BLD-MCNC: 13.8 G/DL (ref 12–16)
MCH RBC QN AUTO: 29.4 PG (ref 26–34)
MCHC RBC AUTO-ENTMCNC: 33.8 G/DL (ref 31–36)
MCV RBC AUTO: 86.9 FL (ref 80–100)
PLATELET # BLD AUTO: 235 K/UL (ref 135–450)
PMV BLD AUTO: 8.2 FL (ref 5–10.5)
POTASSIUM SERPL-SCNC: 4.3 MMOL/L (ref 3.5–5.1)
RBC # BLD AUTO: 4.71 M/UL (ref 4–5.2)
SODIUM SERPL-SCNC: 138 MMOL/L (ref 136–145)
WBC # BLD AUTO: 7.2 K/UL (ref 4–11)

## 2023-09-05 ENCOUNTER — HOSPITAL ENCOUNTER (OUTPATIENT)
Dept: GENERAL RADIOLOGY | Age: 73
Discharge: HOME | End: 2023-09-05
Payer: MEDICARE

## 2023-09-05 ENCOUNTER — HOSPITAL ENCOUNTER (OUTPATIENT)
Dept: CARDIAC CATH/INVASIVE PROCEDURES | Age: 73
Discharge: HOME | End: 2023-09-05
Payer: MEDICARE

## 2023-09-05 VITALS
TEMPERATURE: 97.3 F | WEIGHT: 272 LBS | DIASTOLIC BLOOD PRESSURE: 79 MMHG | BODY MASS INDEX: 45.32 KG/M2 | OXYGEN SATURATION: 95 % | SYSTOLIC BLOOD PRESSURE: 194 MMHG | HEART RATE: 55 BPM | RESPIRATION RATE: 16 BRPM | HEIGHT: 65 IN

## 2023-09-05 DIAGNOSIS — I50.22 CHRONIC SYSTOLIC HEART FAILURE (HCC): ICD-10-CM

## 2023-09-05 DIAGNOSIS — I48.4 ATYPICAL ATRIAL FLUTTER (HCC): ICD-10-CM

## 2023-09-05 DIAGNOSIS — I42.8 NICM (NONISCHEMIC CARDIOMYOPATHY) (HCC): Primary | ICD-10-CM

## 2023-09-05 DIAGNOSIS — I48.19 PERSISTENT ATRIAL FIBRILLATION (HCC): ICD-10-CM

## 2023-09-05 DIAGNOSIS — I48.0 PAF (PAROXYSMAL ATRIAL FIBRILLATION) (HCC): ICD-10-CM

## 2023-09-05 DIAGNOSIS — I49.5 TACHY-BRADY SYNDROME (HCC): ICD-10-CM

## 2023-09-05 DIAGNOSIS — I42.8 NON-ISCHEMIC CARDIOMYOPATHY (HCC): ICD-10-CM

## 2023-09-05 PROCEDURE — C1887 CATHETER, GUIDING: HCPCS

## 2023-09-05 PROCEDURE — C1892 INTRO/SHEATH,FIXED,PEEL-AWAY: HCPCS

## 2023-09-05 PROCEDURE — 2580000003 HC RX 258

## 2023-09-05 PROCEDURE — 99152 MOD SED SAME PHYS/QHP 5/>YRS: CPT | Performed by: INTERNAL MEDICINE

## 2023-09-05 PROCEDURE — 33208 INSRT HEART PM ATRIAL & VENT: CPT

## 2023-09-05 PROCEDURE — 99153 MOD SED SAME PHYS/QHP EA: CPT

## 2023-09-05 PROCEDURE — C1898 LEAD, PMKR, OTHER THAN TRANS: HCPCS

## 2023-09-05 PROCEDURE — 93005 ELECTROCARDIOGRAM TRACING: CPT | Performed by: INTERNAL MEDICINE

## 2023-09-05 PROCEDURE — 33208 INSRT HEART PM ATRIAL & VENT: CPT | Performed by: INTERNAL MEDICINE

## 2023-09-05 PROCEDURE — 99152 MOD SED SAME PHYS/QHP 5/>YRS: CPT

## 2023-09-05 PROCEDURE — 6360000002 HC RX W HCPCS

## 2023-09-05 PROCEDURE — 2709999900 HC NON-CHARGEABLE SUPPLY

## 2023-09-05 PROCEDURE — 71046 X-RAY EXAM CHEST 2 VIEWS: CPT

## 2023-09-05 PROCEDURE — 2500000003 HC RX 250 WO HCPCS

## 2023-09-05 PROCEDURE — C1785 PMKR, DUAL, RATE-RESP: HCPCS

## 2023-09-05 RX ORDER — SODIUM CHLORIDE 0.9 % (FLUSH) 0.9 %
5-40 SYRINGE (ML) INJECTION EVERY 12 HOURS SCHEDULED
Status: DISCONTINUED | OUTPATIENT
Start: 2023-09-05 | End: 2023-09-06 | Stop reason: HOSPADM

## 2023-09-05 RX ORDER — SODIUM CHLORIDE 0.9 % (FLUSH) 0.9 %
5-40 SYRINGE (ML) INJECTION PRN
Status: DISCONTINUED | OUTPATIENT
Start: 2023-09-05 | End: 2023-09-06 | Stop reason: HOSPADM

## 2023-09-05 RX ORDER — SODIUM CHLORIDE 9 MG/ML
INJECTION, SOLUTION INTRAVENOUS PRN
Status: DISCONTINUED | OUTPATIENT
Start: 2023-09-05 | End: 2023-09-06 | Stop reason: HOSPADM

## 2023-09-05 NOTE — DISCHARGE INSTRUCTIONS
ICD/PACEMAKER PLACEMENT DISCHARGE INSTRUCTIONS        No Driving for 2 days. We strongly recommend that a responsible adult stay with you for the next 24 hours. Do not make important / legal decisions within 24 hours post procedure. Do not lift more than 25 pounds for 4 weeks. Do not lift affected arm above head for 4 weeks. Leave dressing in place for one week. Do not get the incision wet for one week. Remove pressure dressing 24 hours after procedure. 09/06/2023  It is normal for site to be sore, bruised,  and/ or have a small amount of drainage around the incision site.     Please contact the office if you notice any signs of infection: [phone]  Redness / swelling at the incision site  Fever  Yellow drainage at the site  Pain

## 2023-09-05 NOTE — PROCEDURES
pattern on surface EKG with a QRS duration of 141 ms (native QRS was 126ms). We then used a slitting device provided by [employer] to slit and remove the [id number] guide sheath. The 7F short sheath was split and removed. The lead was secured to the underlying tissue using suture material to affix the lead collar. A new sheath was advanced over a second previously placed wire into the vein. The atrial lead was advanced to the right atrial appendage and actively fixated under fluoroscopic guidance. After confirming appropriate function and no diaphragmatic stimulation at maximum output, the sheath was split and removed. The lead was secured to the underlying tissue using suture. The leads were then connected to the new pulse generator which was then placed into the pocket. Copious amount (> 200 cc) of saline flush was used to irrigate the pocket. The pocket was then closed using a 2-0 Vicryl subcutaneous layer and a subcuticular layer using 4-0 Vicryl. The skin was covered with Steri-Strips and pressure dressing. All sponge and needle counts were reported as correct at the end of the procedure. Specimen collected: none    Estimated blood loss: < 20 cc    The patient tolerated the procedure well and there were no complications. Patient was transported to the recovery area in stable condition. Device and Leads information:            Impression:    Pre- and post-procedural diagnoses of non-reversible bradycardia secondary to sinus bradycardia 37 bpm with symptoms of fatigue and dyspnea with exertion with successful implantation of a [employer] 2-chamber PPM.     Plan:     The patient will be observed and receive the usual post-implant care, including chest x-ray, intravenous antibiotic therapy, and interrogation of the device.  From an EP perspective, if the interrogation and CXR are showing appropriate functioning, parameters and placement, the patient may be discharged from the hospital today with a

## 2023-09-05 NOTE — PRE SEDATION
Sedation Pre-Procedure Note    Patient Name: Dean Woodard   YOB: 1950  Room/Bed: Room/bed info not found  Medical Record Number: [id number]  Date: 2023   Time: 8:58 AM       Indication:  symptomatic sinus bradycardia    Consent: I have discussed with the patient and/or the patient representative the indication, alternatives, and the possible risks and/or complications of the planned procedure and the anesthesia methods. The patient and/or patient representative appear to understand and agree to proceed. Vital Signs: There were no vitals filed for this visit. Past Medical History:   has a past medical history of Arthritis, Atrial fibrillation ([address]), CHF (congestive heart failure) ([address]), Depression, Hyperlipidemia, and Hypertension. Past Surgical History:   has a past surgical history that includes  section; Cardiac catheterization (); and hernia repair. Medications:   Scheduled Meds:   Continuous Infusions:   PRN Meds:   Home Meds:   Prior to Admission medications    Medication Sig Start Date End Date Taking?  Authorizing Provider   carvedilol (COREG) 3.125 MG tablet Take 1 tablet by mouth 2 times daily 23   KEELEY Pacheco CNP   furosemide (LASIX) 40 MG tablet Take 1 tablet by mouth daily 23   KEELEY Pacheco CNP   nystatin (MYCOSTATIN) 233017 UNIT/GM powder  22   Historical Provider, MD   empagliflozin (JARDIANCE) 10 MG tablet Take 1 tablet by mouth daily 23   Alfonso Knight MD   rivaroxaban Sky Bentley) 20 MG TABS tablet Take 1 tablet by mouth daily 10/24/22   Alfonso Knight MD   sacubitril-valsartan (ENTRESTO) 24-26 MG per tablet Take 1 tablet by mouth 2 times daily 10/24/22   Alfonso Knight MD   HYDROcodone-acetaminophen ([address]) 7.5-325 MG per tablet TAKE 1 TABLET BY MOUTH EVERY 6 HOURS AS NEEDED 22   Historical Provider, MD   diclofenac sodium (VOLTAREN) 1 % GEL Apply topically 4 times daily as needed for Pain    Historical

## 2023-09-05 NOTE — H&P
(HTN, CHF, TIA, AGE, GENDER)  - Continue Xarelto 20 daily for thromboembolic risk reduction.  - Patient has not been taking her Xarelto with food. Instructed to take with largest meal of the day. - Tolerating well no signs or symptoms of abnormal bruising or bleeding.  - Continue rate control strategy currently with beta-blocker. - Successful external DC cardioversion of symptomatic, persistent atrial fibrillation 03/17/2023.  - s/p 04/27/2023 radiofrequency ablation of atrial fibrillation left atrial flutter, atrial tachycardia and pulmonary vein isolation.      - Much time was spent counseling the patient on healthier lifestyle, following a low sodium diet <2,000 mg (HF) of sodium a day and dramatically decreasing the intake of processed sugar < 24 grams for female. - Patient educated to eat a diet which includes organic fruits, vegetables and meats.     - Encouraged to watch \"That Sugar Film\" which can be found on [employer] and other [employer] services, which discusses the negative impact of processed sugar has on the body. Essential hypertension  -controlled  -continue medical therapy     Hyperlipidemia  -Continue statin therapy, Zocor 20 mg daily. Tolerating well with no reported myalgias.   - LDL 67 (07/23/2022)     TIA   -continue statin therapy. Follow ups:  - Follow up three months after ablation procedure with myself if decides to proceed otherwise follow up PRN. -Follow up 1 week post PPM  implantation in the device clinic for site check and device interrogation and 3 months after BIV ICD implantation with KEELEY Valdovinos - CNP. -Continue routine follow up with Myranda Rebollar MD.     Thank you for allowing me to participate in the care of [place]. All questions and concerns were addressed to the patient/family. Alternatives to my treatment were discussed. I have reviewed the history and physical and examined the patient and find no relevant changes.  I have reviewed

## 2023-09-06 LAB
EKG ATRIAL RATE: 37 BPM
EKG DIAGNOSIS: NORMAL
EKG Q-T INTERVAL: 484 MS
EKG QRS DURATION: 158 MS
EKG QTC CALCULATION (BAZETT): 484 MS
EKG R AXIS: -29 DEGREES
EKG T AXIS: 65 DEGREES
EKG VENTRICULAR RATE: 60 BPM

## 2023-09-06 PROCEDURE — 93010 ELECTROCARDIOGRAM REPORT: CPT | Performed by: INTERNAL MEDICINE

## 2023-09-12 ENCOUNTER — TELEPHONE (OUTPATIENT)
Dept: CARDIOLOGY CLINIC | Age: 73
End: 2023-09-12

## 2023-09-12 ENCOUNTER — NURSE ONLY (OUTPATIENT)
Dept: CARDIOLOGY CLINIC | Age: 73
End: 2023-09-12

## 2023-09-12 DIAGNOSIS — I49.5 TACHY-BRADY SYNDROME (HCC): ICD-10-CM

## 2023-09-12 DIAGNOSIS — I48.91 ATRIAL FIBRILLATION, RAPID (HCC): ICD-10-CM

## 2023-09-12 DIAGNOSIS — Z95.0 CARDIAC PACEMAKER IN SITU: Primary | ICD-10-CM

## 2023-09-12 DIAGNOSIS — I42.8 NON-ISCHEMIC CARDIOMYOPATHY (HCC): ICD-10-CM

## 2023-09-12 NOTE — TELEPHONE ENCOUNTER
Adeline Key is scheduled for KULDEEP Johnston on 12/8, Friday. She stated that she will need to reschedule for a Wednesday or Thursday appointment. KULDEEP Shepherd currently did not have anything available.

## 2023-09-14 PROCEDURE — 93294 REM INTERROG EVL PM/LDLS PM: CPT | Performed by: INTERNAL MEDICINE

## 2023-09-14 PROCEDURE — 93296 REM INTERROG EVL PM/IDS: CPT | Performed by: INTERNAL MEDICINE

## 2023-11-08 NOTE — PROGRESS NOTES
Cardiology Note    Pily Muhammad  1950    PCP: Caroline Bird MD  Reason for Referral: PAF, CHF   Chief Complaint: \"I'm doing good\"  Chief Complaint   Patient presents with    6 Month Follow-Up     6 month follow up      Subjective:     History of Present Illness: The patient is 72 y.o. female with a past medical history significant for systolic heart failure, atrial fibrillation, hypertension, hyperlipidemia, and TIA. Patient with previously noted reduced EF. She underwent angiography that showed normal coronaries 2022. Her most recent echo showed an EF 50-55%. She was referred to Dr. Mckenzie Willson for ICD, placement 2023. She underwent a cardioversion 3/17/23 and then an Afib ablation 23. Patient presents today for follow up with her sister. Reports she is doing good. Reports she is tired all the time, but this is not new for her. Patient currently denies any weight gain, edema, palpitations, chest pain, shortness of breath, dizziness, and syncope. Patient uses a walker to aid in ambulation. Patient reports a recent fall at home, denies bleeding, did not seek further care. Reports this is the first time she's fallen.            Past Medical History:   Diagnosis Date    Arthritis     Atrial fibrillation (HCC)     CHF (congestive heart failure) ([address])     Depression     Hyperlipidemia     Hypertension      Past Surgical History:   Procedure Laterality Date    CARDIAC CATHETERIZATION       SECTION      HERNIA REPAIR       Family History   Problem Relation Age of Onset    Diabetes Mother     High Cholesterol Father     High Blood Pressure Father     Diabetes Maternal Uncle     Diabetes Maternal Grandmother      Social History     Tobacco Use    Smoking status: Never    Smokeless tobacco: Never   Substance Use Topics    Alcohol use: No    Drug use: No     Allergies   Allergen Reactions    Pcn [Penicillins]      Current Outpatient Medications   Medication Sig Dispense

## 2023-11-20 ENCOUNTER — OFFICE VISIT (OUTPATIENT)
Dept: CARDIOLOGY CLINIC | Age: 73
End: 2023-11-20

## 2023-11-20 VITALS
OXYGEN SATURATION: 97 % | DIASTOLIC BLOOD PRESSURE: 60 MMHG | WEIGHT: 269 LBS | HEIGHT: 65 IN | SYSTOLIC BLOOD PRESSURE: 114 MMHG | BODY MASS INDEX: 44.82 KG/M2 | HEART RATE: 77 BPM

## 2023-11-20 DIAGNOSIS — I42.8 NON-ISCHEMIC CARDIOMYOPATHY (HCC): Primary | ICD-10-CM

## 2023-11-30 ENCOUNTER — TELEPHONE (OUTPATIENT)
Dept: CARDIOLOGY CLINIC | Age: 73
End: 2023-11-30

## 2023-11-30 NOTE — TELEPHONE ENCOUNTER
MD Jasvir Geiger, RN  Dr. Courtney Bean Co patient has AT/AF episodes on device check lasting upto 2H 39 mins. 11/16 and 11/15. Previous episodes in September Longer.

## 2023-12-14 PROCEDURE — 93296 REM INTERROG EVL PM/IDS: CPT | Performed by: INTERNAL MEDICINE

## 2023-12-14 PROCEDURE — 93294 REM INTERROG EVL PM/LDLS PM: CPT | Performed by: INTERNAL MEDICINE

## 2024-02-21 ENCOUNTER — APPOINTMENT (OUTPATIENT)
Dept: GENERAL RADIOLOGY | Age: 74
End: 2024-02-21
Attending: EMERGENCY MEDICINE
Payer: COMMERCIAL

## 2024-02-21 ENCOUNTER — HOSPITAL ENCOUNTER (EMERGENCY)
Age: 74
Discharge: HOME | End: 2024-02-21
Attending: EMERGENCY MEDICINE
Payer: COMMERCIAL

## 2024-02-21 VITALS
BODY MASS INDEX: 43.54 KG/M2 | OXYGEN SATURATION: 99 % | TEMPERATURE: 98.2 F | DIASTOLIC BLOOD PRESSURE: 97 MMHG | RESPIRATION RATE: 17 BRPM | SYSTOLIC BLOOD PRESSURE: 168 MMHG | HEIGHT: 66 IN | HEART RATE: 96 BPM | WEIGHT: 270.95 LBS

## 2024-02-21 DIAGNOSIS — S93.601A SPRAIN OF RIGHT FOOT, INITIAL ENCOUNTER: Primary | ICD-10-CM

## 2024-02-21 PROCEDURE — 99283 EMERGENCY DEPT VISIT LOW MDM: CPT

## 2024-02-21 PROCEDURE — 73630 X-RAY EXAM OF FOOT: CPT

## 2024-02-21 ASSESSMENT — PAIN DESCRIPTION - DESCRIPTORS: DESCRIPTORS: DISCOMFORT

## 2024-02-21 ASSESSMENT — PAIN SCALES - GENERAL
PAINLEVEL_OUTOF10: 5
PAINLEVEL_OUTOF10: 5

## 2024-02-21 ASSESSMENT — PAIN DESCRIPTION - LOCATION: LOCATION: FOOT

## 2024-02-21 ASSESSMENT — LIFESTYLE VARIABLES
HOW MANY STANDARD DRINKS CONTAINING ALCOHOL DO YOU HAVE ON A TYPICAL DAY: PATIENT DOES NOT DRINK
HOW OFTEN DO YOU HAVE A DRINK CONTAINING ALCOHOL: NEVER

## 2024-02-21 ASSESSMENT — PAIN DESCRIPTION - ORIENTATION: ORIENTATION: RIGHT

## 2024-02-21 ASSESSMENT — PAIN - FUNCTIONAL ASSESSMENT: PAIN_FUNCTIONAL_ASSESSMENT: 0-10

## 2024-02-21 NOTE — ED TRIAGE NOTES
Pt presents to ED with walker with c/o right foot pain x3 weeks. Reports injury while taking in groceries and then pain started. Reports took tylenol and had some relief. Reports standing and walking exacerbates pain. Resp even and unlabored. A/ox4. No acute distress noted. Denies any need at this time. Call light within reach. Bed in lowest position. Friend at bedside.

## 2024-02-21 NOTE — DISCHARGE INSTRUCTIONS
Ice.  Elevation.  Follow-up with primary care if not complete better in 1-2 more weeks.  Sooner if worse or problems.  Ibuprofen as needed for pain

## 2024-02-21 NOTE — ED PROVIDER NOTES
[facility]  EMERGENCY DEPARTMENT ENCOUNTER      Pt Name: Candy Carrasco  MRN: [id number]  Birthdate 1950  Date of evaluation: 2/21/2024  Provider: ZAKIYA VELIZ MD    CHIEF COMPLAINT       Chief Complaint   Patient presents with    Foot Pain     R foot pain x3wks, per pt she was taking in groceries 3 weeks ago and injured foot and now having pain.          HISTORY OF PRESENT ILLNESS   (Location/Symptom, Timing/Onset, Context/Setting, Quality, Duration, Modifying Factors, Severity)  Note limiting factors.   Candy Carrasco is a 73 y.o. female with   Past Medical History:   Diagnosis Date    Arthritis     Atrial fibrillation (HCC)     CHF (congestive heart failure) (HCC)     Depression     Hyperlipidemia     Hypertension     who presents to the emergency department with the chief complaint of   Chief Complaint   Patient presents with    Foot Pain     R foot pain x3wks, per pt she was taking in groceries 3 weeks ago and injured foot and now having pain.    . Patient comes in complaining her right foot pain.  She patient states she injured it 3 weeks ago when she bent it backwards up underneath her foot.  Patient stated was actually the big toe that got bent back but now she hurts on the lateral aspect of the foot.  No other complaints or problems.  No fevers or chills.        Nursing Notes were reviewed.    REVIEW OF SYSTEMS    (2-9 systems for level 4, 10 or more for level 5)     Review of Systems   Constitutional:  Negative for chills and fever.   Musculoskeletal:         Patient has right foot pain       Except as noted above the remainder of the review of systems was reviewed and negative.       PAST MEDICAL HISTORY     Past Medical History:   Diagnosis Date    Arthritis     Atrial fibrillation (HCC)     CHF (congestive heart failure) (HCC)     Depression     Hyperlipidemia     Hypertension          SURGICAL HISTORY       Past Surgical History:   Procedure Laterality

## 2024-02-21 NOTE — ED NOTES
Pt discharged from ED to home. Family at bedside. Pt verbalizes understanding to discharge instructions, teach back successful. Pt denies questions at this time. No acute distress noted. Resp even and unlabored. A/ox4. Pt instructed to follow-up as noted - return to ED if symptoms worsen. Pt verbalizes understanding. Discharged per ED MD with discharge instructions.

## 2024-03-06 ENCOUNTER — TELEPHONE (OUTPATIENT)
Dept: CARDIOLOGY CLINIC | Age: 74
End: 2024-03-06

## 2024-03-06 NOTE — TELEPHONE ENCOUNTER
Brien Fierro - 9:01 PM  Sent by Brien Fierro at 9:01 PM on Mar 5, 2024:ANDREW STORM, has been in afib since 12/2023. Needs f/u with EP.     Pt on Xarelto 20mg QD. Hx of AF. Last EP OV 7/6/23. On 3/17/2023 she underwent successful external DC cardioversion of symptomatic, persistent atrial fibrillation 03/17/2023. She decided to proceed and on 04/27/2023 she underwent radiofrequency ablation of atrial fibrillation left atrial flutter, atrial tachycardia and pulmonary vein isolation. She was seen in office on 06/06/2023 by KEELEY Ferrer CNP. EKG showed SB. Appears to be in 100% AF since January 2024.    Should pt schedule next available with Dr Fierro?

## 2024-03-07 NOTE — TELEPHONE ENCOUNTER
Can you call the patient and see if she can come into office to see Dr. Fierro on 04/24/2024 at 230 pm.   I put her on schedule.

## 2024-03-11 NOTE — TELEPHONE ENCOUNTER
Called and spoke to pt, she stated she would prefer an appointment before noon due to issues with finding a ride.

## 2024-03-13 NOTE — TELEPHONE ENCOUNTER
Candy confirms she will keep the appt for 4/24 and will let her niece know. Cancelled appt for 5/29.

## 2024-03-13 NOTE — TELEPHONE ENCOUNTER
We do not currently have any appointments available to accommodate her time request  If she wishes to push her appointment a month we can see her on 05/29/2024 at 10 am.  I went ahead and put her on for that date but also left her 04/24 appointment.  Please reach out to patient and discuss and cancel which ever appointment she does not want.

## 2024-04-24 ENCOUNTER — OFFICE VISIT (OUTPATIENT)
Dept: CARDIOLOGY CLINIC | Age: 74
End: 2024-04-24
Payer: COMMERCIAL

## 2024-04-24 ENCOUNTER — NURSE ONLY (OUTPATIENT)
Dept: CARDIOLOGY CLINIC | Age: 74
End: 2024-04-24
Payer: COMMERCIAL

## 2024-04-24 VITALS
SYSTOLIC BLOOD PRESSURE: 128 MMHG | HEART RATE: 73 BPM | WEIGHT: 276 LBS | BODY MASS INDEX: 44.55 KG/M2 | OXYGEN SATURATION: 99 % | DIASTOLIC BLOOD PRESSURE: 84 MMHG

## 2024-04-24 DIAGNOSIS — I42.8 NON-ISCHEMIC CARDIOMYOPATHY (HCC): ICD-10-CM

## 2024-04-24 DIAGNOSIS — I48.91 ATRIAL FIBRILLATION, RAPID (HCC): ICD-10-CM

## 2024-04-24 DIAGNOSIS — I48.0 PAF (PAROXYSMAL ATRIAL FIBRILLATION) (HCC): ICD-10-CM

## 2024-04-24 DIAGNOSIS — I50.22 CHRONIC SYSTOLIC HEART FAILURE (HCC): ICD-10-CM

## 2024-04-24 DIAGNOSIS — E78.2 MIXED HYPERLIPIDEMIA: ICD-10-CM

## 2024-04-24 DIAGNOSIS — I10 ESSENTIAL HYPERTENSION: ICD-10-CM

## 2024-04-24 DIAGNOSIS — I49.5 TACHY-BRADY SYNDROME (HCC): ICD-10-CM

## 2024-04-24 DIAGNOSIS — Z95.0 CARDIAC PACEMAKER IN SITU: Primary | ICD-10-CM

## 2024-04-24 DIAGNOSIS — Z95.0 CARDIAC PACEMAKER IN SITU: ICD-10-CM

## 2024-04-24 DIAGNOSIS — I48.11 LONGSTANDING PERSISTENT ATRIAL FIBRILLATION (HCC): Primary | ICD-10-CM

## 2024-04-24 DIAGNOSIS — I49.5 SICK SINUS SYNDROME (HCC): ICD-10-CM

## 2024-04-24 PROCEDURE — 99215 OFFICE O/P EST HI 40 MIN: CPT | Performed by: INTERNAL MEDICINE

## 2024-04-24 PROCEDURE — 1123F ACP DISCUSS/DSCN MKR DOCD: CPT | Performed by: INTERNAL MEDICINE

## 2024-04-24 PROCEDURE — 3079F DIAST BP 80-89 MM HG: CPT | Performed by: INTERNAL MEDICINE

## 2024-04-24 PROCEDURE — 3074F SYST BP LT 130 MM HG: CPT | Performed by: INTERNAL MEDICINE

## 2024-04-24 PROCEDURE — 93000 ELECTROCARDIOGRAM COMPLETE: CPT | Performed by: INTERNAL MEDICINE

## 2024-04-24 NOTE — PROGRESS NOTES
trends.  Estimated battery longevity ***.  AP *** %   *** %    Chronic systolic heart failure  Non ischemic cardiomyopathy  - EF 35%, cath showed normal coronaries   - Echo 01/24/2023 LVEF of 25-30% improved to LVEF 50-55%.   - MUGA 08/18/2023:  Normal resting left ventricular function with ejection fraction of 58%.   (Normal values = >50%.)  - Continue optimized guideline directed medical therapy, which patient has been on for greater than three months.              Beta Blocker: Coreg 3.125 mg twice daily              ACE/ ARNI/ARB: Entresto 24-26 mg BID              SGLT2 Inhibitor: Jardiance 10 mg daily              Diuretic: Lasix 40 mg daily     -  Reports orthopnea which has been ongoing for the past three months.  - LBBB,  msec.***  - S/p Successful implantation of a [employer] 2-chamber PPM on 09/05/2023 with Dr. Ortiz     Long standing persistent atrial fibrillation  - Symptomatic with fatigue and WHITFIELD.  - Patient has a [id number] Score 5 (HTN, CHF, TIA, AGE, GENDER)  - Continue Xarelto 20 daily for thromboembolic risk reduction.  - Patient has not been taking her Xarelto with food. Instructed to take with largest meal of the day.***  - Tolerating well no signs or symptoms of abnormal bruising or bleeding.  - Continue rate control strategy currently with beta-blocker.  - Successful external DC cardioversion of symptomatic, persistent atrial fibrillation 03/17/2023.  - s/p 04/27/2023 radiofrequency ablation of atrial fibrillation left atrial flutter, atrial tachycardia and pulmonary vein isolation.      Essential hypertension  - Controlled  - Continue medical therapy     Hyperlipidemia  -Continue statin therapy, Zocor 20 mg daily. Tolerating well with no reported myalgias.   - LDL 67 (07/23/2022)     Hx TIA   -continue statin therapy with Zocor 20 mg daily.    Obesity  There is no height or weight on file to calculate BMI.  -Excessive weight is complicating assessment and treatment. It is placing 
However, the patient is less inclined to do any procedures whatsoever.  As such would continue with medications for heart failure.    Chronic systolic heart failure  Non ischemic cardiomyopathy  - EF 35%, cath showed normal coronaries   - Echo 01/24/2023 LVEF of 25-30% improved to LVEF 50-55%.   - MUGA 08/18/2023:  Normal resting left ventricular function with ejection fraction of 58%.   (Normal values = >50%.)  - Continue optimized guideline directed medical therapy, which patient has been on for greater than three months.              Beta Blocker: Coreg 3.125 mg twice daily              ACE/ ARNI/ARB: Entresto 24-26 mg BID              SGLT2 Inhibitor: Jardiance 10 mg daily              Diuretic: Lasix 40 mg daily  - LBBB,  msec.  - Euvolemic on today's exam.  See discussion above    Symptomatic sinus bradycardia - sick sinus syndrome  - 14 day CAM patch monitor which was worn from 06/08-06/18/2023 showed average HR in the 47 bpm.  Currently atrial pacing 45% of the time as well as ventricular pacing 45%  - Symptomatic with fatigue and dyspnea with exertion  - S/p Successful implantation of a [employer] 2-chamber PPM on 09/05/2023 with Dr. Ortiz    Cardiac pacemaker in situ   - Device interrogated today and based on threshold, impedance, and intrinsic sensing tests run today, device appears to functioning with minimal deviation from established trends.  Estimated battery longevity 9.8.  AP 44.6 %   44.6 %         Essential hypertension  - Controlled.  - Continue medical therapy.      Hyperlipidemia  -Continue statin therapy, Zocor 20 mg daily. Tolerating well with no reported myalgias.   - LDL 67 (07/23/2022)     Hx TIA   -continue statin therapy.    Obesity  Body mass index is 44.55 kg/m².  -Excessive weight is complicating assessment and treatment. It is placing patient at risk for multiple co-morbidities as well as early death and contributing to the patient's presentation.  -Discussed weight management

## 2024-04-26 PROCEDURE — 93280 PM DEVICE PROGR EVAL DUAL: CPT | Performed by: INTERNAL MEDICINE

## 2024-05-21 ENCOUNTER — HOSPITAL ENCOUNTER (OUTPATIENT)
Dept: CARDIOLOGY | Age: 74
Discharge: HOME | End: 2024-05-23
Payer: COMMERCIAL

## 2024-05-21 VITALS
WEIGHT: 276 LBS | BODY MASS INDEX: 44.36 KG/M2 | DIASTOLIC BLOOD PRESSURE: 84 MMHG | SYSTOLIC BLOOD PRESSURE: 128 MMHG | HEIGHT: 66 IN

## 2024-05-21 DIAGNOSIS — I42.8 NON-ISCHEMIC CARDIOMYOPATHY (HCC): ICD-10-CM

## 2024-05-21 DIAGNOSIS — I48.11 LONGSTANDING PERSISTENT ATRIAL FIBRILLATION (HCC): ICD-10-CM

## 2024-05-21 DIAGNOSIS — I50.22 CHRONIC SYSTOLIC HEART FAILURE (HCC): ICD-10-CM

## 2024-05-21 LAB
ECHO AO ASC DIAM: 4 CM
ECHO AO ASCENDING AORTA INDEX: 1.75 CM/M2
ECHO AV AREA PEAK VELOCITY: 2 CM2
ECHO AV AREA VTI: 1.7 CM2
ECHO AV AREA/BSA PEAK VELOCITY: 0.9 CM2/M2
ECHO AV AREA/BSA VTI: 0.7 CM2/M2
ECHO AV MEAN GRADIENT: 4 MMHG
ECHO AV MEAN VELOCITY: 1 M/S
ECHO AV PEAK GRADIENT: 9 MMHG
ECHO AV PEAK VELOCITY: 1.5 M/S
ECHO AV VELOCITY RATIO: 0.6
ECHO AV VTI: 27 CM
ECHO BSA: 2.41 M2
ECHO EST RA PRESSURE: 3 MMHG
ECHO LA AREA 4C: 30.3 CM2
ECHO LA DIAMETER INDEX: 2.18 CM/M2
ECHO LA DIAMETER: 5 CM
ECHO LA MAJOR AXIS: 7.9 CM
ECHO LA VOL MOD A4C: 99 ML (ref 22–52)
ECHO LA VOLUME INDEX MOD A4C: 43 ML/M2 (ref 16–34)
ECHO LV FRACTIONAL SHORTENING: 25 % (ref 28–44)
ECHO LV INTERNAL DIMENSION DIASTOLE INDEX: 2.4 CM/M2
ECHO LV INTERNAL DIMENSION DIASTOLIC: 5.5 CM (ref 3.9–5.3)
ECHO LV INTERNAL DIMENSION SYSTOLIC INDEX: 1.79 CM/M2
ECHO LV INTERNAL DIMENSION SYSTOLIC: 4.1 CM
ECHO LV IVSD: 1.3 CM (ref 0.6–0.9)
ECHO LV MASS 2D: 304.3 G (ref 67–162)
ECHO LV MASS INDEX 2D: 132.9 G/M2 (ref 43–95)
ECHO LV POSTERIOR WALL DIASTOLIC: 1.3 CM (ref 0.6–0.9)
ECHO LV RELATIVE WALL THICKNESS RATIO: 0.47
ECHO LVOT AREA: 3.5 CM2
ECHO LVOT AV VTI INDEX: 0.5
ECHO LVOT DIAM: 2.1 CM
ECHO LVOT MEAN GRADIENT: 2 MMHG
ECHO LVOT PEAK GRADIENT: 3 MMHG
ECHO LVOT PEAK VELOCITY: 0.9 M/S
ECHO LVOT STROKE VOLUME INDEX: 20.3 ML/M2
ECHO LVOT SV: 46.4 ML
ECHO LVOT VTI: 13.4 CM
ECHO RA AREA 4C: 29.7 CM2
ECHO RA END SYSTOLIC VOLUME APICAL 4 CHAMBER INDEX BSA: 43 ML/M2
ECHO RA VOLUME: 98 ML
ECHO RIGHT VENTRICULAR SYSTOLIC PRESSURE (RVSP): 32 MMHG
ECHO RV BASAL DIMENSION: 3.9 CM
ECHO RV LONGITUDINAL DIMENSION: 7 CM
ECHO RV MID DIMENSION: 3.2 CM
ECHO TV REGURGITANT MAX VELOCITY: 2.71 M/S
ECHO TV REGURGITANT PEAK GRADIENT: 29 MMHG

## 2024-05-21 PROCEDURE — 93306 TTE W/DOPPLER COMPLETE: CPT | Performed by: INTERNAL MEDICINE

## 2024-05-21 PROCEDURE — 93306 TTE W/DOPPLER COMPLETE: CPT

## 2024-05-21 NOTE — DISCHARGE INSTR - COC
INTERVENTION:[id number]}  Weight Bearing Status/Restrictions: { CC Weight Bearin}  Other Medical Equipment (for information only, NOT a DME order):  {EQUIPMENT:908349484}  Other Treatments: ***    Patient's personal belongings (please select all that are sent with patient):  {CHP DME Belongings:308105392}    RN SIGNATURE:  {Esignature:118509203}    CASE MANAGEMENT/SOCIAL WORK SECTION    Inpatient Status Date: ***    Readmission Risk Assessment Score:  Readmission Risk              Risk of Unplanned Readmission:  0           Discharging to Facility/ Agency   Name:   Address:  Phone:  Fax:    Dialysis Facility (if applicable)   Name:  Address:  Dialysis Schedule:  Phone:  Fax:    / signature: {Esignature:067513017}    PHYSICIAN SECTION    Prognosis: {Prognosis:4588650837}    Condition at Discharge: { Patient Condition:699658048}    Rehab Potential (if transferring to Rehab): {Prognosis:5562836379}    Recommended Labs or Other Treatments After Discharge: ***    Physician Certification: I certify the above information and transfer of Candy Carrasco  is necessary for the continuing treatment of the diagnosis listed and that she requires {Admit to Appropriate Level of Care:09695} for {GREATER/LESS:495108091} 30 days.     Update Admission H&P: {CHP DME Changes in HandP:910192617}    PHYSICIAN SIGNATURE:  {Esignature:969381939}

## 2024-05-31 NOTE — PROGRESS NOTES
root. Mildly dilated ascending aorta. Ao ascending diameter is 4.0 cm.    All above diagnostic testing and laboratory data was independently visualized and reviewed by me (not simply review of report)       Assessment and Plan   1) Chronic systolic heart failure/NICM  -EF 35%, cath showed normal coronaries   -pt appears euvolemic today  -encouraged low sodium diet  -continue Toprol XL 25 mg daily, Entresto 24-26 mg BID, Jardiance 10 mg daily   -ICD placement 9/5/2023  -repeat Echo 5/024 showed improved EF 55-60%    2) Persistent atrial fibrillation  -s/p Afib ablation 4/27/23  -s/p DCCV 3/2023, Afib ablation 4/27/23 Dr Ortiz  -asymptomatic  -treatment options for atrial fibrillation discussed   -continue Xarelto 20 mg daily, previous fall reported  -continue beta-blocker but will discontinue amiodarone     3) Essential hypertension  -controlled  -continue medical therapy    4) Hyperlipidemia  -continue statin with Zocor 20 mg daily     5) TIA   -continue statin   -denies any recurrence     6) Morbid obesity/suspected sleep apnea   -BMI 44  -encouraged weight loss.  -will refer to sleep medicine     7) superficial thrombus  -venous doppler resulted in acute, isolated totally occluded superficial thrombus 10/2022  -resolved    Follow up in 1 year    Thank you very much for allowing me to participate in the care of your patient. Please do not hesitate to contact me if you have any questions.    Augusto Ewing MD [facility]  General, Interventional Cardiology, and Peripheral Vascular Disease   [facility]   Ph: [phone]  Fax: [phone]      This note was scribed in the presence of Dr. Augusto Ewing MD by Cora Osei, ALYSSA    Physician Attestation:  The scribes documentation has been prepared under my direction and personally reviewed by me in its entirety.     I confirm the note above accurately reflects all work, treatment, procedures, and medical decision making performed by me.    Electronically signed by

## 2024-06-03 ENCOUNTER — OFFICE VISIT (OUTPATIENT)
Dept: CARDIOLOGY CLINIC | Age: 74
End: 2024-06-03
Payer: COMMERCIAL

## 2024-06-03 VITALS
WEIGHT: 270 LBS | BODY MASS INDEX: 43.58 KG/M2 | OXYGEN SATURATION: 97 % | HEART RATE: 90 BPM | SYSTOLIC BLOOD PRESSURE: 124 MMHG | DIASTOLIC BLOOD PRESSURE: 78 MMHG

## 2024-06-03 DIAGNOSIS — I50.22 CHRONIC SYSTOLIC HEART FAILURE (HCC): Primary | ICD-10-CM

## 2024-06-03 DIAGNOSIS — I48.11 LONGSTANDING PERSISTENT ATRIAL FIBRILLATION (HCC): ICD-10-CM

## 2024-06-03 PROCEDURE — 3074F SYST BP LT 130 MM HG: CPT | Performed by: INTERNAL MEDICINE

## 2024-06-03 PROCEDURE — 3078F DIAST BP <80 MM HG: CPT | Performed by: INTERNAL MEDICINE

## 2024-06-03 PROCEDURE — 99213 OFFICE O/P EST LOW 20 MIN: CPT | Performed by: INTERNAL MEDICINE

## 2024-06-03 PROCEDURE — 1123F ACP DISCUSS/DSCN MKR DOCD: CPT | Performed by: INTERNAL MEDICINE

## 2024-12-05 NOTE — PROGRESS NOTES
MD Yesica     Social History:   reports that she has never smoked. She has never used smokeless tobacco. She reports that she does not drink alcohol and does not use drugs.      Family History:  family history includes Diabetes in her maternal grandmother, maternal uncle, and mother; High Blood Pressure in her father; High Cholesterol in her father.   Reviewed. Denies family history of sudden cardiac death, arrhythmia, premature CAD    Review of System:  Pertinent positive and negatives are in the HPI, the rest are negative.     Physical Examination:  /72 (Site: Right Lower Arm, Position: Sitting)   Pulse 83   Ht 1.651 m (5' 5\")   Wt 120.7 kg (266 lb)   SpO2 96%   BMI 44.26 kg/m²      General: Calm and not in acute distress.   HEENT: Atraumatic normocephalic. Normal eye movements.   Neck: No JVP.  Cardiac: Irregularly irregular with no appreciable murmurs.   Lungs: Not labored. Clear to ausculation.   Extremities. No pitting edema  Neurology: A&O x3.   Psychiatry: Normal affect and mood.    Labs:  Reviewed.     EC24 reviewed, AF with v-rate of 67 bpm with QRS duration 144 ms. No ventricular pre-excitation, or QT prolongation.     EC2024 reviewed, atrial fibrillation, LBBB with v-rate of 67 bpm with QRS duration 163 ms. No ventricular pre-excitation, or QT prolongation.     Studies:   1. Event monitor: 2023-2023.  Predominate rhythm: Sinus Bradycardia.  Bundle branch block.   Pauses 648 up to 4.3 seconds in duration.   EAR Runs.  PAC 0.9%.  PVC 0.1%.    2. Echo: 24    Left Ventricle: Normal left ventricular systolic function with a visually estimated EF of 55 - 60%. Left ventricle size is normal. Moderately increased wall thickness. Paradoxical septal motion noted. Indeterminate diastolic function.    Right Ventricle: Right ventricle size is normal. Pacer wire visualized. Normal systolic function.    Tricuspid Valve: Mild regurgitation. The estimated RVSP is 32

## 2024-12-06 ENCOUNTER — NURSE ONLY (OUTPATIENT)
Dept: CARDIOLOGY CLINIC | Age: 74
End: 2024-12-06

## 2024-12-06 ENCOUNTER — OFFICE VISIT (OUTPATIENT)
Dept: CARDIOLOGY CLINIC | Age: 74
End: 2024-12-06

## 2024-12-06 VITALS
SYSTOLIC BLOOD PRESSURE: 122 MMHG | HEART RATE: 83 BPM | WEIGHT: 266 LBS | HEIGHT: 65 IN | BODY MASS INDEX: 44.32 KG/M2 | DIASTOLIC BLOOD PRESSURE: 72 MMHG | OXYGEN SATURATION: 96 %

## 2024-12-06 DIAGNOSIS — I48.0 PAF (PAROXYSMAL ATRIAL FIBRILLATION) (HCC): ICD-10-CM

## 2024-12-06 DIAGNOSIS — I49.5 TACHY-BRADY SYNDROME (HCC): ICD-10-CM

## 2024-12-06 DIAGNOSIS — I49.5 SICK SINUS SYNDROME (HCC): ICD-10-CM

## 2024-12-06 DIAGNOSIS — I48.91 ATRIAL FIBRILLATION, RAPID (HCC): Primary | ICD-10-CM

## 2024-12-06 DIAGNOSIS — I42.8 NON-ISCHEMIC CARDIOMYOPATHY (HCC): ICD-10-CM

## 2024-12-06 DIAGNOSIS — Z95.0 CARDIAC PACEMAKER IN SITU: Primary | ICD-10-CM

## 2024-12-06 DIAGNOSIS — I48.11 LONGSTANDING PERSISTENT ATRIAL FIBRILLATION (HCC): ICD-10-CM

## 2025-02-11 DIAGNOSIS — I50.22 CHRONIC SYSTOLIC HEART FAILURE (HCC): Primary | ICD-10-CM

## 2025-02-12 RX ORDER — EMPAGLIFLOZIN 10 MG/1
10 TABLET, FILM COATED ORAL DAILY
Qty: 30 TABLET | Refills: 0 | Status: SHIPPED | OUTPATIENT
Start: 2025-02-12 | End: 2025-03-11 | Stop reason: SDUPTHER

## 2025-02-12 NOTE — TELEPHONE ENCOUNTER
Last OV:  24 - Arseniou   6/3/24 - Gildardo  Next OV: Recall List 2025 - Gildardo  Last Labs: 23  Last EK24   Last Filled: 3/29/24 - #90   1 Tablet QD     3RF    Please send 30 day supply to [facility] on [address] and after blood work wants sent to mail order, [facility]    Patient notified and blood work ordered (BMP)

## 2025-02-27 DIAGNOSIS — I50.22 CHRONIC SYSTOLIC HEART FAILURE (HCC): ICD-10-CM

## 2025-02-27 LAB
ALBUMIN SERPL-MCNC: 3.8 G/DL (ref 3.4–5)
ALBUMIN/GLOB SERPL: 1.4 {RATIO} (ref 1.1–2.2)
ALP SERPL-CCNC: 109 U/L (ref 40–129)
ALT SERPL-CCNC: 18 U/L (ref 10–40)
ANION GAP SERPL CALCULATED.3IONS-SCNC: 10 MMOL/L (ref 3–16)
AST SERPL-CCNC: 21 U/L (ref 15–37)
BILIRUB SERPL-MCNC: 0.9 MG/DL (ref 0–1)
BUN SERPL-MCNC: 19 MG/DL (ref 7–20)
CALCIUM SERPL-MCNC: 9.6 MG/DL (ref 8.3–10.6)
CHLORIDE SERPL-SCNC: 104 MMOL/L (ref 99–110)
CO2 SERPL-SCNC: 25 MMOL/L (ref 21–32)
CREAT SERPL-MCNC: 0.7 MG/DL (ref 0.6–1.2)
GFR SERPLBLD CREATININE-BSD FMLA CKD-EPI: >90 ML/MIN/{1.73_M2}
GLUCOSE SERPL-MCNC: 91 MG/DL (ref 70–99)
POTASSIUM SERPL-SCNC: 4.3 MMOL/L (ref 3.5–5.1)
PROT SERPL-MCNC: 6.6 G/DL (ref 6.4–8.2)
SODIUM SERPL-SCNC: 139 MMOL/L (ref 136–145)

## 2025-03-11 NOTE — TELEPHONE ENCOUNTER
Medication Refill    When was your last appointment with cardiology?    (If 1 yr or longer, please schedule appointment)    (If patient has been told they do not need to follow-up - medications should be filled by PCP)  06/03/24    When did you last have labs drawn?     Medication needing refilled?  JARDIANCE     Dosage of the medication?  10 MG tablet     How are you taking this medication (QD, BID, TID, QID, PRN)?  TAKE 1 TABLET BY MOUTH DAILY     Do you want a 30 or 90 day supply?  30  When will you run out of your medication?     Which Pharmacy are we sending this medication to?  [id number] [place] [id number]   Pharmacy Phone: [phone]   Pharmacy Fax: [phone]

## 2025-08-06 ENCOUNTER — TELEPHONE (OUTPATIENT)
Dept: CARDIOLOGY CLINIC | Age: 75
End: 2025-08-06

## 2025-08-15 ENCOUNTER — OFFICE VISIT (OUTPATIENT)
Dept: CARDIOLOGY CLINIC | Age: 75
End: 2025-08-15
Payer: MEDICARE

## 2025-08-15 VITALS
SYSTOLIC BLOOD PRESSURE: 130 MMHG | BODY MASS INDEX: 42.99 KG/M2 | WEIGHT: 258 LBS | HEIGHT: 65 IN | DIASTOLIC BLOOD PRESSURE: 74 MMHG | OXYGEN SATURATION: 96 % | HEART RATE: 72 BPM

## 2025-08-15 DIAGNOSIS — I50.22 CHRONIC SYSTOLIC HEART FAILURE (HCC): Primary | ICD-10-CM

## 2025-08-15 DIAGNOSIS — R06.02 SHORTNESS OF BREATH: ICD-10-CM

## 2025-08-15 PROCEDURE — 3017F COLORECTAL CA SCREEN DOC REV: CPT | Performed by: INTERNAL MEDICINE

## 2025-08-15 PROCEDURE — G8427 DOCREV CUR MEDS BY ELIG CLIN: HCPCS | Performed by: INTERNAL MEDICINE

## 2025-08-15 PROCEDURE — 3078F DIAST BP <80 MM HG: CPT | Performed by: INTERNAL MEDICINE

## 2025-08-15 PROCEDURE — 93000 ELECTROCARDIOGRAM COMPLETE: CPT | Performed by: INTERNAL MEDICINE

## 2025-08-15 PROCEDURE — 1036F TOBACCO NON-USER: CPT | Performed by: INTERNAL MEDICINE

## 2025-08-15 PROCEDURE — 1123F ACP DISCUSS/DSCN MKR DOCD: CPT | Performed by: INTERNAL MEDICINE

## 2025-08-15 PROCEDURE — 1159F MED LIST DOCD IN RCRD: CPT | Performed by: INTERNAL MEDICINE

## 2025-08-15 PROCEDURE — 99214 OFFICE O/P EST MOD 30 MIN: CPT | Performed by: INTERNAL MEDICINE

## 2025-08-15 PROCEDURE — G8417 CALC BMI ABV UP PARAM F/U: HCPCS | Performed by: INTERNAL MEDICINE

## 2025-08-15 PROCEDURE — G8400 PT W/DXA NO RESULTS DOC: HCPCS | Performed by: INTERNAL MEDICINE

## 2025-08-15 PROCEDURE — 3075F SYST BP GE 130 - 139MM HG: CPT | Performed by: INTERNAL MEDICINE

## 2025-08-15 PROCEDURE — 1090F PRES/ABSN URINE INCON ASSESS: CPT | Performed by: INTERNAL MEDICINE

## 2025-08-15 RX ORDER — ALENDRONATE SODIUM 70 MG/1
70 TABLET ORAL
COMMUNITY
Start: 2025-05-27